# Patient Record
Sex: FEMALE | Race: WHITE | NOT HISPANIC OR LATINO | Employment: OTHER | URBAN - METROPOLITAN AREA
[De-identification: names, ages, dates, MRNs, and addresses within clinical notes are randomized per-mention and may not be internally consistent; named-entity substitution may affect disease eponyms.]

---

## 2017-02-20 RX ORDER — MONTELUKAST SODIUM 10 MG/1
TABLET ORAL
Qty: 90 TABLET | Refills: 0 | Status: SHIPPED | OUTPATIENT
Start: 2017-02-20 | End: 2017-05-20 | Stop reason: SDUPTHER

## 2017-02-20 RX ORDER — SIMVASTATIN 20 MG
TABLET ORAL
Qty: 90 TABLET | Refills: 0 | Status: SHIPPED | OUTPATIENT
Start: 2017-02-20 | End: 2017-05-20 | Stop reason: SDUPTHER

## 2017-03-03 DIAGNOSIS — M81.0 OP (OSTEOPOROSIS): ICD-10-CM

## 2017-03-03 DIAGNOSIS — E03.9 ADULT HYPOTHYROIDISM: ICD-10-CM

## 2017-03-03 DIAGNOSIS — E78.5 HYPERLIPIDEMIA, UNSPECIFIED HYPERLIPIDEMIA TYPE: Primary | ICD-10-CM

## 2017-03-03 LAB
25(OH)D3+25(OH)D2 SERPL-MCNC: 49.5 NG/ML (ref 30–100)
ALBUMIN SERPL-MCNC: 4.6 G/DL (ref 3.5–5.2)
ALBUMIN/GLOB SERPL: 1.8 G/DL
ALP SERPL-CCNC: 53 U/L (ref 39–117)
ALT SERPL-CCNC: 20 U/L (ref 1–33)
AST SERPL-CCNC: 25 U/L (ref 1–32)
BASOPHILS # BLD AUTO: 0.02 10*3/MM3 (ref 0–0.2)
BASOPHILS NFR BLD AUTO: 0.4 % (ref 0–1.5)
BILIRUB SERPL-MCNC: 0.4 MG/DL (ref 0.1–1.2)
BUN SERPL-MCNC: 11 MG/DL (ref 8–23)
BUN/CREAT SERPL: 14.7 (ref 7–25)
CALCIUM SERPL-MCNC: 9.4 MG/DL (ref 8.6–10.5)
CHLORIDE SERPL-SCNC: 102 MMOL/L (ref 98–107)
CHOLEST SERPL-MCNC: 127 MG/DL (ref 0–200)
CO2 SERPL-SCNC: 29.4 MMOL/L (ref 22–29)
CREAT SERPL-MCNC: 0.75 MG/DL (ref 0.57–1)
EOSINOPHIL # BLD AUTO: 0.07 10*3/MM3 (ref 0–0.7)
EOSINOPHIL NFR BLD AUTO: 1.3 % (ref 0.3–6.2)
ERYTHROCYTE [DISTWIDTH] IN BLOOD BY AUTOMATED COUNT: 13.9 % (ref 11.7–13)
GLOBULIN SER CALC-MCNC: 2.5 GM/DL
GLUCOSE SERPL-MCNC: 90 MG/DL (ref 65–99)
HCT VFR BLD AUTO: 42.2 % (ref 35.6–45.5)
HDLC SERPL-MCNC: 58 MG/DL (ref 40–60)
HGB BLD-MCNC: 13.5 G/DL (ref 11.9–15.5)
IMM GRANULOCYTES # BLD: 0.02 10*3/MM3 (ref 0–0.03)
IMM GRANULOCYTES NFR BLD: 0.4 % (ref 0–0.5)
LDLC SERPL CALC-MCNC: 61 MG/DL (ref 0–100)
LYMPHOCYTES # BLD AUTO: 0.98 10*3/MM3 (ref 0.9–4.8)
LYMPHOCYTES NFR BLD AUTO: 17.7 % (ref 19.6–45.3)
MCH RBC QN AUTO: 29.7 PG (ref 26.9–32)
MCHC RBC AUTO-ENTMCNC: 32 G/DL (ref 32.4–36.3)
MCV RBC AUTO: 93 FL (ref 80.5–98.2)
MONOCYTES # BLD AUTO: 0.88 10*3/MM3 (ref 0.2–1.2)
MONOCYTES NFR BLD AUTO: 15.9 % (ref 5–12)
NEUTROPHILS # BLD AUTO: 3.58 10*3/MM3 (ref 1.9–8.1)
NEUTROPHILS NFR BLD AUTO: 64.3 % (ref 42.7–76)
PLATELET # BLD AUTO: 155 10*3/MM3 (ref 140–500)
POTASSIUM SERPL-SCNC: 4.1 MMOL/L (ref 3.5–5.2)
PROT SERPL-MCNC: 7.1 G/DL (ref 6–8.5)
RBC # BLD AUTO: 4.54 10*6/MM3 (ref 3.9–5.2)
SODIUM SERPL-SCNC: 144 MMOL/L (ref 136–145)
TRIGL SERPL-MCNC: 41 MG/DL (ref 0–150)
TSH SERPL DL<=0.005 MIU/L-ACNC: 0.3 MIU/ML (ref 0.27–4.2)
VLDLC SERPL CALC-MCNC: 8.2 MG/DL (ref 5–40)
WBC # BLD AUTO: 5.55 10*3/MM3 (ref 4.5–10.7)

## 2017-03-14 ENCOUNTER — OFFICE VISIT (OUTPATIENT)
Dept: INTERNAL MEDICINE | Facility: CLINIC | Age: 72
End: 2017-03-14

## 2017-03-14 VITALS
OXYGEN SATURATION: 97 % | DIASTOLIC BLOOD PRESSURE: 88 MMHG | HEART RATE: 68 BPM | WEIGHT: 121 LBS | SYSTOLIC BLOOD PRESSURE: 122 MMHG | BODY MASS INDEX: 21.44 KG/M2 | HEIGHT: 63 IN

## 2017-03-14 DIAGNOSIS — E03.9 ADULT HYPOTHYROIDISM: ICD-10-CM

## 2017-03-14 DIAGNOSIS — H40.9 GLAUCOMA OF BOTH EYES, UNSPECIFIED GLAUCOMA: ICD-10-CM

## 2017-03-14 DIAGNOSIS — Z11.59 NEED FOR HEPATITIS C SCREENING TEST: ICD-10-CM

## 2017-03-14 DIAGNOSIS — E78.5 HYPERLIPIDEMIA, UNSPECIFIED HYPERLIPIDEMIA TYPE: ICD-10-CM

## 2017-03-14 DIAGNOSIS — M81.0 OP (OSTEOPOROSIS): ICD-10-CM

## 2017-03-14 DIAGNOSIS — Z00.00 WELL ADULT EXAM: ICD-10-CM

## 2017-03-14 DIAGNOSIS — Z00.00 MEDICARE ANNUAL WELLNESS VISIT, SUBSEQUENT: Primary | ICD-10-CM

## 2017-03-14 PROCEDURE — 93000 ELECTROCARDIOGRAM COMPLETE: CPT | Performed by: INTERNAL MEDICINE

## 2017-03-14 PROCEDURE — G0009 ADMIN PNEUMOCOCCAL VACCINE: HCPCS | Performed by: INTERNAL MEDICINE

## 2017-03-14 PROCEDURE — 90732 PPSV23 VACC 2 YRS+ SUBQ/IM: CPT | Performed by: INTERNAL MEDICINE

## 2017-03-14 PROCEDURE — G0439 PPPS, SUBSEQ VISIT: HCPCS | Performed by: INTERNAL MEDICINE

## 2017-03-14 PROCEDURE — 99397 PER PM REEVAL EST PAT 65+ YR: CPT | Performed by: INTERNAL MEDICINE

## 2017-03-14 RX ORDER — LATANOPROST 50 UG/ML
1 SOLUTION/ DROPS OPHTHALMIC
COMMUNITY
Start: 2017-02-18 | End: 2020-01-14

## 2017-03-14 NOTE — PATIENT INSTRUCTIONS
Medicare Wellness  Personal Prevention Plan of Service     Date of Office Visit:  2017  Encounter Provider:  Chanel Duncan MD  Place of Service:  [facility]  Patient Name: Jasmine Banegas  :  1945    As part of the Medicare Wellness portion of your visit today, we are providing you with this personalized preventive plan of services (PPPS). This plan is based upon recommendations of the [place] Preventive Services Task Force (USPSTF) and the Advisory Committee on Immunization Practices (ACIP).    This lists the preventive care services that should be considered, and provides dates of when you are due. Items listed as completed are up-to-date and do not require any further intervention.    Health Maintenance   Topic Date Due   • HEPATITIS C SCREENING  2016   • MEDICARE ANNUAL WELLNESS  2016   • ZOSTER VACCINE  2016   • INFLUENZA VACCINE  2016   • PNEUMOCOCCAL VACCINES (65+ LOW/MEDIUM RISK) (2 of 2 - PPSV23) 2017   • DXA SCAN  2018   • LIPID PANEL  2018   • MAMMOGRAM  2018   • COLONOSCOPY  10/31/2018   • TDAP/TD VACCINES (2 - Td) 10/01/2022

## 2017-03-15 LAB — HCV AB S/CO SERPL IA: <0.1 S/CO RATIO (ref 0–0.9)

## 2017-03-21 PROBLEM — M81.0 SENILE OSTEOPOROSIS: Status: ACTIVE | Noted: 2017-03-21

## 2017-03-22 ENCOUNTER — HOSPITAL ENCOUNTER (OUTPATIENT)
Dept: INFUSION THERAPY | Facility: HOSPITAL | Age: 72
Discharge: HOME | End: 2017-03-22

## 2017-03-24 RX ORDER — LEVOTHYROXINE SODIUM 0.07 MG/1
TABLET ORAL
Qty: 90 TABLET | Refills: 0 | Status: SHIPPED | OUTPATIENT
Start: 2017-03-24 | End: 2017-06-22 | Stop reason: SDUPTHER

## 2017-03-28 DIAGNOSIS — M81.0 OSTEOPOROSIS: Primary | ICD-10-CM

## 2017-03-30 ENCOUNTER — TRANSCRIBE ORDERS (OUTPATIENT)
Dept: INFUSION THERAPY | Facility: HOSPITAL | Age: 72
End: 2017-03-30

## 2017-03-30 ENCOUNTER — HOSPITAL ENCOUNTER (OUTPATIENT)
Dept: INFUSION THERAPY | Facility: HOSPITAL | Age: 72
Discharge: HOME | End: 2017-03-30
Admitting: INTERNAL MEDICINE

## 2017-03-30 VITALS
DIASTOLIC BLOOD PRESSURE: 71 MMHG | SYSTOLIC BLOOD PRESSURE: 130 MMHG | RESPIRATION RATE: 20 BRPM | TEMPERATURE: 98.1 F | OXYGEN SATURATION: 95 % | HEART RATE: 75 BPM

## 2017-03-30 DIAGNOSIS — M81.0 OSTEOPOROSIS: Primary | ICD-10-CM

## 2017-03-30 DIAGNOSIS — N18.4 CKD (CHRONIC KIDNEY DISEASE), STAGE IV (HCC): ICD-10-CM

## 2017-03-30 DIAGNOSIS — N18.4 ANEMIA ASSOCIATED WITH CHRONIC RENAL FAILURE, STAGE 4 (SEVERE): ICD-10-CM

## 2017-03-30 DIAGNOSIS — D63.1 ANEMIA ASSOCIATED WITH CHRONIC RENAL FAILURE, STAGE 4 (SEVERE): ICD-10-CM

## 2017-03-30 DIAGNOSIS — M81.0 SENILE OSTEOPOROSIS: ICD-10-CM

## 2017-03-30 PROCEDURE — 96401 CHEMO ANTI-NEOPL SQ/IM: CPT

## 2017-03-30 PROCEDURE — 25010000002 DENOSUMAB 60 MG/ML SOLUTION: Performed by: INTERNAL MEDICINE

## 2017-03-30 RX ADMIN — DENOSUMAB 60 MG: 60 INJECTION SUBCUTANEOUS at 15:32

## 2017-04-10 DIAGNOSIS — H40.1111 PRIMARY OPEN ANGLE GLAUCOMA OF RIGHT EYE, MILD STAGE: Primary | ICD-10-CM

## 2017-05-22 RX ORDER — SIMVASTATIN 20 MG
TABLET ORAL
Qty: 90 TABLET | Refills: 0 | Status: SHIPPED | OUTPATIENT
Start: 2017-05-22 | End: 2017-08-17 | Stop reason: SDUPTHER

## 2017-05-22 RX ORDER — MONTELUKAST SODIUM 10 MG/1
TABLET ORAL
Qty: 90 TABLET | Refills: 0 | Status: SHIPPED | OUTPATIENT
Start: 2017-05-22 | End: 2017-08-17 | Stop reason: SDUPTHER

## 2017-06-22 RX ORDER — LEVOTHYROXINE SODIUM 0.07 MG/1
TABLET ORAL
Qty: 90 TABLET | Refills: 0 | Status: SHIPPED | OUTPATIENT
Start: 2017-06-22 | End: 2017-09-25 | Stop reason: SDUPTHER

## 2017-08-17 RX ORDER — MONTELUKAST SODIUM 10 MG/1
TABLET ORAL
Qty: 90 TABLET | Refills: 0 | Status: SHIPPED | OUTPATIENT
Start: 2017-08-17 | End: 2017-11-18 | Stop reason: SDUPTHER

## 2017-08-17 RX ORDER — SIMVASTATIN 20 MG
TABLET ORAL
Qty: 90 TABLET | Refills: 0 | Status: SHIPPED | OUTPATIENT
Start: 2017-08-17 | End: 2017-11-18 | Stop reason: SDUPTHER

## 2017-09-15 ENCOUNTER — LAB (OUTPATIENT)
Dept: INTERNAL MEDICINE | Facility: CLINIC | Age: 72
End: 2017-09-15

## 2017-09-15 DIAGNOSIS — E78.5 HYPERLIPIDEMIA, UNSPECIFIED HYPERLIPIDEMIA TYPE: Primary | ICD-10-CM

## 2017-09-15 DIAGNOSIS — Z00.00 HEALTHCARE MAINTENANCE: ICD-10-CM

## 2017-09-15 DIAGNOSIS — E03.9 ADULT HYPOTHYROIDISM: ICD-10-CM

## 2017-09-16 LAB
ALBUMIN SERPL-MCNC: 4.4 G/DL (ref 3.5–5.2)
ALBUMIN/GLOB SERPL: 1.6 G/DL
ALP SERPL-CCNC: 45 U/L (ref 39–117)
ALT SERPL-CCNC: 18 U/L (ref 1–33)
APPEARANCE UR: CLEAR
AST SERPL-CCNC: 20 U/L (ref 1–32)
BACTERIA #/AREA URNS HPF: NORMAL /HPF
BASOPHILS # BLD AUTO: 0.04 10*3/MM3 (ref 0–0.2)
BASOPHILS NFR BLD AUTO: 0.7 % (ref 0–1.5)
BILIRUB SERPL-MCNC: 0.7 MG/DL (ref 0.1–1.2)
BILIRUB UR QL STRIP: NEGATIVE
BUN SERPL-MCNC: 14 MG/DL (ref 8–23)
BUN/CREAT SERPL: 19.4 (ref 7–25)
CALCIUM SERPL-MCNC: 9.7 MG/DL (ref 8.6–10.5)
CHLORIDE SERPL-SCNC: 100 MMOL/L (ref 98–107)
CHOLEST SERPL-MCNC: 135 MG/DL (ref 0–200)
CO2 SERPL-SCNC: 28.5 MMOL/L (ref 22–29)
COLOR UR: YELLOW
CREAT SERPL-MCNC: 0.72 MG/DL (ref 0.57–1)
EOSINOPHIL # BLD AUTO: 0.11 10*3/MM3 (ref 0–0.7)
EOSINOPHIL NFR BLD AUTO: 2 % (ref 0.3–6.2)
EPI CELLS #/AREA URNS HPF: NORMAL /HPF
ERYTHROCYTE [DISTWIDTH] IN BLOOD BY AUTOMATED COUNT: 14.3 % (ref 11.7–13)
GLOBULIN SER CALC-MCNC: 2.7 GM/DL
GLUCOSE SERPL-MCNC: 82 MG/DL (ref 65–99)
GLUCOSE UR QL: NEGATIVE
HCT VFR BLD AUTO: 41.1 % (ref 35.6–45.5)
HDLC SERPL-MCNC: 56 MG/DL (ref 40–60)
HGB BLD-MCNC: 13.5 G/DL (ref 11.9–15.5)
HGB UR QL STRIP: NEGATIVE
IMM GRANULOCYTES # BLD: 0.02 10*3/MM3 (ref 0–0.03)
IMM GRANULOCYTES NFR BLD: 0.4 % (ref 0–0.5)
KETONES UR QL STRIP: NEGATIVE
LDLC SERPL CALC-MCNC: 69 MG/DL (ref 0–100)
LEUKOCYTE ESTERASE UR QL STRIP: NEGATIVE
LYMPHOCYTES # BLD AUTO: 1.5 10*3/MM3 (ref 0.9–4.8)
LYMPHOCYTES NFR BLD AUTO: 27.3 % (ref 19.6–45.3)
MCH RBC QN AUTO: 29.9 PG (ref 26.9–32)
MCHC RBC AUTO-ENTMCNC: 32.8 G/DL (ref 32.4–36.3)
MCV RBC AUTO: 90.9 FL (ref 80.5–98.2)
MICRO URNS: NORMAL
MICRO URNS: NORMAL
MONOCYTES # BLD AUTO: 0.45 10*3/MM3 (ref 0.2–1.2)
MONOCYTES NFR BLD AUTO: 8.2 % (ref 5–12)
NEUTROPHILS # BLD AUTO: 3.37 10*3/MM3 (ref 1.9–8.1)
NEUTROPHILS NFR BLD AUTO: 61.4 % (ref 42.7–76)
NITRITE UR QL STRIP: NEGATIVE
PH UR STRIP: 7 [PH] (ref 5–7.5)
PLATELET # BLD AUTO: 165 10*3/MM3 (ref 140–500)
POTASSIUM SERPL-SCNC: 4.2 MMOL/L (ref 3.5–5.2)
PROT SERPL-MCNC: 7.1 G/DL (ref 6–8.5)
PROT UR QL STRIP: NEGATIVE
RBC # BLD AUTO: 4.52 10*6/MM3 (ref 3.9–5.2)
RBC #/AREA URNS HPF: NORMAL /HPF
SODIUM SERPL-SCNC: 142 MMOL/L (ref 136–145)
SP GR UR: 1.01 (ref 1–1.03)
T4 FREE SERPL-MCNC: 1.82 NG/DL (ref 0.93–1.7)
TRIGL SERPL-MCNC: 50 MG/DL (ref 0–150)
TSH SERPL DL<=0.005 MIU/L-ACNC: 0.15 MIU/ML (ref 0.27–4.2)
URINALYSIS REFLEX: NORMAL
UROBILINOGEN UR STRIP-MCNC: 0.2 MG/DL (ref 0.2–1)
VLDLC SERPL CALC-MCNC: 10 MG/DL (ref 5–40)
WBC # BLD AUTO: 5.49 10*3/MM3 (ref 4.5–10.7)
WBC #/AREA URNS HPF: NORMAL /HPF

## 2017-09-25 RX ORDER — LEVOTHYROXINE SODIUM 0.07 MG/1
TABLET ORAL
Qty: 90 TABLET | Refills: 0 | Status: SHIPPED | OUTPATIENT
Start: 2017-09-25 | End: 2017-12-27 | Stop reason: SDUPTHER

## 2017-09-28 ENCOUNTER — HOSPITAL ENCOUNTER (OUTPATIENT)
Dept: INFUSION THERAPY | Facility: HOSPITAL | Age: 72
Discharge: HOME | End: 2017-09-28
Admitting: INTERNAL MEDICINE

## 2017-09-28 VITALS
TEMPERATURE: 97.8 F | OXYGEN SATURATION: 98 % | DIASTOLIC BLOOD PRESSURE: 68 MMHG | BODY MASS INDEX: 21.09 KG/M2 | HEART RATE: 51 BPM | RESPIRATION RATE: 20 BRPM | WEIGHT: 119 LBS | SYSTOLIC BLOOD PRESSURE: 133 MMHG | HEIGHT: 63 IN

## 2017-09-28 DIAGNOSIS — M81.0 SENILE OSTEOPOROSIS: ICD-10-CM

## 2017-09-28 PROCEDURE — 25010000002 DENOSUMAB 60 MG/ML SOLUTION: Performed by: INTERNAL MEDICINE

## 2017-09-28 PROCEDURE — 96372 THER/PROPH/DIAG INJ SC/IM: CPT

## 2017-09-28 RX ADMIN — DENOSUMAB 60 MG: 60 INJECTION SUBCUTANEOUS at 15:05

## 2017-11-20 RX ORDER — SIMVASTATIN 20 MG
TABLET ORAL
Qty: 90 TABLET | Refills: 0 | Status: SHIPPED | OUTPATIENT
Start: 2017-11-20 | End: 2018-02-22 | Stop reason: SDUPTHER

## 2017-11-20 RX ORDER — MONTELUKAST SODIUM 10 MG/1
TABLET ORAL
Qty: 90 TABLET | Refills: 0 | Status: SHIPPED | OUTPATIENT
Start: 2017-11-20 | End: 2018-02-22 | Stop reason: SDUPTHER

## 2017-12-27 RX ORDER — LEVOTHYROXINE SODIUM 0.07 MG/1
TABLET ORAL
Qty: 90 TABLET | Refills: 0 | Status: SHIPPED | OUTPATIENT
Start: 2017-12-27 | End: 2018-03-19 | Stop reason: SDUPTHER

## 2018-02-22 RX ORDER — MONTELUKAST SODIUM 10 MG/1
TABLET ORAL
Qty: 90 TABLET | Refills: 0 | Status: SHIPPED | OUTPATIENT
Start: 2018-02-22 | End: 2018-04-02 | Stop reason: SDUPTHER

## 2018-02-22 RX ORDER — SIMVASTATIN 20 MG
TABLET ORAL
Qty: 90 TABLET | Refills: 0 | Status: SHIPPED | OUTPATIENT
Start: 2018-02-22 | End: 2018-04-02 | Stop reason: SDUPTHER

## 2018-03-12 DIAGNOSIS — E78.5 HYPERLIPIDEMIA, UNSPECIFIED HYPERLIPIDEMIA TYPE: ICD-10-CM

## 2018-03-12 DIAGNOSIS — E03.9 ADULT HYPOTHYROIDISM: ICD-10-CM

## 2018-03-12 DIAGNOSIS — M81.0 OSTEOPOROSIS, UNSPECIFIED OSTEOPOROSIS TYPE, UNSPECIFIED PATHOLOGICAL FRACTURE PRESENCE: ICD-10-CM

## 2018-03-12 LAB
25(OH)D3+25(OH)D2 SERPL-MCNC: 82.5 NG/ML (ref 30–100)
ALBUMIN SERPL-MCNC: 4.4 G/DL (ref 3.5–5.2)
ALBUMIN/GLOB SERPL: 1.8 G/DL
ALP SERPL-CCNC: 43 U/L (ref 39–117)
ALT SERPL-CCNC: 19 U/L (ref 1–33)
APPEARANCE UR: CLEAR
AST SERPL-CCNC: 22 U/L (ref 1–32)
BACTERIA #/AREA URNS HPF: NORMAL /HPF
BASOPHILS # BLD AUTO: 0.03 10*3/MM3 (ref 0–0.2)
BASOPHILS NFR BLD AUTO: 0.5 % (ref 0–1.5)
BILIRUB SERPL-MCNC: 0.5 MG/DL (ref 0.1–1.2)
BILIRUB UR QL STRIP: NEGATIVE
BUN SERPL-MCNC: 10 MG/DL (ref 8–23)
BUN/CREAT SERPL: 13.9 (ref 7–25)
CALCIUM SERPL-MCNC: 9.4 MG/DL (ref 8.6–10.5)
CASTS URNS MICRO: NORMAL
CHLORIDE SERPL-SCNC: 100 MMOL/L (ref 98–107)
CHOLEST SERPL-MCNC: 142 MG/DL (ref 0–200)
CO2 SERPL-SCNC: 30.3 MMOL/L (ref 22–29)
COLOR UR: YELLOW
CREAT SERPL-MCNC: 0.72 MG/DL (ref 0.57–1)
EOSINOPHIL # BLD AUTO: 0.09 10*3/MM3 (ref 0–0.7)
EOSINOPHIL NFR BLD AUTO: 1.5 % (ref 0.3–6.2)
EPI CELLS #/AREA URNS HPF: NORMAL /HPF
ERYTHROCYTE [DISTWIDTH] IN BLOOD BY AUTOMATED COUNT: 14 % (ref 11.7–13)
GFR SERPLBLD CREATININE-BSD FMLA CKD-EPI: 80 ML/MIN/1.73
GFR SERPLBLD CREATININE-BSD FMLA CKD-EPI: 97 ML/MIN/1.73
GLOBULIN SER CALC-MCNC: 2.4 GM/DL
GLUCOSE SERPL-MCNC: 94 MG/DL (ref 65–99)
GLUCOSE UR QL: NEGATIVE
HCT VFR BLD AUTO: 40.6 % (ref 35.6–45.5)
HDLC SERPL-MCNC: 58 MG/DL (ref 40–60)
HGB BLD-MCNC: 12.9 G/DL (ref 11.9–15.5)
HGB UR QL STRIP: NEGATIVE
IMM GRANULOCYTES # BLD: 0 10*3/MM3 (ref 0–0.03)
IMM GRANULOCYTES NFR BLD: 0 % (ref 0–0.5)
KETONES UR QL STRIP: NEGATIVE
LDLC SERPL CALC-MCNC: 76 MG/DL (ref 0–100)
LEUKOCYTE ESTERASE UR QL STRIP: (no result)
LYMPHOCYTES # BLD AUTO: 1.87 10*3/MM3 (ref 0.9–4.8)
LYMPHOCYTES NFR BLD AUTO: 30.2 % (ref 19.6–45.3)
MCH RBC QN AUTO: 29.5 PG (ref 26.9–32)
MCHC RBC AUTO-ENTMCNC: 31.8 G/DL (ref 32.4–36.3)
MCV RBC AUTO: 92.7 FL (ref 80.5–98.2)
MONOCYTES # BLD AUTO: 0.49 10*3/MM3 (ref 0.2–1.2)
MONOCYTES NFR BLD AUTO: 7.9 % (ref 5–12)
NEUTROPHILS # BLD AUTO: 3.71 10*3/MM3 (ref 1.9–8.1)
NEUTROPHILS NFR BLD AUTO: 59.9 % (ref 42.7–76)
NITRITE UR QL STRIP: NEGATIVE
PH UR STRIP: 7.5 [PH] (ref 5–8)
PLATELET # BLD AUTO: 165 10*3/MM3 (ref 140–500)
POTASSIUM SERPL-SCNC: 4.4 MMOL/L (ref 3.5–5.2)
PROT SERPL-MCNC: 6.8 G/DL (ref 6–8.5)
PROT UR QL STRIP: NEGATIVE
RBC # BLD AUTO: 4.38 10*6/MM3 (ref 3.9–5.2)
RBC #/AREA URNS HPF: NORMAL /HPF
SODIUM SERPL-SCNC: 141 MMOL/L (ref 136–145)
SP GR UR: 1.01 (ref 1–1.03)
TRIGL SERPL-MCNC: 42 MG/DL (ref 0–150)
TSH SERPL DL<=0.005 MIU/L-ACNC: 0.58 MIU/ML (ref 0.27–4.2)
UROBILINOGEN UR STRIP-MCNC: (no result) MG/DL
VLDLC SERPL CALC-MCNC: 8.4 MG/DL (ref 5–40)
WBC # BLD AUTO: 6.19 10*3/MM3 (ref 4.5–10.7)
WBC #/AREA URNS HPF: NORMAL /HPF

## 2018-03-19 ENCOUNTER — OFFICE VISIT (OUTPATIENT)
Dept: INTERNAL MEDICINE | Facility: CLINIC | Age: 73
End: 2018-03-19

## 2018-03-19 VITALS
BODY MASS INDEX: 21.09 KG/M2 | WEIGHT: 119 LBS | OXYGEN SATURATION: 99 % | DIASTOLIC BLOOD PRESSURE: 92 MMHG | HEIGHT: 63 IN | HEART RATE: 69 BPM | SYSTOLIC BLOOD PRESSURE: 142 MMHG

## 2018-03-19 DIAGNOSIS — Z12.4 ENCOUNTER FOR SCREENING FOR CERVICAL CANCER: ICD-10-CM

## 2018-03-19 DIAGNOSIS — Z12.31 ENCOUNTER FOR SCREENING MAMMOGRAM FOR BREAST CANCER: ICD-10-CM

## 2018-03-19 DIAGNOSIS — E03.9 ADULT HYPOTHYROIDISM: ICD-10-CM

## 2018-03-19 DIAGNOSIS — E78.5 HYPERLIPIDEMIA, UNSPECIFIED HYPERLIPIDEMIA TYPE: ICD-10-CM

## 2018-03-19 DIAGNOSIS — Z00.00 MEDICARE ANNUAL WELLNESS VISIT, SUBSEQUENT: Primary | ICD-10-CM

## 2018-03-19 DIAGNOSIS — H40.9 GLAUCOMA OF BOTH EYES, UNSPECIFIED GLAUCOMA TYPE: ICD-10-CM

## 2018-03-19 DIAGNOSIS — M81.0 SENILE OSTEOPOROSIS: ICD-10-CM

## 2018-03-19 DIAGNOSIS — Z00.00 WELL ADULT EXAM: ICD-10-CM

## 2018-03-19 PROBLEM — M17.9 OSTEOARTHRITIS OF KNEE: Status: ACTIVE | Noted: 2018-03-19

## 2018-03-19 PROCEDURE — 96160 PT-FOCUSED HLTH RISK ASSMT: CPT | Performed by: INTERNAL MEDICINE

## 2018-03-19 PROCEDURE — 99397 PER PM REEVAL EST PAT 65+ YR: CPT | Performed by: INTERNAL MEDICINE

## 2018-03-19 PROCEDURE — G0439 PPPS, SUBSEQ VISIT: HCPCS | Performed by: INTERNAL MEDICINE

## 2018-03-19 RX ORDER — LISINOPRIL 10 MG/1
10 TABLET ORAL DAILY
Qty: 30 TABLET | Refills: 11 | Status: SHIPPED | OUTPATIENT
Start: 2018-03-19 | End: 2018-04-02 | Stop reason: SDUPTHER

## 2018-03-19 RX ORDER — BRIMONIDINE TARTRATE 2 MG/ML
1 SOLUTION/ DROPS OPHTHALMIC 2 TIMES DAILY
COMMUNITY
Start: 2017-12-27 | End: 2021-03-10

## 2018-03-19 NOTE — PROGRESS NOTES
Subjective     Jasmine Banegas is a 72 y.o. female who presents for an annual wellness visit/cpe as well as check up of hypothyroidism, hyperlipdemia.  New HTN.      History of Present Illness     Hypothyroidism.  Good control.  HLD.  Good control with simvastatin.  HTN.  BP is running in the 150s-160s at home.   OP.  She is maintained on Prolia, calcium and D.  She is due for DEXA.      Review of Systems   Constitutional: Negative.    HENT: Negative.    Eyes: Negative.    Respiratory: Negative.    Cardiovascular: Negative.    Gastrointestinal: Negative.    Endocrine: Negative.    Genitourinary: Negative.    Musculoskeletal: Negative.    Skin: Negative.    Allergic/Immunologic: Negative.    Neurological: Negative.    Hematological: Negative.    Psychiatric/Behavioral: Negative.        The following portions of the patient's history were reviewed and updated as appropriate: allergies, current medications, past family history, past medical history, past social history, past surgical history and problem list.  Health maintenance tab was reviewed and updated with the patient.       Patient Active Problem List    Diagnosis Date Noted   • Osteoarthritis of knee 03/19/2018   • Senile osteoporosis 03/21/2017   • Glaucoma of both eyes 03/14/2017   • Overactive bladder 03/07/2016   • HLD (hyperlipidemia) 02/25/2016     Note Last Updated: 2/25/2016     Description: Statins started in 2/2014 secondary to 7.6% 10 year ASCVD risk.     • Adult hypothyroidism 02/25/2016   • OP (osteoporosis) 02/25/2016     Note Last Updated: 2/25/2016     Description: Fosamax 3170-5101  Prolia started in 2014. Recommend 1200mg calcium and 1000IUs D in diet/supplements.     • Essential hypertension 02/25/2016     Note Last Updated: 2/25/2016     Impression: 02/12/2014 - Blood pressure is well-controlled when not in the office.  I do encourage to check regularly.;          Past Medical History:   Diagnosis Date   • Graves disease    • Graves disease  "   • Graves disease    • Hyperlipidemia    • Hypertension    • Lower back pain    • Osteoporosis    • Overactive bladder    • RA (rheumatoid arthritis)        Past Surgical History:   Procedure Laterality Date   • THYROID SURGERY         Family History   Problem Relation Age of Onset   • Hypertension Mother    • Heart disease Father    • Alcohol abuse Brother        Social History     Social History   • Marital status: Single     Spouse name: N/A   • Number of children: N/A   • Years of education: N/A     Occupational History   • Not on file.     Social History Main Topics   • Smoking status: Never Smoker   • Smokeless tobacco: Never Used   • Alcohol use Yes      Comment: rare   • Drug use: Unknown   • Sexual activity: Not on file     Other Topics Concern   • Not on file     Social History Narrative   • No narrative on file       Current Outpatient Prescriptions on File Prior to Visit   Medication Sig Dispense Refill   • Calcium-Magnesium-Vitamin D ER (CALCIUM 1200+D3) 600- MG-MG-UNIT tablet sustained-release 24 hour Take  by mouth.     • Coenzyme Q10 (COQ-10) 200 MG capsule Take  by mouth.     • denosumab (PROLIA) 60 MG/ML solution syringe Inject  under the skin.     • latanoprost (XALATAN) 0.005 % ophthalmic solution      • levothyroxine (SYNTHROID, LEVOTHROID) 75 MCG tablet TAKE ONE TABLET BY MOUTH ONCE DAILY 90 tablet 0   • montelukast (SINGULAIR) 10 MG tablet TAKE ONE TABLET BY MOUTH ONCE DAILY 90 tablet 0   • simvastatin (ZOCOR) 20 MG tablet TAKE ONE TABLET BY MOUTH AT BEDTIME 90 tablet 0     No current facility-administered medications on file prior to visit.        Allergies   Allergen Reactions   • Sulfa Antibiotics        Immunization History   Administered Date(s) Administered   • Pneumococcal Conjugate 13-Valent (PCV13) 03/19/2015, 03/07/2016   • Pneumococcal Polysaccharide (PPSV23) 03/14/2017   • Tdap 10/01/2012       Objective     /92   Pulse 69   Ht 160 cm (62.99\")   Wt 54 kg (119 lb)  "  SpO2 99%   BMI 21.09 kg/m²     Physical Exam   Constitutional: She is oriented to person, place, and time. She appears well-developed and well-nourished.   HENT:   Head: Normocephalic and atraumatic.   Right Ear: Hearing, tympanic membrane and external ear normal.   Left Ear: Hearing, tympanic membrane and external ear normal.   Nose: Nose normal.   Mouth/Throat: Oropharynx is clear and moist.   Neck: Neck supple. No thyromegaly present.   Cardiovascular: Normal rate, regular rhythm and normal heart sounds.    No murmur heard.  Pulmonary/Chest: Effort normal and breath sounds normal. Right breast exhibits no mass. Left breast exhibits no mass.   Abdominal: Soft. She exhibits no distension. There is no hepatosplenomegaly. There is no tenderness.   Genitourinary: Vagina normal and uterus normal. No breast tenderness. There is no lesion on the right labia. There is no lesion on the left labia. Cervix exhibits no motion tenderness, no discharge and no friability. Right adnexum displays no mass and no tenderness. Left adnexum displays no mass and no tenderness.   Lymphadenopathy:     She has no cervical adenopathy.   Neurological: She is alert and oriented to person, place, and time.   Skin: Skin is warm and dry.   Psychiatric: She has a normal mood and affect. Her speech is normal and behavior is normal. Judgment and thought content normal. Cognition and memory are normal.       Assessment/Plan   Jasmine was seen today for medicare wellness visit.    Diagnoses and all orders for this visit:    Medicare annual wellness visit, subsequent    Encounter for screening mammogram for breast cancer  -     Mammo Screening Bilateral With CAD; Future    Well adult exam    Hyperlipidemia, unspecified hyperlipidemia type    Adult hypothyroidism    Senile osteoporosis    Glaucoma of both eyes, unspecified glaucoma type    Other orders  -     lisinopril (PRINIVIL,ZESTRIL) 10 MG tablet; Take 1 tablet by mouth  Daily.        Discussion    AWV/CPE.  See scanned forms for rosa history, PHQ-9, functional ability questionnaire, cognitive impairment screening.  Direct observation of cognitive abilities:  The patient does not exhibit  any impairment in cognitive abilities upon direct observation at today's visit.   These were all reviewed with the patient and the patient was provided with a personal prevention plan of service in patient instructions.  Patient was given advice or information on the following topics:  fall prevention   Hypothyroidism.  Continue current.    HLD.  Continue simvastatin.    HTN.  Start lisinopril.  Discussed with the patient onset on action and the potential side effects including cough.  The patient will let me know of any side effects from the medication.      Osteoporosis.  I recommend to get 1200 mg of calcium and 1000 IUs of vitamin D through diet and supplements and to participate in a weight based exercise to prevent loss of bone mineral density. Bone mineral will be monitored every two years.  Continue Prolia.       Health Maintenance   Topic Date Due   • ZOSTER VACCINE  02/25/2016   • DXA SCAN  02/28/2018   • PAP SMEAR  03/07/2018   • MEDICARE ANNUAL WELLNESS  03/14/2018   • MAMMOGRAM  09/22/2018   • COLONOSCOPY  12/31/2018   • LIPID PANEL  03/12/2019   • TDAP/TD VACCINES (2 - Td) 10/01/2022   • HEPATITIS C SCREENING  Completed   • INFLUENZA VACCINE  Completed   • PNEUMOCOCCAL VACCINES (65+ LOW/MEDIUM RISK)  Completed            Future Appointments  Date Time Provider Department Center   3/29/2018 3:00 PM ROOM 8 [facility]  [facility]

## 2018-03-19 NOTE — PATIENT INSTRUCTIONS
Medicare Wellness  Personal Prevention Plan of Service     Date of Office Visit:  2018  Encounter Provider:  Chanel Duncan MD  Place of Service:  [facility] INTER[facility] MEDIC[facility]  Patient Name: Jasmine Banegas  :  1945    As part of the Medicare Wellness portion of your visit today, we are providing you with this personalized preventive plan of services (PPPS). This plan is based upon recommendations of the [place] Preventive Services Task Force (USPSTF) and the Advisory Committee on Immunization Practices (ACIP).    This lists the preventive care services that should be considered, and provides dates of when you are due. Items listed as completed are up-to-date and do not require any further intervention.    Health Maintenance   Topic Date Due   • ZOSTER VACCINE  2016   • DXA SCAN  2018   • PAP SMEAR  2018   • MEDICARE ANNUAL WELLNESS  2018   • MAMMOGRAM  2018   • COLONOSCOPY  2018   • LIPID PANEL  2019   • TDAP/TD VACCINES (2 - Td) 10/01/2022   • HEPATITIS C SCREENING  Completed   • INFLUENZA VACCINE  Completed   • PNEUMOCOCCAL VACCINES (65+ LOW/MEDIUM RISK)  Completed       Orders Placed This Encounter   Procedures   • Mammo Screening Bilateral With CAD     Standing Status:   Future     Standing Expiration Date:   3/20/2019     Order Specific Question:   Reason for Exam:     Answer:   screening       Return in about 2 weeks (around 2018).

## 2018-03-20 RX ORDER — LEVOTHYROXINE SODIUM 0.07 MG/1
TABLET ORAL
Qty: 90 TABLET | Refills: 0 | Status: SHIPPED | OUTPATIENT
Start: 2018-03-20 | End: 2018-04-02 | Stop reason: SDUPTHER

## 2018-03-21 LAB
CONV .: NORMAL
CYTOLOGIST CVX/VAG CYTO: NORMAL
CYTOLOGY CVX/VAG DOC THIN PREP: NORMAL
DX ICD CODE: NORMAL
HIV 1 & 2 AB SER-IMP: NORMAL
OTHER STN SPEC: NORMAL
PATH REPORT.FINAL DX SPEC: NORMAL
STAT OF ADQ CVX/VAG CYTO-IMP: NORMAL

## 2018-03-29 ENCOUNTER — HOSPITAL ENCOUNTER (OUTPATIENT)
Dept: INFUSION THERAPY | Facility: HOSPITAL | Age: 73
Discharge: HOME | End: 2018-03-29

## 2018-04-02 ENCOUNTER — OFFICE VISIT (OUTPATIENT)
Dept: INTERNAL MEDICINE | Facility: CLINIC | Age: 73
End: 2018-04-02

## 2018-04-02 ENCOUNTER — HOSPITAL ENCOUNTER (OUTPATIENT)
Dept: INFUSION THERAPY | Facility: HOSPITAL | Age: 73
Discharge: HOME | End: 2018-04-02
Admitting: INTERNAL MEDICINE

## 2018-04-02 VITALS
OXYGEN SATURATION: 98 % | TEMPERATURE: 96.3 F | HEART RATE: 58 BPM | RESPIRATION RATE: 16 BRPM | SYSTOLIC BLOOD PRESSURE: 130 MMHG | DIASTOLIC BLOOD PRESSURE: 67 MMHG

## 2018-04-02 VITALS
SYSTOLIC BLOOD PRESSURE: 124 MMHG | WEIGHT: 121 LBS | DIASTOLIC BLOOD PRESSURE: 72 MMHG | HEART RATE: 60 BPM | BODY MASS INDEX: 21.44 KG/M2

## 2018-04-02 DIAGNOSIS — M81.0 OSTEOPOROSIS, UNSPECIFIED OSTEOPOROSIS TYPE, UNSPECIFIED PATHOLOGICAL FRACTURE PRESENCE: Primary | ICD-10-CM

## 2018-04-02 DIAGNOSIS — M81.0 OSTEOPOROSIS, UNSPECIFIED OSTEOPOROSIS TYPE, UNSPECIFIED PATHOLOGICAL FRACTURE PRESENCE: ICD-10-CM

## 2018-04-02 DIAGNOSIS — I10 ESSENTIAL HYPERTENSION: ICD-10-CM

## 2018-04-02 LAB
BUN SERPL-MCNC: 11 MG/DL (ref 8–23)
BUN/CREAT SERPL: 16.7 (ref 7–25)
CALCIUM SERPL-MCNC: 9.1 MG/DL (ref 8.6–10.5)
CHLORIDE SERPL-SCNC: 101 MMOL/L (ref 98–107)
CO2 SERPL-SCNC: 27.2 MMOL/L (ref 22–29)
CREAT SERPL-MCNC: 0.66 MG/DL (ref 0.57–1)
GFR SERPLBLD CREATININE-BSD FMLA CKD-EPI: 107 ML/MIN/1.73
GFR SERPLBLD CREATININE-BSD FMLA CKD-EPI: 88 ML/MIN/1.73
GLUCOSE SERPL-MCNC: 87 MG/DL (ref 65–99)
POTASSIUM SERPL-SCNC: 4.3 MMOL/L (ref 3.5–5.2)
SODIUM SERPL-SCNC: 140 MMOL/L (ref 136–145)

## 2018-04-02 PROCEDURE — 77080 DXA BONE DENSITY AXIAL: CPT | Performed by: INTERNAL MEDICINE

## 2018-04-02 PROCEDURE — 96372 THER/PROPH/DIAG INJ SC/IM: CPT

## 2018-04-02 PROCEDURE — 25010000002 DENOSUMAB 60 MG/ML SOLUTION: Performed by: INTERNAL MEDICINE

## 2018-04-02 PROCEDURE — 99213 OFFICE O/P EST LOW 20 MIN: CPT | Performed by: INTERNAL MEDICINE

## 2018-04-02 RX ORDER — SIMVASTATIN 20 MG
20 TABLET ORAL
Qty: 90 TABLET | Refills: 3 | Status: SHIPPED | OUTPATIENT
Start: 2018-04-02 | End: 2019-03-25 | Stop reason: SDUPTHER

## 2018-04-02 RX ORDER — LISINOPRIL 10 MG/1
10 TABLET ORAL DAILY
Qty: 90 TABLET | Refills: 3 | Status: SHIPPED | OUTPATIENT
Start: 2018-04-02 | End: 2019-03-25 | Stop reason: SDUPTHER

## 2018-04-02 RX ORDER — LEVOTHYROXINE SODIUM 0.07 MG/1
75 TABLET ORAL DAILY
Qty: 90 TABLET | Refills: 3 | Status: SHIPPED | OUTPATIENT
Start: 2018-04-02 | End: 2019-03-25 | Stop reason: SDUPTHER

## 2018-04-02 RX ORDER — MONTELUKAST SODIUM 10 MG/1
10 TABLET ORAL DAILY
Qty: 90 TABLET | Refills: 3 | Status: SHIPPED | OUTPATIENT
Start: 2018-04-02 | End: 2019-03-25 | Stop reason: SDUPTHER

## 2018-04-02 RX ADMIN — DENOSUMAB 60 MG: 60 INJECTION SUBCUTANEOUS at 09:36

## 2018-04-02 NOTE — PROGRESS NOTES
Soraya Banegas is a 72 y.o. female who presents with   Chief Complaint   Patient presents with   • Osteoporosis   • Hypertension       History of Present Illness     HTN.  Running well on lisinopril.    OP.  Revewed BMD with the patient.  See separate report.      Review of Systems   Respiratory: Negative.    Cardiovascular: Negative.        The following portions of the patient's history were reviewed and updated as appropriate: allergies, current medications and problem list.    Patient Active Problem List    Diagnosis Date Noted   • Osteoarthritis of knee 03/19/2018   • Senile osteoporosis 03/21/2017   • Glaucoma of both eyes 03/14/2017   • Overactive bladder 03/07/2016   • HLD (hyperlipidemia) 02/25/2016     Note Last Updated: 2/25/2016     Description: Statins started in 2/2014 secondary to 7.6% 10 year ASCVD risk.     • Adult hypothyroidism 02/25/2016   • OP (osteoporosis) 02/25/2016     Note Last Updated: 2/25/2016     Description: Fosamax 7723-0019  Prolia started in 2014. Recommend 1200mg calcium and 1000IUs D in diet/supplements.     • Essential hypertension 02/25/2016     Note Last Updated: 2/25/2016     Impression: 02/12/2014 - Blood pressure is well-controlled when not in the office.  I do encourage to check regularly.;          Current Outpatient Prescriptions on File Prior to Visit   Medication Sig Dispense Refill   • brimonidine (ALPHAGAN) 0.2 % ophthalmic solution      • Calcium-Magnesium-Vitamin D ER (CALCIUM 1200+D3) 600- MG-MG-UNIT tablet sustained-release 24 hour Take  by mouth.     • Coenzyme Q10 (COQ-10) 200 MG capsule Take  by mouth.     • denosumab (PROLIA) 60 MG/ML solution syringe Inject  under the skin.     • latanoprost (XALATAN) 0.005 % ophthalmic solution      • Multiple Vitamins-Minerals (MULTIVITAMIN ADULT PO) Take  by mouth.     • Omega-3 Fatty Acids (FISH OIL) 1200 MG capsule delayed-release Take  by mouth.     • [DISCONTINUED] levothyroxine (SYNTHROID,  LEVOTHROID) 75 MCG tablet TAKE ONE TABLET BY MOUTH ONCE DAILY 90 tablet 0   • [DISCONTINUED] lisinopril (PRINIVIL,ZESTRIL) 10 MG tablet Take 1 tablet by mouth Daily. 30 tablet 11   • [DISCONTINUED] montelukast (SINGULAIR) 10 MG tablet TAKE ONE TABLET BY MOUTH ONCE DAILY 90 tablet 0   • [DISCONTINUED] simvastatin (ZOCOR) 20 MG tablet TAKE ONE TABLET BY MOUTH AT BEDTIME 90 tablet 0     No current facility-administered medications on file prior to visit.        Objective     /72   Pulse 60   Wt 54.9 kg (121 lb)   BMI 21.44 kg/m²     Physical Exam   Constitutional: She is oriented to person, place, and time. She appears well-developed and well-nourished.   HENT:   Head: Normocephalic and atraumatic.   Cardiovascular: Normal rate, regular rhythm and normal heart sounds.    Pulmonary/Chest: Effort normal and breath sounds normal.   Neurological: She is alert and oriented to person, place, and time.   Skin: Skin is warm and dry.   Psychiatric: She has a normal mood and affect. Her behavior is normal.       Assessment/Plan   Jasmine was seen today for osteoporosis and hypertension.    Diagnoses and all orders for this visit:    Essential hypertension  -     Basic Metabolic Panel    Osteoporosis, unspecified osteoporosis type, unspecified pathological fracture presence  -     Ambulatory Referral to [facility] For Infusion Treatment    Other orders  -     lisinopril (PRINIVIL,ZESTRIL) 10 MG tablet; Take 1 tablet by mouth Daily.  -     simvastatin (ZOCOR) 20 MG tablet; Take 1 tablet by mouth every night at bedtime.  -     montelukast (SINGULAIR) 10 MG tablet; Take 1 tablet by mouth Daily.  -     levothyroxine (SYNTHROID, LEVOTHROID) 75 MCG tablet; Take 1 tablet by mouth Daily.        Discussion  HTN.  Continue lisinopril and check BMP.    OP.  I recommend to get 1200 mg of calcium and 1000 IUs of vitamin D through diet and supplements and to participate in a weight based exercise to prevent loss of bone mineral density. Bone  mineral will be monitored every two years.  Continue Prolia.           Future Appointments  Date Time Provider Department Center   4/2/2018 10:30 AM ROOM 7 [facility]   4/11/2018 12:30 PM [facility]  [facility]

## 2018-04-02 NOTE — PATIENT INSTRUCTIONS
Denosumab injection  What is this medicine?  DENOSUMAB (den oh raghav mab) slows bone breakdown. Prolia is used to treat osteoporosis in women after menopause and in men. Xgeva is used to treat a high calcium level due to cancer and to prevent bone fractures and other bone problems caused by multiple myeloma or cancer bone metastases. Xgeva is also used to treat giant cell tumor of the bone.  This medicine may be used for other purposes; ask your health care provider or pharmacist if you have questions.  COMMON BRAND NAME(S): Prolia, XGEVA  What should I tell my health care provider before I take this medicine?  They need to know if you have any of these conditions:  -dental disease  -having surgery or tooth extraction  -infection  -kidney disease  -low levels of calcium or Vitamin D in the blood  -malnutrition  -on hemodialysis  -skin conditions or sensitivity  -thyroid or parathyroid disease  -an unusual reaction to denosumab, other medicines, foods, dyes, or preservatives  -pregnant or trying to get pregnant  -breast-feeding  How should I use this medicine?  This medicine is for injection under the skin. It is given by a health care professional in a hospital or clinic setting.  If you are getting Prolia, a special MedGuide will be given to you by the pharmacist with each prescription and refill. Be sure to read this information carefully each time.  For Prolia, talk to your pediatrician regarding the use of this medicine in children. Special care may be needed. For Xgeva, talk to your pediatrician regarding the use of this medicine in children. While this drug may be prescribed for children as young as 13 years for selected conditions, precautions do apply.  Overdosage: If you think you have taken too much of this medicine contact a poison control center or emergency room at once.  NOTE: This medicine is only for you. Do not share this medicine with others.  What if I miss a dose?  It is important not to miss your  dose. Call your doctor or health care professional if you are unable to keep an appointment.  What may interact with this medicine?  Do not take this medicine with any of the following medications:  -other medicines containing denosumab  This medicine may also interact with the following medications:  -medicines that lower your chance of fighting infection  -steroid medicines like prednisone or cortisone  This list may not describe all possible interactions. Give your health care provider a list of all the medicines, herbs, non-prescription drugs, or dietary supplements you use. Also tell them if you smoke, drink alcohol, or use illegal drugs. Some items may interact with your medicine.  What should I watch for while using this medicine?  Visit your doctor or health care professional for regular checks on your progress. Your doctor or health care professional may order blood tests and other tests to see how you are doing.  Call your doctor or health care professional for advice if you get a fever, chills or sore throat, or other symptoms of a cold or flu. Do not treat yourself. This drug may decrease your body's ability to fight infection. Try to avoid being around people who are sick.  You should make sure you get enough calcium and vitamin D while you are taking this medicine, unless your doctor tells you not to. Discuss the foods you eat and the vitamins you take with your health care professional.  See your dentist regularly. Brush and floss your teeth as directed. Before you have any dental work done, tell your dentist you are receiving this medicine.  Do not become pregnant while taking this medicine or for 5 months after stopping it. Talk with your doctor or health care professional about your birth control options while taking this medicine. Women should inform their doctor if they wish to become pregnant or think they might be pregnant. There is a potential for serious side effects to an unborn child. Talk  to your health care professional or pharmacist for more information.  What side effects may I notice from receiving this medicine?  Side effects that you should report to your doctor or health care professional as soon as possible:  -allergic reactions like skin rash, itching or hives, swelling of the face, lips, or tongue  -bone pain  -breathing problems  -dizziness  -jaw pain, especially after dental work  -redness, blistering, peeling of the skin  -signs and symptoms of infection like fever or chills; cough; sore throat; pain or trouble passing urine  -signs of low calcium like fast heartbeat, muscle cramps or muscle pain; pain, tingling, numbness in the hands or feet; seizures  -unusual bleeding or bruising  -unusually weak or tired  Side effects that usually do not require medical attention (report to your doctor or health care professional if they continue or are bothersome):  -constipation  -diarrhea  -headache  -joint pain  -loss of appetite  -muscle pain  -runny nose  -tiredness  -upset stomach  This list may not describe all possible side effects. Call your doctor for medical advice about side effects. You may report side effects to FDA at 1-819-FDA-2289.  Where should I keep my medicine?  This medicine is only given in a clinic, doctor's office, or other health care setting and will not be stored at home.  NOTE: This sheet is a summary. It may not cover all possible information. If you have questions about this medicine, talk to your doctor, pharmacist, or health care provider.  © 2018 [employer] (2018-01-09 19:17:21)

## 2018-04-02 NOTE — PROGRESS NOTES
Patient tolerated injection without complaints. Band-aid to site. Patient ambulatory, D/C'd from [facility] at 0940.

## 2018-04-11 ENCOUNTER — HOSPITAL ENCOUNTER (OUTPATIENT)
Dept: MAMMOGRAPHY | Facility: HOSPITAL | Age: 73
Discharge: HOME | End: 2018-04-11
Admitting: INTERNAL MEDICINE

## 2018-04-11 DIAGNOSIS — Z12.31 ENCOUNTER FOR SCREENING MAMMOGRAM FOR BREAST CANCER: ICD-10-CM

## 2018-04-11 PROCEDURE — 77067 SCR MAMMO BI INCL CAD: CPT

## 2018-09-27 DIAGNOSIS — E03.9 HYPOTHYROIDISM, UNSPECIFIED TYPE: ICD-10-CM

## 2018-09-27 DIAGNOSIS — E78.5 HYPERLIPIDEMIA, UNSPECIFIED HYPERLIPIDEMIA TYPE: Primary | ICD-10-CM

## 2018-10-01 LAB
ALBUMIN SERPL-MCNC: 4.5 G/DL (ref 3.5–5.2)
ALBUMIN/GLOB SERPL: 2 G/DL
ALP SERPL-CCNC: 46 U/L (ref 39–117)
ALT SERPL-CCNC: 16 U/L (ref 1–33)
AST SERPL-CCNC: 20 U/L (ref 1–32)
BILIRUB SERPL-MCNC: 0.5 MG/DL (ref 0.1–1.2)
BUN SERPL-MCNC: 10 MG/DL (ref 8–23)
BUN/CREAT SERPL: 15.9 (ref 7–25)
CALCIUM SERPL-MCNC: 9.2 MG/DL (ref 8.6–10.5)
CHLORIDE SERPL-SCNC: 97 MMOL/L (ref 98–107)
CHOLEST SERPL-MCNC: 121 MG/DL (ref 0–200)
CO2 SERPL-SCNC: 28.2 MMOL/L (ref 22–29)
CREAT SERPL-MCNC: 0.63 MG/DL (ref 0.57–1)
GLOBULIN SER CALC-MCNC: 2.2 GM/DL
GLUCOSE SERPL-MCNC: 97 MG/DL (ref 65–99)
HDLC SERPL-MCNC: 65 MG/DL (ref 40–60)
LDLC SERPL CALC-MCNC: 48 MG/DL (ref 0–100)
POTASSIUM SERPL-SCNC: 4.6 MMOL/L (ref 3.5–5.2)
PROT SERPL-MCNC: 6.7 G/DL (ref 6–8.5)
SODIUM SERPL-SCNC: 136 MMOL/L (ref 136–145)
T4 FREE SERPL-MCNC: 1.99 NG/DL (ref 0.93–1.7)
TRIGL SERPL-MCNC: 38 MG/DL (ref 0–150)
TSH SERPL DL<=0.005 MIU/L-ACNC: 0.08 MIU/ML (ref 0.27–4.2)
VLDLC SERPL CALC-MCNC: 7.6 MG/DL (ref 5–40)

## 2018-10-05 ENCOUNTER — TELEPHONE (OUTPATIENT)
Dept: INTERNAL MEDICINE | Facility: CLINIC | Age: 73
End: 2018-10-05

## 2018-10-05 NOTE — TELEPHONE ENCOUNTER
Patient was informed she would need to go [place] for her prolia shot. She states this is out of her way. Is there some where else she can go? RJ    Advise patient I am looking into for the future but at this point this is the only option I have.  ALYCIA

## 2018-10-08 NOTE — TELEPHONE ENCOUNTER
I left message for patient that [facility] ([facility]) no longer offers Prolia.  The only locations at present are [place] or the [facility].

## 2018-10-09 ENCOUNTER — APPOINTMENT (OUTPATIENT)
Dept: INFUSION THERAPY | Facility: HOSPITAL | Age: 73
End: 2018-10-09

## 2018-10-09 ENCOUNTER — OFFICE VISIT (OUTPATIENT)
Dept: INTERNAL MEDICINE | Facility: CLINIC | Age: 73
End: 2018-10-09

## 2018-10-09 VITALS
HEIGHT: 63 IN | DIASTOLIC BLOOD PRESSURE: 78 MMHG | TEMPERATURE: 98.4 F | RESPIRATION RATE: 15 BRPM | WEIGHT: 116 LBS | OXYGEN SATURATION: 98 % | HEART RATE: 75 BPM | BODY MASS INDEX: 20.55 KG/M2 | SYSTOLIC BLOOD PRESSURE: 128 MMHG

## 2018-10-09 DIAGNOSIS — I10 ESSENTIAL HYPERTENSION: Primary | ICD-10-CM

## 2018-10-09 DIAGNOSIS — E78.5 HYPERLIPIDEMIA, UNSPECIFIED HYPERLIPIDEMIA TYPE: ICD-10-CM

## 2018-10-09 DIAGNOSIS — M81.0 OSTEOPOROSIS, UNSPECIFIED OSTEOPOROSIS TYPE, UNSPECIFIED PATHOLOGICAL FRACTURE PRESENCE: ICD-10-CM

## 2018-10-09 DIAGNOSIS — E03.9 ADULT HYPOTHYROIDISM: ICD-10-CM

## 2018-10-09 PROCEDURE — 90662 IIV NO PRSV INCREASED AG IM: CPT | Performed by: INTERNAL MEDICINE

## 2018-10-09 PROCEDURE — 99214 OFFICE O/P EST MOD 30 MIN: CPT | Performed by: INTERNAL MEDICINE

## 2018-10-09 PROCEDURE — G0008 ADMIN INFLUENZA VIRUS VAC: HCPCS | Performed by: INTERNAL MEDICINE

## 2018-10-09 RX ORDER — FLUTICASONE PROPIONATE 50 MCG
2 SPRAY, SUSPENSION (ML) NASAL DAILY
Qty: 1 BOTTLE | Refills: 11 | Status: SHIPPED | OUTPATIENT
Start: 2018-10-09 | End: 2019-03-25 | Stop reason: SDUPTHER

## 2018-10-09 NOTE — PROGRESS NOTES
Subjective     Jasmine Banegas is a 73 y.o. female who presents with   Chief Complaint   Patient presents with   • Hypothyroidism   • Hypertension   • Hyperlipidemia       History of Present Illness     Hypothyroidism.  She is slightly overcontrolled.  HLD.  Good control with simvastatin.  HTN.  Control is good.   OP.  She is maintained on Prolia, calcium and D.  She requests to not go to [place].     Review of Systems   Respiratory: Negative.    Cardiovascular: Negative.        The following portions of the patient's history were reviewed and updated as appropriate: allergies, current medications and problem list.    Patient Active Problem List    Diagnosis Date Noted   • Osteoarthritis of knee 03/19/2018   • Senile osteoporosis 03/21/2017   • Glaucoma of both eyes 03/14/2017   • Overactive bladder 03/07/2016   • HLD (hyperlipidemia) 02/25/2016     Note Last Updated: 2/25/2016     Description: Statins started in 2/2014 secondary to 7.6% 10 year ASCVD risk.     • Adult hypothyroidism 02/25/2016   • OP (osteoporosis) 02/25/2016     Note Last Updated: 2/25/2016     Description: Fosamax 4571-0619  Prolia started in 2014. Recommend 1200mg calcium and 1000IUs D in diet/supplements.     • Essential hypertension 02/25/2016     Note Last Updated: 2/25/2016     Impression: 02/12/2014 - Blood pressure is well-controlled when not in the office.  I do encourage to check regularly.;          Current Outpatient Prescriptions on File Prior to Visit   Medication Sig Dispense Refill   • brimonidine (ALPHAGAN) 0.2 % ophthalmic solution      • Calcium-Magnesium-Vitamin D ER (CALCIUM 1200+D3) 600- MG-MG-UNIT tablet sustained-release 24 hour Take  by mouth.     • Coenzyme Q10 (COQ-10) 200 MG capsule Take  by mouth.     • denosumab (PROLIA) 60 MG/ML solution syringe Inject  under the skin.     • latanoprost (XALATAN) 0.005 % ophthalmic solution      • levothyroxine (SYNTHROID, LEVOTHROID) 75 MCG tablet Take 1 tablet by mouth  "Daily. 90 tablet 3   • lisinopril (PRINIVIL,ZESTRIL) 10 MG tablet Take 1 tablet by mouth Daily. 90 tablet 3   • montelukast (SINGULAIR) 10 MG tablet Take 1 tablet by mouth Daily. 90 tablet 3   • Multiple Vitamins-Minerals (MULTIVITAMIN ADULT PO) Take  by mouth.     • Omega-3 Fatty Acids (FISH OIL) 1200 MG capsule delayed-release Take  by mouth.     • simvastatin (ZOCOR) 20 MG tablet Take 1 tablet by mouth every night at bedtime. 90 tablet 3     No current facility-administered medications on file prior to visit.        Objective     /78 (BP Location: Left arm, Patient Position: Sitting, Cuff Size: Adult)   Pulse 75   Temp 98.4 °F (36.9 °C) (Oral)   Resp 15   Ht 160 cm (62.99\")   Wt 52.6 kg (116 lb)   SpO2 98%   BMI 20.55 kg/m²     Physical Exam   Constitutional: She is oriented to person, place, and time. She appears well-developed and well-nourished.   HENT:   Head: Normocephalic and atraumatic.   Cardiovascular: Normal rate, regular rhythm and normal heart sounds.    Pulmonary/Chest: Effort normal and breath sounds normal.   Neurological: She is alert and oriented to person, place, and time.   Skin: Skin is warm and dry.   Psychiatric: She has a normal mood and affect. Her behavior is normal.       Assessment/Plan   Jasmine was seen today for hypothyroidism, hypertension and hyperlipidemia.    Diagnoses and all orders for this visit:    Essential hypertension    Hyperlipidemia, unspecified hyperlipidemia type    Adult hypothyroidism    Osteoporosis, unspecified osteoporosis type, unspecified pathological fracture presence    Other orders  -     fluticasone (FLONASE) 50 MCG/ACT nasal spray; 2 sprays into the nostril(s) as directed by provider Daily.  -     Fluzone High Dose =>65Years        Discussion  Hypothyroidism.  Continue current except decrease by 1/2 one day a week.    HLD.  Continue simvastatin.    HTN. Continue current.      Osteoporosis.  Look into getting Prolia at [facility].     "     Future Appointments  Date Time Provider Department Center   12/4/2018 9:00 AM [facility] None   3/18/2019 8:50 AM [facility] None   3/25/2019 8:15 AM Chanel Duncan MD [facility] None

## 2019-01-28 DIAGNOSIS — E03.9 HYPOTHYROIDISM, UNSPECIFIED TYPE: Primary | ICD-10-CM

## 2019-01-28 LAB — TSH SERPL DL<=0.005 MIU/L-ACNC: 0.43 MIU/ML (ref 0.27–4.2)

## 2019-02-07 ENCOUNTER — INFUSION (OUTPATIENT)
Dept: ONCOLOGY | Facility: HOSPITAL | Age: 74
End: 2019-02-07

## 2019-02-07 ENCOUNTER — TELEPHONE (OUTPATIENT)
Dept: INTERNAL MEDICINE | Facility: CLINIC | Age: 74
End: 2019-02-07

## 2019-02-07 ENCOUNTER — APPOINTMENT (OUTPATIENT)
Dept: OTHER | Facility: HOSPITAL | Age: 74
End: 2019-02-07

## 2019-02-07 VITALS
SYSTOLIC BLOOD PRESSURE: 117 MMHG | DIASTOLIC BLOOD PRESSURE: 72 MMHG | HEART RATE: 60 BPM | TEMPERATURE: 98.3 F | RESPIRATION RATE: 14 BRPM | OXYGEN SATURATION: 97 % | BODY MASS INDEX: 20.2 KG/M2 | WEIGHT: 114 LBS

## 2019-02-07 DIAGNOSIS — M81.0 OSTEOPOROSIS, UNSPECIFIED OSTEOPOROSIS TYPE, UNSPECIFIED PATHOLOGICAL FRACTURE PRESENCE: Primary | ICD-10-CM

## 2019-02-07 LAB
ANION GAP SERPL CALCULATED.3IONS-SCNC: 10.1 MMOL/L
BUN BLD-MCNC: 13 MG/DL (ref 8–23)
BUN/CREAT SERPL: 18.6 (ref 7–25)
CALCIUM SPEC-SCNC: 9.3 MG/DL (ref 8.6–10.5)
CHLORIDE SERPL-SCNC: 104 MMOL/L (ref 98–107)
CO2 SERPL-SCNC: 27.9 MMOL/L (ref 22–29)
CREAT BLD-MCNC: 0.7 MG/DL (ref 0.57–1)
GFR SERPL CREATININE-BSD FRML MDRD: 82 ML/MIN/1.73
GLUCOSE BLD-MCNC: 95 MG/DL (ref 65–99)
POTASSIUM BLD-SCNC: 4.5 MMOL/L (ref 3.5–5.2)
SODIUM BLD-SCNC: 142 MMOL/L (ref 136–145)

## 2019-02-07 PROCEDURE — 96372 THER/PROPH/DIAG INJ SC/IM: CPT | Performed by: NURSE PRACTITIONER

## 2019-02-07 PROCEDURE — 25010000002 DENOSUMAB 60 MG/ML SOLUTION: Performed by: INTERNAL MEDICINE

## 2019-02-07 PROCEDURE — 80048 BASIC METABOLIC PNL TOTAL CA: CPT | Performed by: INTERNAL MEDICINE

## 2019-02-07 PROCEDURE — 36415 COLL VENOUS BLD VENIPUNCTURE: CPT | Performed by: INTERNAL MEDICINE

## 2019-02-07 RX ADMIN — DENOSUMAB 60 MG: 60 INJECTION SUBCUTANEOUS at 14:09

## 2019-03-15 DIAGNOSIS — E03.9 HYPOTHYROIDISM, UNSPECIFIED TYPE: ICD-10-CM

## 2019-03-15 DIAGNOSIS — E55.9 VITAMIN D DEFICIENCY: ICD-10-CM

## 2019-03-15 DIAGNOSIS — I10 HYPERTENSION, UNSPECIFIED TYPE: ICD-10-CM

## 2019-03-15 DIAGNOSIS — E78.5 HYPERLIPIDEMIA, UNSPECIFIED HYPERLIPIDEMIA TYPE: Primary | ICD-10-CM

## 2019-03-21 LAB
25(OH)D3+25(OH)D2 SERPL-MCNC: 90.6 NG/ML (ref 30–100)
ALBUMIN SERPL-MCNC: 4.3 G/DL (ref 3.5–5.2)
ALBUMIN/GLOB SERPL: 1.8 G/DL
ALP SERPL-CCNC: 48 U/L (ref 39–117)
ALT SERPL-CCNC: 15 U/L (ref 1–33)
APPEARANCE UR: CLEAR
AST SERPL-CCNC: 19 U/L (ref 1–32)
BACTERIA #/AREA URNS HPF: NORMAL /HPF
BACTERIA UR CULT: ABNORMAL
BACTERIA UR CULT: ABNORMAL
BASOPHILS # BLD AUTO: 0.06 10*3/MM3 (ref 0–0.2)
BASOPHILS NFR BLD AUTO: 1 % (ref 0–1.5)
BILIRUB SERPL-MCNC: 0.4 MG/DL (ref 0.2–1.2)
BILIRUB UR QL STRIP: NEGATIVE
BUN SERPL-MCNC: 12 MG/DL (ref 8–23)
BUN/CREAT SERPL: 16.2 (ref 7–25)
CALCIUM SERPL-MCNC: 9.2 MG/DL (ref 8.6–10.5)
CHLORIDE SERPL-SCNC: 98 MMOL/L (ref 98–107)
CHOLEST SERPL-MCNC: 132 MG/DL (ref 0–200)
CO2 SERPL-SCNC: 26.7 MMOL/L (ref 22–29)
COLOR UR: YELLOW
CREAT SERPL-MCNC: 0.74 MG/DL (ref 0.57–1)
EOSINOPHIL # BLD AUTO: 0.14 10*3/MM3 (ref 0–0.4)
EOSINOPHIL NFR BLD AUTO: 2.4 % (ref 0.3–6.2)
EPI CELLS #/AREA URNS HPF: NORMAL /HPF
ERYTHROCYTE [DISTWIDTH] IN BLOOD BY AUTOMATED COUNT: 13.6 % (ref 12.3–15.4)
GLOBULIN SER CALC-MCNC: 2.4 GM/DL
GLUCOSE SERPL-MCNC: 89 MG/DL (ref 65–99)
GLUCOSE UR QL: NEGATIVE
HCT VFR BLD AUTO: 41.4 % (ref 34–46.6)
HDLC SERPL-MCNC: 58 MG/DL (ref 40–60)
HGB BLD-MCNC: 13.3 G/DL (ref 12–15.9)
HGB UR QL STRIP: NEGATIVE
IMM GRANULOCYTES # BLD AUTO: 0.02 10*3/MM3 (ref 0–0.05)
IMM GRANULOCYTES NFR BLD AUTO: 0.3 % (ref 0–0.5)
KETONES UR QL STRIP: NEGATIVE
LDLC SERPL CALC-MCNC: 68 MG/DL (ref 0–100)
LEUKOCYTE ESTERASE UR QL STRIP: ABNORMAL
LYMPHOCYTES # BLD AUTO: 1.52 10*3/MM3 (ref 0.7–3.1)
LYMPHOCYTES NFR BLD AUTO: 25.9 % (ref 19.6–45.3)
MCH RBC QN AUTO: 30.5 PG (ref 26.6–33)
MCHC RBC AUTO-ENTMCNC: 32.1 G/DL (ref 31.5–35.7)
MCV RBC AUTO: 95 FL (ref 79–97)
MICRO URNS: ABNORMAL
MONOCYTES # BLD AUTO: 0.45 10*3/MM3 (ref 0.1–0.9)
MONOCYTES NFR BLD AUTO: 7.7 % (ref 5–12)
NEUTROPHILS # BLD AUTO: 3.67 10*3/MM3 (ref 1.4–7)
NEUTROPHILS NFR BLD AUTO: 62.7 % (ref 42.7–76)
NITRITE UR QL STRIP: NEGATIVE
NRBC BLD AUTO-RTO: 0 /100 WBC (ref 0–0)
OTHER ANTIBIOTIC SUSC ISLT: ABNORMAL
PH UR STRIP: 7 [PH] (ref 5–7.5)
PLATELET # BLD AUTO: 184 10*3/MM3 (ref 140–450)
POTASSIUM SERPL-SCNC: 4.6 MMOL/L (ref 3.5–5.2)
PROT SERPL-MCNC: 6.7 G/DL (ref 6–8.5)
PROT UR QL STRIP: NEGATIVE
RBC # BLD AUTO: 4.36 10*6/MM3 (ref 3.77–5.28)
RBC #/AREA URNS HPF: NORMAL /HPF
SODIUM SERPL-SCNC: 138 MMOL/L (ref 136–145)
SP GR UR: 1.01 (ref 1–1.03)
TRIGL SERPL-MCNC: 32 MG/DL (ref 0–150)
TSH SERPL DL<=0.005 MIU/L-ACNC: 0.78 MIU/ML (ref 0.27–4.2)
URINALYSIS REFLEX: ABNORMAL
UROBILINOGEN UR STRIP-MCNC: 0.2 MG/DL (ref 0.2–1)
VLDLC SERPL CALC-MCNC: 6.4 MG/DL (ref 5–40)
WBC # BLD AUTO: 5.86 10*3/MM3 (ref 3.4–10.8)
WBC #/AREA URNS HPF: NORMAL /HPF

## 2019-03-25 ENCOUNTER — OFFICE VISIT (OUTPATIENT)
Dept: INTERNAL MEDICINE | Facility: CLINIC | Age: 74
End: 2019-03-25

## 2019-03-25 VITALS
HEART RATE: 57 BPM | TEMPERATURE: 98 F | OXYGEN SATURATION: 98 % | BODY MASS INDEX: 19.84 KG/M2 | HEIGHT: 63 IN | RESPIRATION RATE: 18 BRPM | WEIGHT: 112 LBS | SYSTOLIC BLOOD PRESSURE: 109 MMHG | DIASTOLIC BLOOD PRESSURE: 60 MMHG

## 2019-03-25 DIAGNOSIS — Z00.00 MEDICARE ANNUAL WELLNESS VISIT, SUBSEQUENT: Primary | ICD-10-CM

## 2019-03-25 DIAGNOSIS — M81.0 OSTEOPOROSIS, UNSPECIFIED OSTEOPOROSIS TYPE, UNSPECIFIED PATHOLOGICAL FRACTURE PRESENCE: ICD-10-CM

## 2019-03-25 DIAGNOSIS — I10 ESSENTIAL HYPERTENSION: ICD-10-CM

## 2019-03-25 DIAGNOSIS — Z12.31 ENCOUNTER FOR SCREENING MAMMOGRAM FOR BREAST CANCER: ICD-10-CM

## 2019-03-25 DIAGNOSIS — Z12.11 COLON CANCER SCREENING: ICD-10-CM

## 2019-03-25 DIAGNOSIS — E03.9 ADULT HYPOTHYROIDISM: ICD-10-CM

## 2019-03-25 DIAGNOSIS — Z00.00 WELL ADULT EXAM: ICD-10-CM

## 2019-03-25 DIAGNOSIS — E78.5 HYPERLIPIDEMIA, UNSPECIFIED HYPERLIPIDEMIA TYPE: ICD-10-CM

## 2019-03-25 PROCEDURE — 99397 PER PM REEVAL EST PAT 65+ YR: CPT | Performed by: INTERNAL MEDICINE

## 2019-03-25 PROCEDURE — 96160 PT-FOCUSED HLTH RISK ASSMT: CPT | Performed by: INTERNAL MEDICINE

## 2019-03-25 PROCEDURE — G0439 PPPS, SUBSEQ VISIT: HCPCS | Performed by: INTERNAL MEDICINE

## 2019-03-25 RX ORDER — MONTELUKAST SODIUM 10 MG/1
10 TABLET ORAL DAILY
Qty: 90 TABLET | Refills: 3 | Status: SHIPPED | OUTPATIENT
Start: 2019-03-25 | End: 2020-04-27

## 2019-03-25 RX ORDER — SIMVASTATIN 20 MG
20 TABLET ORAL
Qty: 90 TABLET | Refills: 3 | Status: SHIPPED | OUTPATIENT
Start: 2019-03-25 | End: 2020-04-27

## 2019-03-25 RX ORDER — LISINOPRIL 10 MG/1
10 TABLET ORAL DAILY
Qty: 90 TABLET | Refills: 3 | Status: SHIPPED | OUTPATIENT
Start: 2019-03-25 | End: 2020-04-27

## 2019-03-25 RX ORDER — LEVOTHYROXINE SODIUM 0.07 MG/1
75 TABLET ORAL DAILY
Qty: 90 TABLET | Refills: 3 | Status: SHIPPED | OUTPATIENT
Start: 2019-03-25 | End: 2020-04-27

## 2019-03-25 RX ORDER — NITROFURANTOIN 25; 75 MG/1; MG/1
100 CAPSULE ORAL 2 TIMES DAILY
Qty: 14 CAPSULE | Refills: 0 | Status: SHIPPED | OUTPATIENT
Start: 2019-03-25 | End: 2019-04-01

## 2019-03-25 RX ORDER — FLUTICASONE PROPIONATE 50 MCG
2 SPRAY, SUSPENSION (ML) NASAL DAILY
Qty: 1 BOTTLE | Refills: 11 | Status: SHIPPED | OUTPATIENT
Start: 2019-03-25 | End: 2020-04-27

## 2019-03-25 NOTE — PROGRESS NOTES
Subjective     Jasmine Banegas is a 73 y.o. female who presents for an annual wellness visit as well as check up of htn, hld,hypothryoidism, op.      History of Present Illness     Hypothyroidism.  Good control.  HLD.  Good control with simvastatin.  HTN.  Control is good.  OP.  She is maintained on Prolia, calcium and D.      Review of Systems   Respiratory: Negative.    Cardiovascular: Negative.        The following portions of the patient's history were reviewed and updated as appropriate: allergies, current medications, past family history, past medical history, past social history, past surgical history and problem list.  Health maintenance tab was reviewed and updated with the patient.       Patient Active Problem List    Diagnosis Date Noted   • Osteoarthritis of knee 03/19/2018   • Glaucoma of both eyes 03/14/2017   • Overactive bladder 03/07/2016   • HLD (hyperlipidemia) 02/25/2016     Note Last Updated: 2/25/2016     Description: Statins started in 2/2014 secondary to 7.6% 10 year ASCVD risk.     • Adult hypothyroidism 02/25/2016   • OP (osteoporosis) 02/25/2016     Note Last Updated: 2/25/2016     Description: Fosamax 8815-1723  Prolia started in 2014. Recommend 1200mg calcium and 1000IUs D in diet/supplements.     • Essential hypertension 02/25/2016     Note Last Updated: 2/25/2016     Impression: 02/12/2014 - Blood pressure is well-controlled when not in the office.  I do encourage to check regularly.;          Past Medical History:   Diagnosis Date   • Brittle bones    • Graves disease    • Graves disease    • Graves disease    • Hyperlipidemia    • Hypertension    • Lower back pain    • Osteoporosis    • Overactive bladder    • RA (rheumatoid arthritis) (CMS/[facility])        Past Surgical History:   Procedure Laterality Date   • FEMUR FRACTURE SURGERY     • HIP FRACTURE SURGERY Bilateral     pin & plates in left hip; pin & plate removed right   • THYROID SURGERY         Family History   Problem Relation  Age of Onset   • Hypertension Mother    • Heart disease Father    • Alcohol abuse Brother        Social History     Socioeconomic History   • Marital status: Single     Spouse name: Not on file   • Number of children: Not on file   • Years of education: Not on file   • Highest education level: Not on file   Tobacco Use   • Smoking status: Never Smoker   • Smokeless tobacco: Never Used   Substance and Sexual Activity   • Alcohol use: Yes     Comment: rare   • Sexual activity: Defer       Current Outpatient Medications on File Prior to Visit   Medication Sig Dispense Refill   • brimonidine (ALPHAGAN) 0.2 % ophthalmic solution      • Calcium-Magnesium-Vitamin D ER (CALCIUM 1200+D3) 600- MG-MG-UNIT tablet sustained-release 24 hour Take  by mouth.     • Coenzyme Q10 (COQ-10) 200 MG capsule Take  by mouth.     • denosumab (PROLIA) 60 MG/ML solution syringe Inject  under the skin.     • latanoprost (XALATAN) 0.005 % ophthalmic solution      • Multiple Vitamins-Minerals (MULTIVITAMIN ADULT PO) Take  by mouth.     • Omega-3 Fatty Acids (FISH OIL) 1200 MG capsule delayed-release Take  by mouth.     • [DISCONTINUED] fluticasone (FLONASE) 50 MCG/ACT nasal spray 2 sprays into the nostril(s) as directed by provider Daily. 1 bottle 11   • [DISCONTINUED] levothyroxine (SYNTHROID, LEVOTHROID) 75 MCG tablet Take 1 tablet by mouth Daily. 90 tablet 3   • [DISCONTINUED] lisinopril (PRINIVIL,ZESTRIL) 10 MG tablet Take 1 tablet by mouth Daily. 90 tablet 3   • [DISCONTINUED] montelukast (SINGULAIR) 10 MG tablet Take 1 tablet by mouth Daily. 90 tablet 3   • [DISCONTINUED] simvastatin (ZOCOR) 20 MG tablet Take 1 tablet by mouth every night at bedtime. 90 tablet 3     No current facility-administered medications on file prior to visit.        Allergies   Allergen Reactions   • Sulfa Antibiotics        Immunization History   Administered Date(s) Administered   • Flu Vaccine High Dose PF 65YR+ 10/09/2018   • Pneumococcal Conjugate  "13-Valent (PCV13) 03/19/2015, 03/07/2016   • Pneumococcal Polysaccharide (PPSV23) 03/14/2017   • Tdap 10/01/2012       Objective     /60 (BP Location: Right arm, Patient Position: Sitting, Cuff Size: Adult)   Pulse 57   Temp 98 °F (36.7 °C) (Oral)   Resp 18   Ht 160 cm (63\")   Wt 50.8 kg (112 lb)   SpO2 98%   BMI 19.84 kg/m²     Physical Exam   Constitutional: She is oriented to person, place, and time. She appears well-developed and well-nourished.   HENT:   Head: Normocephalic and atraumatic.   Right Ear: Hearing, tympanic membrane and external ear normal.   Left Ear: Hearing, tympanic membrane and external ear normal.   Nose: Nose normal.   Mouth/Throat: Oropharynx is clear and moist.   Neck: Neck supple. No thyromegaly present.   Cardiovascular: Normal rate, regular rhythm and normal heart sounds.   No murmur heard.  Pulmonary/Chest: Effort normal and breath sounds normal. Right breast exhibits no mass. Left breast exhibits no mass.   Abdominal: Soft. She exhibits no distension. There is no hepatosplenomegaly. There is no tenderness.   Genitourinary: No breast tenderness.   Lymphadenopathy:     She has no cervical adenopathy.   Neurological: She is alert and oriented to person, place, and time.   Skin: Skin is warm and dry.   Psychiatric: She has a normal mood and affect. Her speech is normal and behavior is normal. Judgment and thought content normal. Cognition and memory are normal.       Assessment/Plan   Jasmine was seen today for annual exam.    Diagnoses and all orders for this visit:    Medicare annual wellness visit, subsequent    Well adult exam    Essential hypertension    Hyperlipidemia, unspecified hyperlipidemia type    Adult hypothyroidism    Osteoporosis, unspecified osteoporosis type, unspecified pathological fracture presence    Colon cancer screening  -     Ambulatory Referral For Screening Colonoscopy    Encounter for screening mammogram for breast cancer  -     Mammo Screening " Digital Tomosynthesis Bilateral With CAD; Future    Other orders  -     nitrofurantoin, macrocrystal-monohydrate, (MACROBID) 100 MG capsule; Take 1 capsule by mouth 2 (Two) Times a Day for 7 days.  -     montelukast (SINGULAIR) 10 MG tablet; Take 1 tablet by mouth Daily.  -     simvastatin (ZOCOR) 20 MG tablet; Take 1 tablet by mouth every night at bedtime.  -     lisinopril (PRINIVIL,ZESTRIL) 10 MG tablet; Take 1 tablet by mouth Daily.  -     fluticasone (FLONASE) 50 MCG/ACT nasal spray; 2 sprays into the nostril(s) as directed by provider Daily.  -     levothyroxine (SYNTHROID, LEVOTHROID) 75 MCG tablet; Take 1 tablet by mouth Daily.        Discussion    AWV.  See scanned forms and/or computer for rosa history, PHQ-9, functional ability questionnaire, cognitive impairment screening.  Direct observation of cognitive abilities:  The patient does not exhibit any impairment in cognitive abilities upon direct observation at today's visit.   These were all reviewed with the patient and the patient was provided with a personal prevention plan of service in patient instructions.  Patient was given advice or information on the following topics:  nutrition, exercise  Hypothyroidism.  Continue current.    HLD.  Continue simvastatin.    HTN.  Continue lisinopril.      Osteoporosis.  I recommend to get 1200 mg of calcium and 1000 IUs of vitamin D through diet and supplements and to participate in a weight based exercise to prevent loss of bone mineral density. Bone mineral will be monitored every two years.  Continue Prolia.   I have recommended that the patient get the following immunizations:  Shingrix and hepatitis A.        Health Maintenance   Topic Date Due   • ZOSTER VACCINE (1 of 2) 08/08/1996   • COLONOSCOPY  12/31/2018   • LIPID PANEL  03/18/2020   • PAP SMEAR  03/19/2020   • MEDICARE ANNUAL WELLNESS  03/25/2020   • DXA SCAN  04/02/2020   • MAMMOGRAM  04/11/2020   • TDAP/TD VACCINES (2 - Td) 10/01/2022   • HEPATITIS  C SCREENING  Completed   • INFLUENZA VACCINE  Completed   • PNEUMOCOCCAL VACCINES (65+ LOW/MEDIUM RISK)  Completed            Future Appointments   Date Time Provider Department Center   10/7/2019  8:10 AM [facility] None   10/14/2019  9:00 AM Chanel Duncan MD [facility] None

## 2019-03-25 NOTE — PATIENT INSTRUCTIONS
Medicare Wellness  Personal Prevention Plan of Service     Date of Office Visit:  2019  Encounter Provider:  Chanel Duncan MD  Place of Service:  [facility]  Patient Name: Jasmine Banegas  :  1945    As part of the Medicare Wellness portion of your visit today, we are providing you with this personalized preventive plan of services (PPPS). This plan is based upon recommendations of the [place] Preventive Services Task Force (USPSTF) and the Advisory Committee on Immunization Practices (ACIP).    This lists the preventive care services that should be considered, and provides dates of when you are due. Items listed as completed are up-to-date and do not require any further intervention.    Health Maintenance   Topic Date Due   • ZOSTER VACCINE (1 of 2) 1996   • COLONOSCOPY  2018   • MEDICARE ANNUAL WELLNESS  2019   • LIPID PANEL  2020   • PAP SMEAR  2020   • DXA SCAN  2020   • MAMMOGRAM  2020   • TDAP/TD VACCINES (2 - Td) 10/01/2022   • HEPATITIS C SCREENING  Completed   • INFLUENZA VACCINE  Completed   • PNEUMOCOCCAL VACCINES (65+ LOW/MEDIUM RISK)  Completed       No orders of the defined types were placed in this encounter.      No Follow-up on file.

## 2019-09-11 DIAGNOSIS — H40.9 GLAUCOMA, UNSPECIFIED GLAUCOMA TYPE, UNSPECIFIED LATERALITY: Primary | ICD-10-CM

## 2019-10-02 ENCOUNTER — HOSPITAL ENCOUNTER (OUTPATIENT)
Dept: MAMMOGRAPHY | Facility: HOSPITAL | Age: 74
Discharge: HOME | End: 2019-10-02
Admitting: INTERNAL MEDICINE

## 2019-10-02 DIAGNOSIS — Z12.31 ENCOUNTER FOR SCREENING MAMMOGRAM FOR BREAST CANCER: ICD-10-CM

## 2019-10-02 PROCEDURE — 77067 SCR MAMMO BI INCL CAD: CPT

## 2019-10-02 PROCEDURE — 77063 BREAST TOMOSYNTHESIS BI: CPT

## 2019-10-14 ENCOUNTER — OFFICE VISIT (OUTPATIENT)
Dept: INTERNAL MEDICINE | Facility: CLINIC | Age: 74
End: 2019-10-14

## 2019-10-14 VITALS
OXYGEN SATURATION: 98 % | WEIGHT: 108 LBS | BODY MASS INDEX: 19.14 KG/M2 | DIASTOLIC BLOOD PRESSURE: 76 MMHG | SYSTOLIC BLOOD PRESSURE: 120 MMHG | HEART RATE: 67 BPM | HEIGHT: 63 IN

## 2019-10-14 DIAGNOSIS — H40.9 GLAUCOMA OF BOTH EYES, UNSPECIFIED GLAUCOMA TYPE: ICD-10-CM

## 2019-10-14 DIAGNOSIS — E78.5 HYPERLIPIDEMIA, UNSPECIFIED HYPERLIPIDEMIA TYPE: ICD-10-CM

## 2019-10-14 DIAGNOSIS — I10 ESSENTIAL HYPERTENSION: Primary | ICD-10-CM

## 2019-10-14 DIAGNOSIS — Z23 ENCOUNTER FOR IMMUNIZATION: ICD-10-CM

## 2019-10-14 DIAGNOSIS — E03.9 ADULT HYPOTHYROIDISM: ICD-10-CM

## 2019-10-14 PROCEDURE — 90653 IIV ADJUVANT VACCINE IM: CPT | Performed by: INTERNAL MEDICINE

## 2019-10-14 PROCEDURE — G0008 ADMIN INFLUENZA VIRUS VAC: HCPCS | Performed by: INTERNAL MEDICINE

## 2019-10-14 PROCEDURE — 99214 OFFICE O/P EST MOD 30 MIN: CPT | Performed by: INTERNAL MEDICINE

## 2019-10-14 RX ORDER — DORZOLAMIDE HYDROCHLORIDE AND TIMOLOL MALEATE 20; 5 MG/ML; MG/ML
1 SOLUTION/ DROPS OPHTHALMIC 2 TIMES DAILY
Refills: 99 | COMMUNITY
Start: 2019-09-23 | End: 2021-03-10

## 2019-10-14 NOTE — PROGRESS NOTES
Subjective     Jasmine Banegas is a 74 y.o. female who presents with   Chief Complaint   Patient presents with   • Hypertension   • Hyperlipidemia   • Hypothyroidism       History of Present Illness     Hypothyroidism.  She is under excellent control.  HLD.  Good control with simvastatin.  HTN.  Control is good.     Trouble with eyes and her pressures.  She is seeing her doctor regularly.      Review of Systems   Respiratory: Negative.    Cardiovascular: Negative.        The following portions of the patient's history were reviewed and updated as appropriate: allergies, current medications and problem list.    Patient Active Problem List    Diagnosis Date Noted   • Osteoarthritis of knee 03/19/2018   • Glaucoma of both eyes 03/14/2017   • Overactive bladder 03/07/2016   • HLD (hyperlipidemia) 02/25/2016     Note Last Updated: 2/25/2016     Description: Statins started in 2/2014 secondary to 7.6% 10 year ASCVD risk.     • Adult hypothyroidism 02/25/2016   • OP (osteoporosis) 02/25/2016     Note Last Updated: 2/25/2016     Description: Fosamax [id number]  Prolia started in 2014. Recommend 1200mg calcium and 1000IUs D in diet/supplements.     • Essential hypertension 02/25/2016     Note Last Updated: 2/25/2016     Impression: 02/12/2014 - Blood pressure is well-controlled when not in the office.  I do encourage to check regularly.;          Current Outpatient Medications on File Prior to Visit   Medication Sig Dispense Refill   • brimonidine (ALPHAGAN) 0.2 % ophthalmic solution      • Calcium-Magnesium-Vitamin D ER (CALCIUM 1200+D3) 600- MG-MG-UNIT tablet sustained-release 24 hour Take  by mouth.     • Coenzyme Q10 (COQ-10) 200 MG capsule Take  by mouth.     • denosumab (PROLIA) 60 MG/ML solution syringe Inject  under the skin.     • dorzolamide-timolol (COSOPT) 22.3-6.8 MG/ML ophthalmic solution INSTILL 1 DROP INTO EACH EYE TWICE DAILY  99   • fluticasone (FLONASE) 50 MCG/ACT nasal spray 2 sprays into the  "nostril(s) as directed by provider Daily. 1 bottle 11   • latanoprost (XALATAN) 0.005 % ophthalmic solution      • levothyroxine (SYNTHROID, LEVOTHROID) 75 MCG tablet Take 1 tablet by mouth Daily. 90 tablet 3   • lisinopril (PRINIVIL,ZESTRIL) 10 MG tablet Take 1 tablet by mouth Daily. 90 tablet 3   • montelukast (SINGULAIR) 10 MG tablet Take 1 tablet by mouth Daily. 90 tablet 3   • Multiple Vitamins-Minerals (MULTIVITAMIN ADULT PO) Take  by mouth.     • Omega-3 Fatty Acids (FISH OIL) 1200 MG capsule delayed-release Take  by mouth.     • simvastatin (ZOCOR) 20 MG tablet Take 1 tablet by mouth every night at bedtime. 90 tablet 3     No current facility-administered medications on file prior to visit.        Objective     /76   Pulse 67   Ht 160 cm (62.99\")   Wt 49 kg (108 lb)   SpO2 98%   BMI 19.14 kg/m²     Physical Exam   Constitutional: She is oriented to person, place, and time. She appears well-developed and well-nourished.   HENT:   Head: Normocephalic and atraumatic.   Cardiovascular: Normal rate, regular rhythm and normal heart sounds.   Pulmonary/Chest: Effort normal and breath sounds normal.   Neurological: She is alert and oriented to person, place, and time.   Skin: Skin is warm and dry.   Psychiatric: She has a normal mood and affect. Her behavior is normal.       Assessment/Plan   Jasmine was seen today for hypertension, hyperlipidemia and hypothyroidism.    Diagnoses and all orders for this visit:    Essential hypertension    Hyperlipidemia, unspecified hyperlipidemia type    Adult hypothyroidism    Encounter for immunization    Glaucoma of both eyes, unspecified glaucoma type    Other orders  -     Fluzone High Dose =>65Years        Discussion    Hypothyroidism.  Continue current regimen.      HLD.  Continue simvastatin.    HTN. Continue current.  Glaucoma.  Continue with ophthalmology.    High dose flu today.         Future Appointments   Date Time Provider Department Center   4/15/2020  9:20 " AM [facility] None   4/22/2020  8:15 AM Chanel Duncan MD [facility] None

## 2019-11-07 ENCOUNTER — INFUSION (OUTPATIENT)
Dept: ONCOLOGY | Facility: HOSPITAL | Age: 74
End: 2019-11-07

## 2019-11-07 VITALS
DIASTOLIC BLOOD PRESSURE: 75 MMHG | HEART RATE: 61 BPM | SYSTOLIC BLOOD PRESSURE: 137 MMHG | TEMPERATURE: 98 F | RESPIRATION RATE: 16 BRPM | OXYGEN SATURATION: 97 % | WEIGHT: 106.2 LBS | BODY MASS INDEX: 18.82 KG/M2

## 2019-11-07 DIAGNOSIS — M81.0 OSTEOPOROSIS, UNSPECIFIED OSTEOPOROSIS TYPE, UNSPECIFIED PATHOLOGICAL FRACTURE PRESENCE: Primary | ICD-10-CM

## 2019-11-07 PROCEDURE — 25010000002 DENOSUMAB 60 MG/ML SOLUTION PREFILLED SYRINGE: Performed by: INTERNAL MEDICINE

## 2019-11-07 PROCEDURE — 96372 THER/PROPH/DIAG INJ SC/IM: CPT | Performed by: NURSE PRACTITIONER

## 2019-11-07 RX ADMIN — DENOSUMAB 60 MG: 60 INJECTION SUBCUTANEOUS at 13:54

## 2019-11-07 NOTE — PROGRESS NOTES
Arrived  for prolia injection. Indication and side effects reviewed. Denies recent dental work. Labs and medications verified. Prolia administered in left arm without incidence. Instructed to call prescribing MD for any concerns or questions and instructed on how to schedule future appts.  Pt vu and discharged ambulatory.

## 2020-01-09 ENCOUNTER — HOSPITAL ENCOUNTER (OUTPATIENT)
Facility: HOSPITAL | Age: 75
Setting detail: HOSPITAL OUTPATIENT SURGERY
End: 2020-01-09
Attending: OPHTHALMOLOGY | Admitting: OPHTHALMOLOGY

## 2020-01-14 ENCOUNTER — APPOINTMENT (OUTPATIENT)
Dept: PREADMISSION TESTING | Facility: HOSPITAL | Age: 75
End: 2020-01-14

## 2020-01-14 VITALS
RESPIRATION RATE: 16 BRPM | HEIGHT: 63 IN | DIASTOLIC BLOOD PRESSURE: 73 MMHG | BODY MASS INDEX: 18.61 KG/M2 | HEART RATE: 59 BPM | OXYGEN SATURATION: 98 % | SYSTOLIC BLOOD PRESSURE: 145 MMHG | TEMPERATURE: 97.5 F | WEIGHT: 105 LBS

## 2020-01-14 LAB
ANION GAP SERPL CALCULATED.3IONS-SCNC: 12.4 MMOL/L (ref 5–15)
BUN BLD-MCNC: 15 MG/DL (ref 8–23)
BUN/CREAT SERPL: 22.1 (ref 7–25)
CALCIUM SPEC-SCNC: 9.1 MG/DL (ref 8.6–10.5)
CHLORIDE SERPL-SCNC: 99 MMOL/L (ref 98–107)
CO2 SERPL-SCNC: 24.6 MMOL/L (ref 22–29)
CREAT BLD-MCNC: 0.68 MG/DL (ref 0.57–1)
DEPRECATED RDW RBC AUTO: 44.6 FL (ref 37–54)
ERYTHROCYTE [DISTWIDTH] IN BLOOD BY AUTOMATED COUNT: 13.3 % (ref 12.3–15.4)
GFR SERPL CREATININE-BSD FRML MDRD: 85 ML/MIN/1.73
GLUCOSE BLD-MCNC: 80 MG/DL (ref 65–99)
HCT VFR BLD AUTO: 39.2 % (ref 34–46.6)
HGB BLD-MCNC: 13.1 G/DL (ref 12–15.9)
MCH RBC QN AUTO: 30.3 PG (ref 26.6–33)
MCHC RBC AUTO-ENTMCNC: 33.4 G/DL (ref 31.5–35.7)
MCV RBC AUTO: 90.7 FL (ref 79–97)
PLATELET # BLD AUTO: 183 10*3/MM3 (ref 140–450)
PMV BLD AUTO: 10.8 FL (ref 6–12)
POTASSIUM BLD-SCNC: 3.9 MMOL/L (ref 3.5–5.2)
RBC # BLD AUTO: 4.32 10*6/MM3 (ref 3.77–5.28)
SODIUM BLD-SCNC: 136 MMOL/L (ref 136–145)
WBC NRBC COR # BLD: 6.89 10*3/MM3 (ref 3.4–10.8)

## 2020-01-14 PROCEDURE — 93005 ELECTROCARDIOGRAM TRACING: CPT

## 2020-01-14 PROCEDURE — 85027 COMPLETE CBC AUTOMATED: CPT | Performed by: OPHTHALMOLOGY

## 2020-01-14 PROCEDURE — 80048 BASIC METABOLIC PNL TOTAL CA: CPT | Performed by: OPHTHALMOLOGY

## 2020-01-14 PROCEDURE — 93010 ELECTROCARDIOGRAM REPORT: CPT | Performed by: INTERNAL MEDICINE

## 2020-01-14 PROCEDURE — 36415 COLL VENOUS BLD VENIPUNCTURE: CPT

## 2020-01-14 NOTE — DISCHARGE INSTRUCTIONS
Take the following medications the morning of surgery:    LEVOTHYROXINE    ARRIVE AT 5:45    General Instructions:  • Do not eat solid food after midnight the night before surgery.  • You may drink clear liquids day of surgery but must stop at least one hour before your hospital arrival time.  • It is beneficial for you to have a clear drink that contains carbohydrates the day of surgery.  We suggest a 12 to 20 ounce bottle of Gatorade or Powerade for non-diabetic patients or a 12 to 20 ounce bottle of G2 or Powerade Zero for diabetic patients. (Pediatric patients, are not advised to drink a 12 to 20 ounce carbohydrate drink)    Clear liquids are liquids you can see through.  Nothing red in color.     Plain water                               Sports drinks  Sodas                                   Gelatin (Jell-O)  Fruit juices without pulp such as white grape juice and apple juice  Popsicles that contain no fruit or yogurt  Tea or coffee (no cream or milk added)  Gatorade / Powerade  G2 / Powerade Zero    • Infants may have breast milk up to four hours before surgery.  • Infants drinking formula may drink formula up to six hours before surgery.   • Patients who avoid smoking, chewing tobacco and alcohol for 4 weeks prior to surgery have a reduced risk of post-operative complications.  Quit smoking as many days before surgery as you can.  • Do not smoke, use chewing tobacco or drink alcohol the day of surgery.   • If applicable bring your C-PAP/ BI-PAP machine.  • Bring any papers given to you in the doctor’s office.  • Wear clean comfortable clothes.  • Do not wear contact lenses, false eyelashes or make-up.  Bring a case for your glasses.   • Bring crutches or walker if applicable.  • Remove all piercings.  Leave jewelry and any other valuables at home.  • Hair extensions with metal clips must be removed prior to surgery.  • The Pre-Admission Testing nurse will instruct you to bring medications if unable to obtain  an accurate list in Pre-Admission Testing.        If you were given a blood bank ID arm band remember to bring it with you the day of surgery.    Preventing a Surgical Site Infection:  • For 2 to 3 days before surgery, avoid shaving with a razor because the razor can irritate skin and make it easier to develop an infection.    • Any areas of open skin can increase the risk of a post-operative wound infection by allowing bacteria to enter and travel throughout the body.  Notify your surgeon if you have any skin wounds / rashes even if it is not near the expected surgical site.  The area will need assessed to determine if surgery should be delayed until it is healed.  • The night prior to surgery sleep in a clean bed with clean clothing.  Do not allow pets to sleep with you.  • Shower on the morning of surgery using a fresh bar of anti-bacterial soap (such as Dial) and clean washcloth.  Dry with a clean towel and dress in clean clothing.  • Ask your surgeon if you will be receiving antibiotics prior to surgery.  • Make sure you, your family, and all healthcare providers clean their hands with soap and water or an alcohol based hand  before caring for you or your wound.    Day of surgery:  Your arrival time is approximately two hours before your scheduled surgery time.  Upon arrival, a Pre-op nurse and Anesthesiologist will review your health history, obtain vital signs, and answer questions you may have.  The only belongings needed at this time will be a list of your home medications and if applicable your C-PAP/BI-PAP machine.  If you are staying overnight your family can leave the rest of your belongings in the car and bring them to your room later.  A Pre-op nurse will start an IV and you may receive medication in preparation for surgery, including something to help you relax.  Your family will be able to see you in the Pre-op area.  Two visitors at a time will be allowed in the Pre-op room.  While you are  in surgery your family should notify the waiting room  if they leave the waiting room area and provide a contact phone number.    Please be aware that surgery does come with discomfort.  We want to make every effort to control your discomfort so please discuss any uncontrolled symptoms with your nurse.   Your doctor will most likely have prescribed pain medications.      If you are going home after surgery you will receive individualized written care instructions before being discharged.  A responsible adult must drive you to and from the hospital on the day of your surgery and stay with you for 24 hours.    If you are staying overnight following surgery, you will be transported to your hospital room following the recovery period.  [facility] has all private rooms.    If you have any questions please call Pre-Admission Testing at [phone].  Deductibles and co-payments are collected on the day of service. Please be prepared to pay the required co-pay, deductible or deposit on the day of service as defined by your plan.

## 2020-01-17 ENCOUNTER — ANESTHESIA (OUTPATIENT)
Dept: PERIOP | Facility: HOSPITAL | Age: 75
End: 2020-01-17

## 2020-01-17 ENCOUNTER — ANESTHESIA EVENT (OUTPATIENT)
Dept: PERIOP | Facility: HOSPITAL | Age: 75
End: 2020-01-17

## 2020-01-17 ENCOUNTER — HOSPITAL ENCOUNTER (OUTPATIENT)
Facility: HOSPITAL | Age: 75
Discharge: HOME | End: 2020-01-19
Attending: OPHTHALMOLOGY | Admitting: OPHTHALMOLOGY

## 2020-01-17 PROBLEM — G89.18 POST-OP PAIN: Status: ACTIVE | Noted: 2020-01-17

## 2020-01-17 PROCEDURE — G0378 HOSPITAL OBSERVATION PER HR: HCPCS

## 2020-01-17 PROCEDURE — 63710000001 BRIMONIDINE 0.2 % SOLUTION 5 ML BOTTLE: Performed by: OPHTHALMOLOGY

## 2020-01-17 PROCEDURE — 25010000002 ONDANSETRON PER 1 MG: Performed by: NURSE ANESTHETIST, CERTIFIED REGISTERED

## 2020-01-17 PROCEDURE — A9270 NON-COVERED ITEM OR SERVICE: HCPCS | Performed by: OPHTHALMOLOGY

## 2020-01-17 PROCEDURE — 25010000002 HYDROMORPHONE PER 4 MG: Performed by: ANESTHESIOLOGY

## 2020-01-17 PROCEDURE — 63710000001 LISINOPRIL 10 MG TABLET: Performed by: OPHTHALMOLOGY

## 2020-01-17 PROCEDURE — 25010000002 NEOSTIGMINE PER 0.5 MG: Performed by: NURSE ANESTHETIST, CERTIFIED REGISTERED

## 2020-01-17 PROCEDURE — 25010000002 DEXAMETHASONE PER 1 MG: Performed by: NURSE ANESTHETIST, CERTIFIED REGISTERED

## 2020-01-17 PROCEDURE — 25010000002 PROPOFOL 10 MG/ML EMULSION: Performed by: NURSE ANESTHETIST, CERTIFIED REGISTERED

## 2020-01-17 PROCEDURE — 25010000002 HYDRALAZINE PER 20 MG: Performed by: ANESTHESIOLOGY

## 2020-01-17 PROCEDURE — 25010000002 FENTANYL CITRATE (PF) 100 MCG/2ML SOLUTION: Performed by: ANESTHESIOLOGY

## 2020-01-17 PROCEDURE — 63710000001 DORZOLAMIDE 2 % SOLUTION 10 ML BOTTLE: Performed by: OPHTHALMOLOGY

## 2020-01-17 PROCEDURE — 63710000001 ATORVASTATIN 10 MG TABLET: Performed by: OPHTHALMOLOGY

## 2020-01-17 PROCEDURE — 63710000001 TIMOLOL 0.5 % SOLUTION 5 ML BOTTLE: Performed by: OPHTHALMOLOGY

## 2020-01-17 PROCEDURE — 25010000002 PROMETHAZINE PER 50 MG: Performed by: ANESTHESIOLOGY

## 2020-01-17 PROCEDURE — 63710000001 MONTELUKAST 10 MG TABLET: Performed by: OPHTHALMOLOGY

## 2020-01-17 DEVICE — SCLERAL TISSUE DONOR: Type: IMPLANTABLE DEVICE | Site: ORBIT | Status: FUNCTIONAL

## 2020-01-17 RX ORDER — SODIUM CHLORIDE 0.9 % (FLUSH) 0.9 %
10 SYRINGE (ML) INJECTION AS NEEDED
Status: DISCONTINUED | OUTPATIENT
Start: 2020-01-17 | End: 2020-01-19

## 2020-01-17 RX ORDER — PROMETHAZINE HYDROCHLORIDE 25 MG/ML
6.25 INJECTION, SOLUTION INTRAMUSCULAR; INTRAVENOUS ONCE AS NEEDED
Status: COMPLETED | OUTPATIENT
Start: 2020-01-17 | End: 2020-01-17

## 2020-01-17 RX ORDER — OXYCODONE AND ACETAMINOPHEN 7.5; 325 MG/1; MG/1
1 TABLET ORAL EVERY 4 HOURS PRN
Status: DISCONTINUED | OUTPATIENT
Start: 2020-01-17 | End: 2020-01-19

## 2020-01-17 RX ORDER — SODIUM CHLORIDE 0.9 % (FLUSH) 0.9 %
3-10 SYRINGE (ML) INJECTION AS NEEDED
Status: DISCONTINUED | OUTPATIENT
Start: 2020-01-17 | End: 2020-01-17 | Stop reason: HOSPADM

## 2020-01-17 RX ORDER — HYDROMORPHONE HYDROCHLORIDE 1 MG/ML
0.5 INJECTION, SOLUTION INTRAMUSCULAR; INTRAVENOUS; SUBCUTANEOUS
Status: DISCONTINUED | OUTPATIENT
Start: 2020-01-17 | End: 2020-01-17 | Stop reason: HOSPADM

## 2020-01-17 RX ORDER — EPHEDRINE SULFATE 50 MG/ML
5 INJECTION, SOLUTION INTRAVENOUS ONCE AS NEEDED
Status: DISCONTINUED | OUTPATIENT
Start: 2020-01-17 | End: 2020-01-17 | Stop reason: HOSPADM

## 2020-01-17 RX ORDER — DORZOLAMIDE HYDROCHLORIDE AND TIMOLOL MALEATE 20; 5 MG/ML; MG/ML
1 SOLUTION/ DROPS OPHTHALMIC 2 TIMES DAILY
Status: DISCONTINUED | OUTPATIENT
Start: 2020-01-17 | End: 2020-01-17 | Stop reason: CLARIF

## 2020-01-17 RX ORDER — MORPHINE SULFATE 2 MG/ML
1 INJECTION, SOLUTION INTRAMUSCULAR; INTRAVENOUS EVERY 4 HOURS PRN
Status: DISCONTINUED | OUTPATIENT
Start: 2020-01-17 | End: 2020-01-19

## 2020-01-17 RX ORDER — LEVOTHYROXINE SODIUM 0.07 MG/1
75 TABLET ORAL
Status: DISCONTINUED | OUTPATIENT
Start: 2020-01-18 | End: 2020-01-19

## 2020-01-17 RX ORDER — NALOXONE HCL 0.4 MG/ML
0.4 VIAL (ML) INJECTION
Status: DISCONTINUED | OUTPATIENT
Start: 2020-01-17 | End: 2020-01-19

## 2020-01-17 RX ORDER — HYDROMORPHONE HYDROCHLORIDE 1 MG/ML
0.5 INJECTION, SOLUTION INTRAMUSCULAR; INTRAVENOUS; SUBCUTANEOUS
Status: DISCONTINUED | OUTPATIENT
Start: 2020-01-17 | End: 2020-01-19

## 2020-01-17 RX ORDER — OXYMETAZOLINE HYDROCHLORIDE 0.05 G/100ML
SPRAY NASAL AS NEEDED
Status: DISCONTINUED | OUTPATIENT
Start: 2020-01-17 | End: 2020-01-17 | Stop reason: HOSPADM

## 2020-01-17 RX ORDER — SODIUM CHLORIDE, SODIUM LACTATE, POTASSIUM CHLORIDE, CALCIUM CHLORIDE 600; 310; 30; 20 MG/100ML; MG/100ML; MG/100ML; MG/100ML
9 INJECTION, SOLUTION INTRAVENOUS CONTINUOUS
Status: DISCONTINUED | OUTPATIENT
Start: 2020-01-17 | End: 2020-01-19

## 2020-01-17 RX ORDER — TIMOLOL MALEATE 5 MG/ML
1 SOLUTION/ DROPS OPHTHALMIC EVERY 12 HOURS SCHEDULED
Status: DISCONTINUED | OUTPATIENT
Start: 2020-01-17 | End: 2020-01-19

## 2020-01-17 RX ORDER — MIDAZOLAM HYDROCHLORIDE 1 MG/ML
1 INJECTION INTRAMUSCULAR; INTRAVENOUS
Status: DISCONTINUED | OUTPATIENT
Start: 2020-01-17 | End: 2020-01-17 | Stop reason: HOSPADM

## 2020-01-17 RX ORDER — MAGNESIUM HYDROXIDE 1200 MG/15ML
LIQUID ORAL AS NEEDED
Status: DISCONTINUED | OUTPATIENT
Start: 2020-01-17 | End: 2020-01-17 | Stop reason: HOSPADM

## 2020-01-17 RX ORDER — LIDOCAINE HYDROCHLORIDE 40 MG/ML
SOLUTION TOPICAL AS NEEDED
Status: DISCONTINUED | OUTPATIENT
Start: 2020-01-17 | End: 2020-01-17 | Stop reason: SURG

## 2020-01-17 RX ORDER — MIDAZOLAM HYDROCHLORIDE 1 MG/ML
2 INJECTION INTRAMUSCULAR; INTRAVENOUS
Status: DISCONTINUED | OUTPATIENT
Start: 2020-01-17 | End: 2020-01-17 | Stop reason: HOSPADM

## 2020-01-17 RX ORDER — ATORVASTATIN CALCIUM 10 MG/1
10 TABLET, FILM COATED ORAL NIGHTLY
Status: DISCONTINUED | OUTPATIENT
Start: 2020-01-17 | End: 2020-01-19

## 2020-01-17 RX ORDER — FAMOTIDINE 10 MG/ML
20 INJECTION, SOLUTION INTRAVENOUS ONCE
Status: COMPLETED | OUTPATIENT
Start: 2020-01-17 | End: 2020-01-17

## 2020-01-17 RX ORDER — LIDOCAINE HYDROCHLORIDE 20 MG/ML
INJECTION, SOLUTION INFILTRATION; PERINEURAL AS NEEDED
Status: DISCONTINUED | OUTPATIENT
Start: 2020-01-17 | End: 2020-01-17 | Stop reason: SURG

## 2020-01-17 RX ORDER — SODIUM CHLORIDE 0.9 % (FLUSH) 0.9 %
10 SYRINGE (ML) INJECTION EVERY 12 HOURS SCHEDULED
Status: DISCONTINUED | OUTPATIENT
Start: 2020-01-17 | End: 2020-01-19

## 2020-01-17 RX ORDER — LIDOCAINE HYDROCHLORIDE 40 MG/ML
SOLUTION TOPICAL
Status: COMPLETED
Start: 2020-01-17 | End: 2020-01-17

## 2020-01-17 RX ORDER — BRIMONIDINE TARTRATE 2 MG/ML
1 SOLUTION/ DROPS OPHTHALMIC 2 TIMES DAILY
Status: DISCONTINUED | OUTPATIENT
Start: 2020-01-17 | End: 2020-01-19

## 2020-01-17 RX ORDER — DORZOLAMIDE HCL 20 MG/ML
1 SOLUTION/ DROPS OPHTHALMIC 2 TIMES DAILY
Status: DISCONTINUED | OUTPATIENT
Start: 2020-01-17 | End: 2020-01-19

## 2020-01-17 RX ORDER — DEXAMETHASONE SODIUM PHOSPHATE 10 MG/ML
INJECTION INTRAMUSCULAR; INTRAVENOUS AS NEEDED
Status: DISCONTINUED | OUTPATIENT
Start: 2020-01-17 | End: 2020-01-17 | Stop reason: SURG

## 2020-01-17 RX ORDER — GLYCOPYRROLATE 0.2 MG/ML
INJECTION INTRAMUSCULAR; INTRAVENOUS AS NEEDED
Status: DISCONTINUED | OUTPATIENT
Start: 2020-01-17 | End: 2020-01-17 | Stop reason: SURG

## 2020-01-17 RX ORDER — SCOLOPAMINE TRANSDERMAL SYSTEM 1 MG/1
1 PATCH, EXTENDED RELEASE TRANSDERMAL ONCE
Status: COMPLETED | OUTPATIENT
Start: 2020-01-17 | End: 2020-01-18

## 2020-01-17 RX ORDER — PROMETHAZINE HYDROCHLORIDE 25 MG/1
25 SUPPOSITORY RECTAL ONCE AS NEEDED
Status: COMPLETED | OUTPATIENT
Start: 2020-01-17 | End: 2020-01-17

## 2020-01-17 RX ORDER — EPHEDRINE SULFATE 50 MG/ML
INJECTION, SOLUTION INTRAVENOUS AS NEEDED
Status: DISCONTINUED | OUTPATIENT
Start: 2020-01-17 | End: 2020-01-17 | Stop reason: SURG

## 2020-01-17 RX ORDER — ERYTHROMYCIN 5 MG/G
OINTMENT OPHTHALMIC AS NEEDED
Status: DISCONTINUED | OUTPATIENT
Start: 2020-01-17 | End: 2020-01-17 | Stop reason: HOSPADM

## 2020-01-17 RX ORDER — LISINOPRIL 10 MG/1
10 TABLET ORAL DAILY
Status: DISCONTINUED | OUTPATIENT
Start: 2020-01-17 | End: 2020-01-19

## 2020-01-17 RX ORDER — SODIUM CHLORIDE 0.9 % (FLUSH) 0.9 %
3 SYRINGE (ML) INJECTION EVERY 12 HOURS SCHEDULED
Status: DISCONTINUED | OUTPATIENT
Start: 2020-01-17 | End: 2020-01-17 | Stop reason: HOSPADM

## 2020-01-17 RX ORDER — PROMETHAZINE HYDROCHLORIDE 25 MG/1
25 TABLET ORAL ONCE AS NEEDED
Status: COMPLETED | OUTPATIENT
Start: 2020-01-17 | End: 2020-01-17

## 2020-01-17 RX ORDER — FLUMAZENIL 0.1 MG/ML
0.2 INJECTION INTRAVENOUS AS NEEDED
Status: DISCONTINUED | OUTPATIENT
Start: 2020-01-17 | End: 2020-01-17 | Stop reason: HOSPADM

## 2020-01-17 RX ORDER — FENTANYL CITRATE 50 UG/ML
50 INJECTION, SOLUTION INTRAMUSCULAR; INTRAVENOUS
Status: DISCONTINUED | OUTPATIENT
Start: 2020-01-17 | End: 2020-01-17 | Stop reason: HOSPADM

## 2020-01-17 RX ORDER — LIDOCAINE HYDROCHLORIDE 10 MG/ML
0.5 INJECTION, SOLUTION EPIDURAL; INFILTRATION; INTRACAUDAL; PERINEURAL ONCE AS NEEDED
Status: DISCONTINUED | OUTPATIENT
Start: 2020-01-17 | End: 2020-01-17 | Stop reason: HOSPADM

## 2020-01-17 RX ORDER — HYDROCODONE BITARTRATE AND ACETAMINOPHEN 7.5; 325 MG/1; MG/1
1 TABLET ORAL EVERY 6 HOURS PRN
Qty: 15 TABLET | Refills: 0 | Status: ON HOLD | OUTPATIENT
Start: 2020-01-17 | End: 2020-03-12 | Stop reason: SDUPTHER

## 2020-01-17 RX ORDER — MONTELUKAST SODIUM 10 MG/1
10 TABLET ORAL NIGHTLY
Status: DISCONTINUED | OUTPATIENT
Start: 2020-01-17 | End: 2020-01-19

## 2020-01-17 RX ORDER — FLUTICASONE PROPIONATE 50 MCG
2 SPRAY, SUSPENSION (ML) NASAL DAILY
Status: DISCONTINUED | OUTPATIENT
Start: 2020-01-17 | End: 2020-01-19

## 2020-01-17 RX ORDER — ERYTHROMYCIN 5 MG/G
OINTMENT OPHTHALMIC 2 TIMES DAILY
Qty: 3.5 G | Refills: 2 | Status: SHIPPED | OUTPATIENT
Start: 2020-01-17 | End: 2020-02-16

## 2020-01-17 RX ORDER — HYDRALAZINE HYDROCHLORIDE 20 MG/ML
5 INJECTION INTRAMUSCULAR; INTRAVENOUS
Status: DISCONTINUED | OUTPATIENT
Start: 2020-01-17 | End: 2020-01-17 | Stop reason: HOSPADM

## 2020-01-17 RX ORDER — PROPOFOL 10 MG/ML
VIAL (ML) INTRAVENOUS AS NEEDED
Status: DISCONTINUED | OUTPATIENT
Start: 2020-01-17 | End: 2020-01-17 | Stop reason: SURG

## 2020-01-17 RX ORDER — ROCURONIUM BROMIDE 10 MG/ML
INJECTION, SOLUTION INTRAVENOUS AS NEEDED
Status: DISCONTINUED | OUTPATIENT
Start: 2020-01-17 | End: 2020-01-17 | Stop reason: SURG

## 2020-01-17 RX ORDER — ONDANSETRON 2 MG/ML
INJECTION INTRAMUSCULAR; INTRAVENOUS AS NEEDED
Status: DISCONTINUED | OUTPATIENT
Start: 2020-01-17 | End: 2020-01-17 | Stop reason: SURG

## 2020-01-17 RX ORDER — ONDANSETRON 2 MG/ML
4 INJECTION INTRAMUSCULAR; INTRAVENOUS ONCE AS NEEDED
Status: DISCONTINUED | OUTPATIENT
Start: 2020-01-17 | End: 2020-01-17 | Stop reason: HOSPADM

## 2020-01-17 RX ORDER — FENTANYL CITRATE 50 UG/ML
100 INJECTION, SOLUTION INTRAMUSCULAR; INTRAVENOUS
Status: DISCONTINUED | OUTPATIENT
Start: 2020-01-17 | End: 2020-01-17 | Stop reason: HOSPADM

## 2020-01-17 RX ORDER — HYDROCODONE BITARTRATE AND ACETAMINOPHEN 7.5; 325 MG/1; MG/1
1 TABLET ORAL ONCE AS NEEDED
Status: DISCONTINUED | OUTPATIENT
Start: 2020-01-17 | End: 2020-01-17 | Stop reason: HOSPADM

## 2020-01-17 RX ADMIN — EPHEDRINE SULFATE 5 MG: 50 INJECTION INTRAVENOUS at 09:36

## 2020-01-17 RX ADMIN — PROPOFOL 100 MCG/KG/MIN: 10 INJECTION, EMULSION INTRAVENOUS at 07:58

## 2020-01-17 RX ADMIN — ONDANSETRON HYDROCHLORIDE 4 MG: 2 SOLUTION INTRAMUSCULAR; INTRAVENOUS at 09:04

## 2020-01-17 RX ADMIN — BRIMONIDINE TARTRATE 1 DROP: 2 SOLUTION OPHTHALMIC at 22:19

## 2020-01-17 RX ADMIN — Medication 3 MG: at 09:09

## 2020-01-17 RX ADMIN — LIDOCAINE HYDROCHLORIDE 100 MG: 20 INJECTION, SOLUTION INFILTRATION; PERINEURAL at 07:58

## 2020-01-17 RX ADMIN — SCOPALAMINE 1 PATCH: 1 PATCH, EXTENDED RELEASE TRANSDERMAL at 06:34

## 2020-01-17 RX ADMIN — GLYCOPYRROLATE 0.5 MG: 0.2 INJECTION INTRAMUSCULAR; INTRAVENOUS at 09:09

## 2020-01-17 RX ADMIN — DORZOLAMIDE HYDROCHLORIDE 1 DROP: 20 SOLUTION/ DROPS OPHTHALMIC at 22:32

## 2020-01-17 RX ADMIN — SODIUM CHLORIDE, PRESERVATIVE FREE 10 ML: 5 INJECTION INTRAVENOUS at 22:37

## 2020-01-17 RX ADMIN — FENTANYL CITRATE 50 MCG: 50 INJECTION, SOLUTION INTRAMUSCULAR; INTRAVENOUS at 10:51

## 2020-01-17 RX ADMIN — SODIUM CHLORIDE, POTASSIUM CHLORIDE, SODIUM LACTATE AND CALCIUM CHLORIDE: 600; 310; 30; 20 INJECTION, SOLUTION INTRAVENOUS at 09:10

## 2020-01-17 RX ADMIN — FENTANYL CITRATE 50 MCG: 50 INJECTION, SOLUTION INTRAMUSCULAR; INTRAVENOUS at 11:06

## 2020-01-17 RX ADMIN — PROMETHAZINE HYDROCHLORIDE 6.25 MG: 25 INJECTION INTRAMUSCULAR; INTRAVENOUS at 11:32

## 2020-01-17 RX ADMIN — PROPOFOL 50 MG: 10 INJECTION, EMULSION INTRAVENOUS at 09:24

## 2020-01-17 RX ADMIN — HYDROMORPHONE HYDROCHLORIDE 0.5 MG: 1 INJECTION, SOLUTION INTRAMUSCULAR; INTRAVENOUS; SUBCUTANEOUS at 13:31

## 2020-01-17 RX ADMIN — PROPOFOL 100 MG: 10 INJECTION, EMULSION INTRAVENOUS at 07:58

## 2020-01-17 RX ADMIN — TIMOLOL MALEATE 1 DROP: 5 SOLUTION OPHTHALMIC at 21:44

## 2020-01-17 RX ADMIN — GLYCOPYRROLATE 0.1 MG: 0.2 INJECTION INTRAMUSCULAR; INTRAVENOUS at 09:43

## 2020-01-17 RX ADMIN — SODIUM CHLORIDE, POTASSIUM CHLORIDE, SODIUM LACTATE AND CALCIUM CHLORIDE 9 ML/HR: 600; 310; 30; 20 INJECTION, SOLUTION INTRAVENOUS at 06:34

## 2020-01-17 RX ADMIN — DEXAMETHASONE SODIUM PHOSPHATE 4 MG: 10 INJECTION INTRAMUSCULAR; INTRAVENOUS at 08:06

## 2020-01-17 RX ADMIN — HYDRALAZINE HYDROCHLORIDE 5 MG: 20 INJECTION INTRAMUSCULAR; INTRAVENOUS at 10:48

## 2020-01-17 RX ADMIN — FAMOTIDINE 20 MG: 10 INJECTION INTRAVENOUS at 06:34

## 2020-01-17 RX ADMIN — LIDOCAINE HYDROCHLORIDE 1 EACH: 40 SOLUTION TOPICAL at 08:01

## 2020-01-17 RX ADMIN — PROPOFOL 30 MG: 10 INJECTION, EMULSION INTRAVENOUS at 08:25

## 2020-01-17 RX ADMIN — ROCURONIUM BROMIDE 30 MG: 10 INJECTION, SOLUTION INTRAVENOUS at 07:58

## 2020-01-17 RX ADMIN — ATORVASTATIN CALCIUM 10 MG: 10 TABLET, FILM COATED ORAL at 21:41

## 2020-01-17 RX ADMIN — MONTELUKAST SODIUM 10 MG: 10 TABLET, FILM COATED ORAL at 21:42

## 2020-01-17 RX ADMIN — LISINOPRIL 10 MG: 10 TABLET ORAL at 21:41

## 2020-01-17 NOTE — PERIOPERATIVE NURSING NOTE
Finish time was charted at 09:16, but surgeon had to continue with procedure . Pt not emerged from anesthesia at this time.  New surgical pack opened , and some extra supplies, due to the sterile field being contaminated.

## 2020-01-17 NOTE — PERIOPERATIVE NURSING NOTE
Dr Morrison here ad pulled rt nostril packing.pt tolerated with no further bleeding after. Pt given the option to stay overnight.

## 2020-01-17 NOTE — PERIOPERATIVE NURSING NOTE
Pt having large amount of blood in back of throat. SpO2 99% on face tent. Dr. Morrison & Dr. Clifton called to bedside to evaluate. No new orders.

## 2020-01-17 NOTE — PERIOPERATIVE NURSING NOTE
Pt's friend, Andree, updated and notified that pt will be staying overnight for observation. Pt's prescriptions from pharmacy & purse sent home with friend. The remainder of pt's belongings sent with pt.

## 2020-01-17 NOTE — ANESTHESIA PREPROCEDURE EVALUATION
Anesthesia Evaluation     Patient summary reviewed and Nursing notes reviewed   history of anesthetic complications: PONV  NPO Solid Status: > 8 hours             Airway   Mallampati: II  TM distance: >3 FB  Neck ROM: full  no difficulty expected  Dental - normal exam   (+) partials and lower dentures    Pulmonary - negative pulmonary ROS and normal exam   Cardiovascular - normal exam    (+) hypertension,       Neuro/Psych- negative ROS  GI/Hepatic/Renal/Endo - negative ROS     Musculoskeletal (-) negative ROS    Abdominal  - normal exam   Substance History - negative use     OB/GYN negative ob/gyn ROS         Other        ROS/Med Hx Other: RA                  Anesthesia Plan    ASA 2     general   total IV anesthesia(Severe ponv)  intravenous induction     Anesthetic plan, all risks, benefits, and alternatives have been provided, discussed and informed consent has been obtained with: patient.    Plan discussed with CRNA.

## 2020-01-17 NOTE — ANESTHESIA POSTPROCEDURE EVALUATION
Patient: Jasmine BEAR Banegas    Procedure Summary     Date:  01/17/20 Room / Location:  [facility]  /  [facility]    Anesthesia Start:  0752 Anesthesia Stop:  1032    Procedures:       RIGHT ORBITAL DECOMPRESSION (Right Face)      RIGHT EXTERNAL ETHMOIDECTOMY, RIGHT LOWER LID RETRACTION REPAIR WITH EYE BANK SCLERA (Right Eye) Diagnosis:      Surgeon:  Negrito Dennis MD Provider:  Martin Clifton MD    Anesthesia Type:  general ASA Status:  2          Anesthesia Type: general    Vitals  Vitals Value Taken Time   /59 1/17/2020  2:00 PM   Temp 37.1 °C (98.8 °F) 1/17/2020  1:00 PM   Pulse 86 1/17/2020  2:04 PM   Resp 16 1/17/2020  1:45 PM   SpO2 91 % 1/17/2020  2:04 PM   Vitals shown include unvalidated device data.        Post Anesthesia Care and Evaluation    Patient location during evaluation: bedside  Patient participation: complete - patient participated  Level of consciousness: awake  Pain score: 1  Pain management: adequate  Airway patency: patent  Anesthetic complications: No anesthetic complications  PONV Status: controlled  Cardiovascular status: acceptable  Respiratory status: acceptable  Hydration status: acceptable    Comments: --------------------            01/17/20               1345     --------------------   BP:       115/62     Pulse:      84       Resp:       16       Temp:                SpO2:      92%      --------------------

## 2020-01-17 NOTE — PERIOPERATIVE NURSING NOTE
Unable to wean pt off oxygen. Remaining on 2L nasal cannula with spO2 93% at this time. No bleeding since nasal packing was removed. Dr. Morrison called and notified. MD states he would like to admit pt overnight for observation.

## 2020-01-17 NOTE — H&P
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 female presents with above. No new meds/health problems since office visit      Allergies:   Allergies   Allergen Reactions   • Sulfa Antibiotics Hives       10 point review of systems negative, except pertaining to the HPI    Medications:   Home Medications:  No current facility-administered medications on file prior to encounter.      Current Outpatient Medications on File Prior to Encounter   Medication Sig   • brimonidine (ALPHAGAN) 0.2 % ophthalmic solution Administer 1 drop to both eyes 2 (Two) Times a Day.   • Calcium-Magnesium-Vitamin D ER (CALCIUM 1200+D3) 600- MG-MG-UNIT tablet sustained-release 24 hour Take 1 tablet by mouth Every Morning. PT HOLDING FOR SURGERY   • Coenzyme Q10 (COQ-10) 200 MG capsule Take 1 tablet/day by mouth Every Morning. PT HOLDING FOR SURGERY   • denosumab (PROLIA) 60 MG/ML solution syringe Inject 60 mg under the skin into the appropriate area as directed Every 6 (Six) Months.   • dorzolamide-timolol (COSOPT) 22.3-6.8 MG/ML ophthalmic solution Administer 1 drop to both eyes 2 (Two) Times a Day.   • fluticasone (FLONASE) 50 MCG/ACT nasal spray 2 sprays into the nostril(s) as directed by provider Daily. (Patient taking differently: 2 sprays into the nostril(s) as directed by provider Every Evening.)   • levothyroxine (SYNTHROID, LEVOTHROID) 75 MCG tablet Take 1 tablet by mouth Daily. (Patient taking differently: Take 75 mcg by mouth Every Morning.)   • lisinopril (PRINIVIL,ZESTRIL) 10 MG tablet Take 1 tablet by mouth Daily. (Patient taking differently: Take 10 mg by mouth Every Night.)   • montelukast (SINGULAIR) 10 MG tablet Take 1 tablet by mouth Daily. (Patient taking differently: Take 10 mg  by mouth Every Night.)   • Multiple Vitamins-Minerals (MULTIVITAMIN ADULT PO) Take 1 tablet by mouth Every Morning. PT HOLDING FOR SURGERY   • Omega-3 Fatty Acids (FISH OIL) 1200 MG capsule delayed-release Take 1 capsule by mouth Every Morning. PT HOLDING FOR SURGERY   • simvastatin (ZOCOR) 20 MG tablet Take 1 tablet by mouth every night at bedtime.     Current Medications:  Scheduled Meds:  lidocaine      Scopolamine 1 patch Transdermal Once   sodium chloride 3 mL Intravenous Q12H     Continuous Infusions:  lactated ringers 9 mL/hr Last Rate: 9 mL/hr (01/17/20 0634)     PRN Meds:.•  fentanyl  •  lidocaine PF 1%  •  midazolam **OR** midazolam  •  sodium chloride    Past Medical History:   Diagnosis Date   • Brittle bones    • Glaucoma    • Graves disease    • History of transfusion    • Hyperlipidemia    • Hypertension    • Osteoporosis    • Overactive bladder    • PONV (postoperative nausea and vomiting)     SEVERE   • RA (rheumatoid arthritis) (CMS/HCC)    • Slow to wake up after anesthesia         Past Surgical History:   Procedure Laterality Date   • COLONOSCOPY     • FEMUR FRACTURE SURGERY Left    • HIP FRACTURE SURGERY Bilateral     pin & plates in left hip; pin & plate removed right   • TOTAL THYROIDECTOMY          Social History     Occupational History   • Not on file   Tobacco Use   • Smoking status: Never Smoker   • Smokeless tobacco: Never Used   Substance and Sexual Activity   • Alcohol use: Yes     Comment: rare   • Drug use: Never   • Sexual activity: Defer    Social History     Social History Narrative   • Not on file        Family History   Problem Relation Age of Onset   • Hypertension Mother    • Heart disease Father    • Alcohol abuse Brother    • Malig Hyperthermia Neg Hx            Physical Exam   Constitutional: Alert, cooperative, in no acute distress    Head: Normocephalic.   Eyes:   graves ophthalmopathy with proptosis and eyelid retraction, scleral injection, right  Neck: Normal range of  motion.   Cardiovascular: Normal rate.    Pulmonary/Chest: Effort normal.   Neurological: Alert.   Skin: Skin is warm.   Psychiatric: Normal mood and affect.       Assessment/Plan:  The patient voiced understanding of the risks, benefits, and alternative forms of treatment that were discussed and the patient consents to proceed with RIGHT ORBITAL DECOMPRESSION.       Jesse Morrison Jr, MD

## 2020-01-17 NOTE — PLAN OF CARE
Problem: Patient Care Overview  Goal: Plan of Care Review  Outcome: Ongoing (interventions implemented as appropriate)  Flowsheets (Taken 1/17/2020 3219)  Outcome Summary: VSS. IV SL. Complaints of pain around 5 also right eye swollen, ice pack applied to that eye. On 1L NC.

## 2020-01-17 NOTE — OP NOTE
OPERATIVE NOTE    Patient Identification:  Name: Jasmine Banegas  Age: 74 y.o.  Sex: female  :  1945  MRN: [id number]                                               Preoperative diagnosis: 1.  Graves with proptosis.  Right lower eyelid retraction  Postoperative diagnosis: same  Procedure: 1.  Right orbitotomy for decompression 2.  External ethmoidectomy 3.  Right lower eyelid retraction repair with eyebank sclera  Surgeon: Dr. Negrito Dennis who was present and scrubbed throughout all critical portions of the operation  Assistants: Jesse Morrison Jr, MD   Anesthesia: General  EBL: 400cc  Specimens:  * No orders in the log *    Description of the procedure: The patient was taken to the operating room and placed on the table in the supine position, where anesthesia was induced. 2% lidocaine with epinephrine and 0.5% marcaine in a 1:1 fashion was injected over the surgical site, and the patient was prepped and draped in the usual manner for orbitofacial surgery.     Corneal protectors were placed in both eyes.     A 15 Bard-Jesus blade incision was made at the right lateral canthus. Sharp dissection was carried down to the lateral orbital rim periosteum. The inferior ramus of the lateral canthal tendon was identified and severed with sharp dissection. The lower lid was everted, and a combination of a 15 Bard-Jesus blade incision and monopolar cautery incision was made through the inferior fornix, down to the inferior orbital rim periosteum. The periosteum was opened with sharp dissection. The orbital floor periorbita was elevated with blunt dissection. Osteotomes and Kerrison rongeurs were used to remove the entire orbital floor, taking care to preserve the infraorbital neurovascular bundle. The maxillary sinus mucosa was opened with electrocauterization. The orbital floor periosteum was opened with sharp dissection. Gentle pressure was applied to the globe to encourage herniation of orbital contents  into the maxillary sinus.     At the conclusion of the inferior orbitotomy for decompression, attention was turned to the external ethmoidectomy. An external ethmoidectomy incision was made along the lateral nasal line anterior to the medial canthal tendon. Sharp dissection was carried down to the medial orbital wall. The lacrimal outflow system was retracted laterally. A subperiosteal dissection plane was carried out to expose the medial orbital wall. The medial orbital wall and lateral aspect of the ethmoid sinus was removed below the anterior and posterior ethmoidal arteries. The ethmoidal sinus was entered, and a complete extirpation of the ethmoid sinus was carried out. After removing the air cells and mucosa, bleeding was controlled with Gelfoam saturated in topical thrombin and Floseal. The medial orbital periosteum was opened with sharp dissection, and gentle pressure was again applied to the globe to encourage herniation of tissue into the ethmoid sinus. The infraorbital bony strut was preserved. The medial canthal tendon was restored to its original position with 5-0 vicryl suture, and the ethmoidectomy incision was closed with 5-0 fast absorbing suture.     At the conclusion of the ethmoidectomy, attention was turned to the eyelid retraction repair. The lower lid was everted and 7 mm eye bank sclera was placed into the inferior retractor muscle layer, where it was sutured to the conjunctiva with 5-0 fast absorbing suture. The lateral canthal tendon was re-suspended with 5-0 vicryl suture anchored to the lateral orbital rim periosteum. The skin was closed with 5-0 fast absorbing suture.     The corneal protectors were removed and antibiotic ophthalmic ointment was placed over the surgical site.     At the conclusion of the case, bleeding was noted from both incision sites as well as the nose. The orbit became tense. The decision was made to take down the lateral canthal and medial canthal tendon incisions  for exploration. Blood was suctioned out from the medial wall and orbital floor. Additional gelfoam and thrombin was used for hemostasis. Additional afrin and cottonoids were placed in the right nasal vault.  Once again she was closed, the lower lid was everted and 7 mm eye bank sclera was placed into the inferior retractor muscle layer, where it was resutured to the conjunctiva with 5-0 fast absorbing suture. The lateral canthal tendon was re-suspended with 5-0 vicryl suture anchored to the superior lateral canthal tendon. The skin was closed with 5-0 vicryl suture anchored to the external rim periosteum.     The patient was then awakened and taken from the operating room in good condition, having tolerated the procedure well. There were no complications, and the estimated blood loss was 400 cc.

## 2020-01-17 NOTE — ANESTHESIA PROCEDURE NOTES
Airway  Urgency: elective    Date/Time: 1/17/2020 8:01 AM  Airway not difficult    General Information and Staff    Patient location during procedure: OR  Anesthesiologist: Martin Clifton MD  CRNA: Seferino Marquez CRNA    Indications and Patient Condition  Indications for airway management: airway protection    Preoxygenated: yes  MILS maintained throughout  Mask difficulty assessment: 2 - vent by mask + OA or adjuvant +/- NMBA    Final Airway Details  Final airway type: endotracheal airway      Successful airway: ETT  Cuffed: yes   Successful intubation technique: direct laryngoscopy  Facilitating devices/methods: intubating stylet  Endotracheal tube insertion site: oral  Blade: Silvestre  Blade size: 3  ETT size (mm): 7.0  Cormack-Lehane Classification: grade I - full view of glottis  Placement verified by: chest auscultation and capnometry   Cuff volume (mL): 7  Measured from: lips  Number of attempts at approach: 1  Assessment: lips, teeth, and gum same as pre-op and atraumatic intubation    Additional Comments  Patient in OR. Monitors on. BLVS. Pre 02 100%. SIVI. Artifical dental work noted. DL x1. DVVC. Atraumatic placement of ETT. Placement verified with ETC02 and BBS. ETT secured. Teeth/lips in pre-op condition.

## 2020-01-18 PROCEDURE — 63710000001 MONTELUKAST 10 MG TABLET: Performed by: OPHTHALMOLOGY

## 2020-01-18 PROCEDURE — G0378 HOSPITAL OBSERVATION PER HR: HCPCS

## 2020-01-18 PROCEDURE — A9270 NON-COVERED ITEM OR SERVICE: HCPCS | Performed by: OPHTHALMOLOGY

## 2020-01-18 PROCEDURE — 63710000001 ATORVASTATIN 10 MG TABLET: Performed by: OPHTHALMOLOGY

## 2020-01-18 PROCEDURE — 63710000001 FLUTICASONE 50 MCG/ACT SUSPENSION 16 G BOTTLE: Performed by: OPHTHALMOLOGY

## 2020-01-18 PROCEDURE — 63710000001 ERYTHROMYCIN 5 MG/GM OINTMENT 1 G TUBE: Performed by: OPHTHALMOLOGY

## 2020-01-18 PROCEDURE — 63710000001 LEVOTHYROXINE 75 MCG TABLET: Performed by: OPHTHALMOLOGY

## 2020-01-18 RX ORDER — ERYTHROMYCIN 5 MG/G
OINTMENT OPHTHALMIC EVERY 12 HOURS
Status: CANCELLED | OUTPATIENT
Start: 2020-01-18

## 2020-01-18 RX ORDER — ERYTHROMYCIN 5 MG/G
OINTMENT OPHTHALMIC EVERY 12 HOURS
Status: DISCONTINUED | OUTPATIENT
Start: 2020-01-18 | End: 2020-01-19

## 2020-01-18 RX ADMIN — TIMOLOL MALEATE 1 DROP: 5 SOLUTION OPHTHALMIC at 21:51

## 2020-01-18 RX ADMIN — MONTELUKAST SODIUM 10 MG: 10 TABLET, FILM COATED ORAL at 20:25

## 2020-01-18 RX ADMIN — DORZOLAMIDE HYDROCHLORIDE 1 DROP: 20 SOLUTION/ DROPS OPHTHALMIC at 09:02

## 2020-01-18 RX ADMIN — LEVOTHYROXINE SODIUM 75 MCG: 75 TABLET ORAL at 06:53

## 2020-01-18 RX ADMIN — DORZOLAMIDE HYDROCHLORIDE 1 DROP: 20 SOLUTION/ DROPS OPHTHALMIC at 21:42

## 2020-01-18 RX ADMIN — ERYTHROMYCIN: 5 OINTMENT OPHTHALMIC at 20:21

## 2020-01-18 RX ADMIN — ATORVASTATIN CALCIUM 10 MG: 10 TABLET, FILM COATED ORAL at 20:23

## 2020-01-18 RX ADMIN — BRIMONIDINE TARTRATE 1 DROP: 2 SOLUTION OPHTHALMIC at 21:08

## 2020-01-18 RX ADMIN — BRIMONIDINE TARTRATE 1 DROP: 2 SOLUTION OPHTHALMIC at 09:02

## 2020-01-18 RX ADMIN — TIMOLOL MALEATE 1 DROP: 5 SOLUTION OPHTHALMIC at 09:03

## 2020-01-18 RX ADMIN — SODIUM CHLORIDE, PRESERVATIVE FREE 10 ML: 5 INJECTION INTRAVENOUS at 20:25

## 2020-01-18 RX ADMIN — FLUTICASONE PROPIONATE 2 SPRAY: 50 SPRAY, METERED NASAL at 09:02

## 2020-01-18 RX ADMIN — SODIUM CHLORIDE, PRESERVATIVE FREE 10 ML: 5 INJECTION INTRAVENOUS at 09:03

## 2020-01-18 NOTE — PLAN OF CARE
Problem: Patient Care Overview  Goal: Plan of Care Review  Outcome: Ongoing (interventions implemented as appropriate)  Flowsheets (Taken 1/18/2020 1229)  Progress: improving  Plan of Care Reviewed With: patient  Note:   Vss.  Denies pain.  Had slight nasal drainage and small emesis of blood last night.  Rt eye swollen, sutures intact. Moist gauze and ice packs to rt eye.  Voiding freely

## 2020-01-18 NOTE — PROGRESS NOTES
OPHTHALMOLOGY PROGRESS NOTE    Patient Identification:  Name: Jasmine Banegas  Age: 74 y.o.  Sex: female  :  1945  MRN: [id number]               Hospital Day: 2                                   Subjective:  74 y.o. female POD 1 s/p right orbital decompression, external ethmoidectomy and lower eyelid retraction repair. Today she is feeling weak and unsteady on her feed and unable to ambulate without assistance. She feels the vision in the right eye is blurry and she has moderate pain with eye movement, especially with upgaze and abduction. She has not been needing scheduled pain medication.     Problem List:  Active Problems:    Post-op pain      Home Meds:  Medications Prior to Admission   Medication Sig Dispense Refill Last Dose   • brimonidine (ALPHAGAN) 0.2 % ophthalmic solution Administer 1 drop to both eyes 2 (Two) Times a Day.   2020   • Calcium-Magnesium-Vitamin D ER (CALCIUM 1200+D3) 600- MG-MG-UNIT tablet sustained-release 24 hour Take 1 tablet by mouth Every Morning. PT HOLDING FOR SURGERY   2020   • Coenzyme Q10 (COQ-10) 200 MG capsule Take 1 tablet/day by mouth Every Morning. PT HOLDING FOR SURGERY   1/3/2020   • denosumab (PROLIA) 60 MG/ML solution syringe Inject 60 mg under the skin into the appropriate area as directed Every 6 (Six) Months.   More than a month at Unknown time   • dorzolamide-timolol (COSOPT) 22.3-6.8 MG/ML ophthalmic solution Administer 1 drop to both eyes 2 (Two) Times a Day.  99 2020   • fluticasone (FLONASE) 50 MCG/ACT nasal spray 2 sprays into the nostril(s) as directed by provider Daily. (Patient taking differently: 2 sprays into the nostril(s) as directed by provider Every Evening.) 1 bottle 11 2020   • levothyroxine (SYNTHROID, LEVOTHROID) 75 MCG tablet Take 1 tablet by mouth Daily. (Patient taking differently: Take 75 mcg by mouth Every Morning.) 90 tablet 3 2020   • lisinopril (PRINIVIL,ZESTRIL) 10 MG tablet Take 1 tablet by mouth  Daily. (Patient taking differently: Take 10 mg by mouth Every Night.) 90 tablet 3 1/16/2020   • montelukast (SINGULAIR) 10 MG tablet Take 1 tablet by mouth Daily. (Patient taking differently: Take 10 mg by mouth Every Night.) 90 tablet 3 1/16/2020   • Multiple Vitamins-Minerals (MULTIVITAMIN ADULT PO) Take 1 tablet by mouth Every Morning. PT HOLDING FOR SURGERY   1/3/2020   • Netarsudil-Latanoprost (ROCKLATAN) 0.02-0.005 % solution Apply 1 drop to eye(s) as directed by provider Every Night.   1/16/2020   • Omega-3 Fatty Acids (FISH OIL) 1200 MG capsule delayed-release Take 1 capsule by mouth Every Morning. PT HOLDING FOR SURGERY   1/3/2020   • simvastatin (ZOCOR) 20 MG tablet Take 1 tablet by mouth every night at bedtime. 90 tablet 3 1/16/2020       Current Meds:     Current Facility-Administered Medications:   •  atorvastatin (LIPITOR) tablet 10 mg, 10 mg, Oral, Nightly, Jesse Morrison Jr., MD, 10 mg at 01/17/20 2141  •  brimonidine (ALPHAGAN) 0.2 % ophthalmic solution 1 drop, 1 drop, Both Eyes, BID, Jesse Morrison Jr., MD, 1 drop at 01/18/20 0902  •  dorzolamide (TRUSOPT) 2 % ophthalmic solution 1 drop, 1 drop, Both Eyes, BID, 1 drop at 01/18/20 0902 **AND** timolol (TIMOPTIC) 0.5 % ophthalmic solution 1 drop, 1 drop, Both Eyes, Q12H, Negrito Dennis MD, 1 drop at 01/18/20 0903  •  fluticasone (FLONASE) 50 MCG/ACT nasal spray 2 spray, 2 spray, Nasal, Daily, Jesse Morrison Jr., MD, 2 spray at 01/18/20 0902  •  HYDROmorphone (DILAUDID) injection 0.5 mg, 0.5 mg, Intravenous, Q2H PRN **AND** naloxone (NARCAN) injection 0.4 mg, 0.4 mg, Intravenous, Q5 Min PRN, Jesse Morrison Jr., MD  •  lactated ringers infusion, 9 mL/hr, Intravenous, Continuous, Martin Clifton MD, Last Rate: 9 mL/hr at 01/17/20 0634  •  levothyroxine (SYNTHROID, LEVOTHROID) tablet 75 mcg, 75 mcg, Oral, Q AM, Jesse Morrison Jr., MD, 75 mcg at 01/18/20 0653  •  lisinopril (PRINIVIL,ZESTRIL) tablet 10 mg,  10 mg, Oral, Daily, Jesse Morrison Jr., MD, 10 mg at 01/17/20 2141  •  montelukast (SINGULAIR) tablet 10 mg, 10 mg, Oral, Nightly, Jesse Morrison Jr., MD, 10 mg at 01/17/20 2142  •  morphine injection 1 mg, 1 mg, Intravenous, Q4H PRN **AND** naloxone (NARCAN) injection 0.4 mg, 0.4 mg, Intravenous, Q5 Min PRN, Jesse Morrison Jr., MD  •  oxyCODONE-acetaminophen (PERCOCET) 7.5-325 MG per tablet 1 tablet, 1 tablet, Oral, Q4H PRN, Martin Clifton MD  •  sodium chloride 0.9 % flush 10 mL, 10 mL, Intravenous, Q12H, Jesse Morrison Jr., MD, 10 mL at 01/18/20 0903  •  sodium chloride 0.9 % flush 10 mL, 10 mL, Intravenous, PRN, Jesse Morrison Jr., MD    Allergies:  Allergies   Allergen Reactions   • Sulfa Antibiotics Hives       Objective:  General Appearance: NAD    Exam:    VA cc near P T EOM    20/50-2 6->5mm no apd STP Limited due to pain    20/30-2 6->5mm no apd STP Limited due to pain      SLE/PLE    With 20D lens at bedside     OD OS   External/Lid Periorbital edema and ecchymosis wnl   Conj/sclera Injection with mild chemosis White/quiet   Cornea clear clear   Anterior Chamber formed formed   Iris Round/reactive Round/reactive                 Assessment/Recommendations:   POD 1 s/p right orbital decompression, external ethmoidectomy and lower eyelid retraction repair.  - She is healing as expected. Still weak and unsteady I do not feel she is ready to go home at this point as she lives alone and will need assistance with ambulation. Significant blood loss during surgery  - recommend ice to the right eye Q20min/hr while awake  - Erythromycin ophthalmic ointment BID to the right eye  - continue glaucoma drops and home medications as scheduled  - pain control PRN  - will request ambulatory pulse oximeter prior to discharge    Jesse Morrison Jr, MD  1/18/2020 10:47 AM

## 2020-01-19 VITALS
RESPIRATION RATE: 16 BRPM | DIASTOLIC BLOOD PRESSURE: 67 MMHG | TEMPERATURE: 97 F | OXYGEN SATURATION: 95 % | SYSTOLIC BLOOD PRESSURE: 119 MMHG | HEART RATE: 65 BPM

## 2020-01-19 PROCEDURE — A9270 NON-COVERED ITEM OR SERVICE: HCPCS | Performed by: OPHTHALMOLOGY

## 2020-01-19 PROCEDURE — 63710000001 LISINOPRIL 10 MG TABLET: Performed by: OPHTHALMOLOGY

## 2020-01-19 PROCEDURE — 63710000001 ERYTHROMYCIN 5 MG/GM OINTMENT 1 G TUBE: Performed by: OPHTHALMOLOGY

## 2020-01-19 PROCEDURE — 63710000001 LEVOTHYROXINE 75 MCG TABLET: Performed by: OPHTHALMOLOGY

## 2020-01-19 PROCEDURE — G0378 HOSPITAL OBSERVATION PER HR: HCPCS

## 2020-01-19 RX ADMIN — SODIUM CHLORIDE, PRESERVATIVE FREE 10 ML: 5 INJECTION INTRAVENOUS at 08:34

## 2020-01-19 RX ADMIN — TIMOLOL MALEATE 1 DROP: 5 SOLUTION OPHTHALMIC at 08:34

## 2020-01-19 RX ADMIN — LISINOPRIL 10 MG: 10 TABLET ORAL at 08:33

## 2020-01-19 RX ADMIN — FLUTICASONE PROPIONATE 2 SPRAY: 50 SPRAY, METERED NASAL at 08:34

## 2020-01-19 RX ADMIN — LEVOTHYROXINE SODIUM 75 MCG: 75 TABLET ORAL at 06:51

## 2020-01-19 RX ADMIN — ERYTHROMYCIN: 5 OINTMENT OPHTHALMIC at 06:52

## 2020-01-19 RX ADMIN — BRIMONIDINE TARTRATE 1 DROP: 2 SOLUTION OPHTHALMIC at 08:34

## 2020-01-19 RX ADMIN — DORZOLAMIDE HYDROCHLORIDE 1 DROP: 20 SOLUTION/ DROPS OPHTHALMIC at 08:34

## 2020-01-19 NOTE — NURSING NOTE
Walking oximetry:  amb around nurses station, room air o2 sats 95-98%.  No shortness of air, gait steady. yari well.

## 2020-01-19 NOTE — DISCHARGE SUMMARY
Discharge Summary    Name: Jasmine Banegas  Age: 74 y.o.  Sex: female  :  1945  MRN: [id number]               DATE OF ADMISSION: 2020    DATE OF DISCHARGE: 20     DISCHARGE DIAGNOSES:  Active Problems:    Post-op pain  Overview:  Resolved Problems:    * No resolved hospital problems. *      CONSULTANTS: None    PROCEDURES: None.    BRIEF HISTORY: Jasmine Banegas 74 y.o. POD 2 s/p right orbital decompression, external ethmoidectomy and lower eyelid retraction repair. Today she is feeling improved in regards to her weakness and pain. She feels the vision in the right eye is still blurry at times but overall improving. She has not been needing scheduled pain medication. she feels ready for discharge    DIAGNOSTIC STUDIES:   Imaging Results (Last 7 Days)     ** No results found for the last 168 hours. **          HOSPITAL COURSE:  1. Patient was observed for 2 nights after surgery. Her hospital course was uncomplicated. She was continued on her home medications and has not required much pain medication. She is stable for discharge home today.     DISCHARGE DISPOSITION: Discharged Home    ACTIVITY: Per prior to admission.    DIET: Per prior to admission.    MEDICATIONS:      Your medication list      START taking these medications      Instructions Last Dose Given Next Dose Due   erythromycin 5 MG/GM ophthalmic ointment  Commonly known as:  ROMYCIN      Administer  to the right eye 2 (Two) Times a Day for 7 days. Place on eyelid wounds and onto the eye       HYDROcodone-acetaminophen 7.5-325 MG per tablet  Commonly known as:  NORCO      Take 1 tablet by mouth Every 6 (Six) Hours As Needed for Moderate Pain  (pain).          CHANGE how you take these medications      Instructions Last Dose Given Next Dose Due   fluticasone 50 MCG/ACT nasal spray  Commonly known as:  FLONASE  What changed:  when to take this      2 sprays into the nostril(s) as directed by provider Daily.       levothyroxine 75 MCG  tablet  Commonly known as:  SYNTHROID, LEVOTHROID  What changed:  when to take this      Take 1 tablet by mouth Daily.       lisinopril 10 MG tablet  Commonly known as:  PRINIVIL,ZESTRIL  What changed:  when to take this      Take 1 tablet by mouth Daily.       montelukast 10 MG tablet  Commonly known as:  SINGULAIR  What changed:  when to take this      Take 1 tablet by mouth Daily.          CONTINUE taking these medications      Instructions Last Dose Given Next Dose Due   brimonidine 0.2 % ophthalmic solution  Commonly known as:  ALPHAGAN      Administer 1 drop to both eyes 2 (Two) Times a Day.       CALCIUM 1200+D3 600- MG-MG-UNIT tablet sustained-release 24 hour  Generic drug:  Calcium-Magnesium-Vitamin D ER      Take 1 tablet by mouth Every Morning. PT HOLDING FOR SURGERY       CoQ-10 200 MG capsule      Take 1 tablet/day by mouth Every Morning. PT HOLDING FOR SURGERY       denosumab 60 MG/ML solution syringe  Commonly known as:  PROLIA      Inject 60 mg under the skin into the appropriate area as directed Every 6 (Six) Months.       dorzolamide-timolol 22.3-6.8 MG/ML ophthalmic solution  Commonly known as:  COSOPT      Administer 1 drop to both eyes 2 (Two) Times a Day.       Fish Oil 1200 MG capsule delayed-release      Take 1 capsule by mouth Every Morning. PT HOLDING FOR SURGERY       MULTIVITAMIN ADULT PO      Take 1 tablet by mouth Every Morning. PT HOLDING FOR SURGERY       ROCKLATAN 0.02-0.005 % solution  Generic drug:  Netarsudil-Latanoprost      Apply 1 drop to eye(s) as directed by provider Every Night.       simvastatin 20 MG tablet  Commonly known as:  ZOCOR      Take 1 tablet by mouth every night at bedtime.             Where to Get Your Medications      These medications were sent to [facility] - [address] [address]    Hours:  7:00AM-6PM Mon-Fri Phone:  [phone]   · erythromycin 5 MG/GM ophthalmic ointment  · HYDROcodone-acetaminophen 7.5-325 MG per  tablet       Objective:  General Appearance: NAD     Exam:     VA cc near P T EOM     20/50-2 6->5mm no apd STP Limited due to pain     20/30-2 6->5mm no apd STP Limited due to pain        SLE/PLE     With 20D lens at bedside       OD OS   External/Lid Periorbital edema and ecchymosis wnl   Conj/sclera Injection with mild chemosis White/quiet   Cornea clear clear   Anterior Chamber formed formed   Iris Round/reactive Round/reactive                      Assessment/Recommendations:   POD 1 s/p right orbital decompression, external ethmoidectomy and lower eyelid retraction repair.  - She is healing as expected.   - recommend ice to the right eye Q20min/hr while awake  - Erythromycin ophthalmic ointment BID to the right eye  - continue glaucoma drops and home medications as scheduled  - pain control PRN    FOLLOWUP: follow up for first post operative visit, call [phone] to schedule appointment time/date.    Jesse Morrison Jr, MD  1/19/2020 10:10 AM

## 2020-01-19 NOTE — PROGRESS NOTES
Discharge Planning Assessment  [facility]     Patient Name: Jasmine Banegas  MRN: [id number]  Today's Date: 1/19/2020    Admit Date: 1/17/2020    Discharge Plan     Row Name 01/19/20 1521       Plan    Plan Comments  Pt discharged, no discharge needs identified.    Final Discharge Disposition Code  01 - home or self-care     Expected Discharge Date and Time     Expected Discharge Date Expected Discharge Time    Jan 19, 2020      Breanna Galeano RN

## 2020-01-19 NOTE — PLAN OF CARE
Right eye remains swollen, sclera red, swollen.  Drainage serous sanginous from right eye. No c/o pain. Ice to eye as ordered. Vss.

## 2020-03-05 ENCOUNTER — APPOINTMENT (OUTPATIENT)
Dept: PREADMISSION TESTING | Facility: HOSPITAL | Age: 75
End: 2020-03-05

## 2020-03-05 VITALS
WEIGHT: 104.56 LBS | HEIGHT: 62 IN | BODY MASS INDEX: 19.24 KG/M2 | TEMPERATURE: 98.1 F | RESPIRATION RATE: 18 BRPM | DIASTOLIC BLOOD PRESSURE: 72 MMHG | SYSTOLIC BLOOD PRESSURE: 126 MMHG | HEART RATE: 64 BPM | OXYGEN SATURATION: 97 %

## 2020-03-05 LAB
ANION GAP SERPL CALCULATED.3IONS-SCNC: 11.5 MMOL/L (ref 5–15)
BUN BLD-MCNC: 13 MG/DL (ref 8–23)
BUN/CREAT SERPL: 21.3 (ref 7–25)
CALCIUM SPEC-SCNC: 9.1 MG/DL (ref 8.6–10.5)
CHLORIDE SERPL-SCNC: 103 MMOL/L (ref 98–107)
CO2 SERPL-SCNC: 25.5 MMOL/L (ref 22–29)
CREAT BLD-MCNC: 0.61 MG/DL (ref 0.57–1)
DEPRECATED RDW RBC AUTO: 44.8 FL (ref 37–54)
ERYTHROCYTE [DISTWIDTH] IN BLOOD BY AUTOMATED COUNT: 13.3 % (ref 12.3–15.4)
GFR SERPL CREATININE-BSD FRML MDRD: 96 ML/MIN/1.73
GLUCOSE BLD-MCNC: 85 MG/DL (ref 65–99)
HCT VFR BLD AUTO: 38.5 % (ref 34–46.6)
HGB BLD-MCNC: 12.7 G/DL (ref 12–15.9)
MCH RBC QN AUTO: 30.1 PG (ref 26.6–33)
MCHC RBC AUTO-ENTMCNC: 33 G/DL (ref 31.5–35.7)
MCV RBC AUTO: 91.2 FL (ref 79–97)
PLATELET # BLD AUTO: 188 10*3/MM3 (ref 140–450)
PMV BLD AUTO: 11.1 FL (ref 6–12)
POTASSIUM BLD-SCNC: 3.9 MMOL/L (ref 3.5–5.2)
RBC # BLD AUTO: 4.22 10*6/MM3 (ref 3.77–5.28)
SODIUM BLD-SCNC: 140 MMOL/L (ref 136–145)
WBC NRBC COR # BLD: 6.4 10*3/MM3 (ref 3.4–10.8)

## 2020-03-05 PROCEDURE — 85027 COMPLETE CBC AUTOMATED: CPT | Performed by: OPHTHALMOLOGY

## 2020-03-05 PROCEDURE — 80048 BASIC METABOLIC PNL TOTAL CA: CPT | Performed by: OPHTHALMOLOGY

## 2020-03-05 PROCEDURE — 36415 COLL VENOUS BLD VENIPUNCTURE: CPT

## 2020-03-05 RX ORDER — LEVOTHYROXINE SODIUM 0.07 MG/1
37.5 TABLET ORAL DAILY
COMMUNITY
End: 2020-07-14

## 2020-03-05 NOTE — DISCHARGE INSTRUCTIONS
ARRIVE DAY OF SURGERY AT 5:45 AM TO [facility]        Take the following medications the morning of surgery:    LEVOTHYROXINE    General Instructions:  • Do not eat solid food after midnight the night before surgery.  • You may drink clear liquids day of surgery but must stop at least one hour before your hospital arrival time.  • It is beneficial for you to have a clear drink that contains carbohydrates the day of surgery.  We suggest a 12 to 20 ounce bottle of Gatorade or Powerade for non-diabetic patients or a 12 to 20 ounce bottle of G2 or Powerade Zero for diabetic patients. (Pediatric patients, are not advised to drink a 12 to 20 ounce carbohydrate drink)    Clear liquids are liquids you can see through.  Nothing red in color.     Plain water                               Sports drinks  Sodas                                   Gelatin (Jell-O)  Fruit juices without pulp such as white grape juice and apple juice  Popsicles that contain no fruit or yogurt  Tea or coffee (no cream or milk added)  Gatorade / Powerade  G2 / Powerade Zero    • Infants may have breast milk up to four hours before surgery.  • Infants drinking formula may drink formula up to six hours before surgery.   • Patients who avoid smoking, chewing tobacco and alcohol for 4 weeks prior to surgery have a reduced risk of post-operative complications.  Quit smoking as many days before surgery as you can.  • Do not smoke, use chewing tobacco or drink alcohol the day of surgery.   • If applicable bring your C-PAP/ BI-PAP machine.  • Bring any papers given to you in the doctor’s office.  • Wear clean comfortable clothes.  • Do not wear contact lenses, false eyelashes or make-up.  Bring a case for your glasses.   • Bring crutches or walker if applicable.  • Remove all piercings.  Leave jewelry and any other valuables at home.  • Hair extensions with metal clips must be removed prior to surgery.  • The Pre-Admission Testing nurse will instruct you  to bring medications if unable to obtain an accurate list in Pre-Admission Testing.            Preventing a Surgical Site Infection:  • For 2 to 3 days before surgery, avoid shaving with a razor because the razor can irritate skin and make it easier to develop an infection.    • Any areas of open skin can increase the risk of a post-operative wound infection by allowing bacteria to enter and travel throughout the body.  Notify your surgeon if you have any skin wounds / rashes even if it is not near the expected surgical site.  The area will need assessed to determine if surgery should be delayed until it is healed.  • The night prior to surgery shower using a fresh bar of anti-bacterial soap (such as Dial) and clean washcloth.  Sleep in a clean bed with clean clothing.  Do not allow pets to sleep with you.  • Shower on the morning of surgery using a fresh bar of anti-bacterial soap (such as Dial) and clean washcloth.  Dry with a clean towel and dress in clean clothing.  • Ask your surgeon if you will be receiving antibiotics prior to surgery.  • Make sure you, your family, and all healthcare providers clean their hands with soap and water or an alcohol based hand  before caring for you or your wound.    Day of surgery:  Your arrival time is approximately two hours before your scheduled surgery time.  Upon arrival, a Pre-op nurse and Anesthesiologist will review your health history, obtain vital signs, and answer questions you may have.  The only belongings needed at this time will be a list of your home medications and if applicable your C-PAP/BI-PAP machine.  If you are staying overnight your family can leave the rest of your belongings in the car and bring them to your room later.  A Pre-op nurse will start an IV and you may receive medication in preparation for surgery, including something to help you relax.  Your family will be able to see you in the Pre-op area.  Two visitors at a time will be allowed  in the Pre-op room.  While you are in surgery your family should notify the waiting room  if they leave the waiting room area and provide a contact phone number.    Please be aware that surgery does come with discomfort.  We want to make every effort to control your discomfort so please discuss any uncontrolled symptoms with your nurse.   Your doctor will most likely have prescribed pain medications.      If you are going home after surgery you will receive individualized written care instructions before being discharged.  A responsible adult must drive you to and from the hospital on the day of your surgery and stay with you for 24 hours.    If you are staying overnight following surgery, you will be transported to your hospital room following the recovery period.  [facility] has all private rooms.    If you have any questions please call Pre-Admission Testing at [phone].  Deductibles and co-payments are collected on the day of service. Please be prepared to pay the required co-pay, deductible or deposit on the day of service as defined by your plan.

## 2020-03-12 ENCOUNTER — HOSPITAL ENCOUNTER (OUTPATIENT)
Facility: HOSPITAL | Age: 75
Setting detail: HOSPITAL OUTPATIENT SURGERY
Discharge: HOME | End: 2020-03-12
Attending: OPHTHALMOLOGY | Admitting: OPHTHALMOLOGY

## 2020-03-12 ENCOUNTER — ANESTHESIA (OUTPATIENT)
Dept: PERIOP | Facility: HOSPITAL | Age: 75
End: 2020-03-12

## 2020-03-12 ENCOUNTER — ANESTHESIA EVENT (OUTPATIENT)
Dept: PERIOP | Facility: HOSPITAL | Age: 75
End: 2020-03-12

## 2020-03-12 VITALS
HEART RATE: 69 BPM | SYSTOLIC BLOOD PRESSURE: 163 MMHG | DIASTOLIC BLOOD PRESSURE: 81 MMHG | RESPIRATION RATE: 18 BRPM | TEMPERATURE: 97.8 F | OXYGEN SATURATION: 93 %

## 2020-03-12 PROCEDURE — 25010000002 PHENYLEPHRINE PER 1 ML: Performed by: NURSE ANESTHETIST, CERTIFIED REGISTERED

## 2020-03-12 PROCEDURE — 25010000002 DEXAMETHASONE PER 1 MG: Performed by: NURSE ANESTHETIST, CERTIFIED REGISTERED

## 2020-03-12 PROCEDURE — 25010000002 MIDAZOLAM PER 1 MG: Performed by: ANESTHESIOLOGY

## 2020-03-12 PROCEDURE — 25010000002 FENTANYL CITRATE (PF) 100 MCG/2ML SOLUTION: Performed by: NURSE ANESTHETIST, CERTIFIED REGISTERED

## 2020-03-12 PROCEDURE — 25010000002 NEOSTIGMINE PER 0.5 MG: Performed by: NURSE ANESTHETIST, CERTIFIED REGISTERED

## 2020-03-12 PROCEDURE — 25010000002 FENTANYL CITRATE (PF) 100 MCG/2ML SOLUTION: Performed by: ANESTHESIOLOGY

## 2020-03-12 PROCEDURE — 25010000002 PROPOFOL 10 MG/ML EMULSION: Performed by: NURSE ANESTHETIST, CERTIFIED REGISTERED

## 2020-03-12 PROCEDURE — 25010000002 ONDANSETRON PER 1 MG: Performed by: NURSE ANESTHETIST, CERTIFIED REGISTERED

## 2020-03-12 DEVICE — SCLERA SHLL WHL: Type: IMPLANTABLE DEVICE | Site: EYELID | Status: FUNCTIONAL

## 2020-03-12 RX ORDER — HYDRALAZINE HYDROCHLORIDE 20 MG/ML
5 INJECTION INTRAMUSCULAR; INTRAVENOUS
Status: DISCONTINUED | OUTPATIENT
Start: 2020-03-12 | End: 2020-03-12 | Stop reason: HOSPADM

## 2020-03-12 RX ORDER — FENTANYL CITRATE 50 UG/ML
25 INJECTION, SOLUTION INTRAMUSCULAR; INTRAVENOUS
Status: DISCONTINUED | OUTPATIENT
Start: 2020-03-12 | End: 2020-03-12 | Stop reason: HOSPADM

## 2020-03-12 RX ORDER — PROMETHAZINE HYDROCHLORIDE 25 MG/1
25 SUPPOSITORY RECTAL ONCE AS NEEDED
Status: DISCONTINUED | OUTPATIENT
Start: 2020-03-12 | End: 2020-03-12 | Stop reason: HOSPADM

## 2020-03-12 RX ORDER — HYDROCODONE BITARTRATE AND ACETAMINOPHEN 7.5; 325 MG/1; MG/1
1 TABLET ORAL EVERY 6 HOURS PRN
Qty: 15 TABLET | Refills: 0 | Status: SHIPPED | OUTPATIENT
Start: 2020-03-12 | End: 2020-07-14

## 2020-03-12 RX ORDER — SODIUM CHLORIDE 0.9 % (FLUSH) 0.9 %
10 SYRINGE (ML) INJECTION EVERY 12 HOURS SCHEDULED
Status: DISCONTINUED | OUTPATIENT
Start: 2020-03-12 | End: 2020-03-12 | Stop reason: HOSPADM

## 2020-03-12 RX ORDER — ACETAMINOPHEN 650 MG/1
650 SUPPOSITORY RECTAL ONCE AS NEEDED
Status: DISCONTINUED | OUTPATIENT
Start: 2020-03-12 | End: 2020-03-12 | Stop reason: HOSPADM

## 2020-03-12 RX ORDER — MAGNESIUM HYDROXIDE 1200 MG/15ML
LIQUID ORAL AS NEEDED
Status: DISCONTINUED | OUTPATIENT
Start: 2020-03-12 | End: 2020-03-12 | Stop reason: HOSPADM

## 2020-03-12 RX ORDER — FENTANYL CITRATE 50 UG/ML
INJECTION, SOLUTION INTRAMUSCULAR; INTRAVENOUS AS NEEDED
Status: DISCONTINUED | OUTPATIENT
Start: 2020-03-12 | End: 2020-03-12 | Stop reason: SURG

## 2020-03-12 RX ORDER — NALOXONE HCL 0.4 MG/ML
0.2 VIAL (ML) INJECTION AS NEEDED
Status: DISCONTINUED | OUTPATIENT
Start: 2020-03-12 | End: 2020-03-12 | Stop reason: HOSPADM

## 2020-03-12 RX ORDER — PROPOFOL 10 MG/ML
VIAL (ML) INTRAVENOUS AS NEEDED
Status: DISCONTINUED | OUTPATIENT
Start: 2020-03-12 | End: 2020-03-12 | Stop reason: SURG

## 2020-03-12 RX ORDER — SCOLOPAMINE TRANSDERMAL SYSTEM 1 MG/1
1 PATCH, EXTENDED RELEASE TRANSDERMAL CONTINUOUS
Status: DISCONTINUED | OUTPATIENT
Start: 2020-03-12 | End: 2020-03-12 | Stop reason: HOSPADM

## 2020-03-12 RX ORDER — NALBUPHINE HCL 10 MG/ML
10 AMPUL (ML) INJECTION EVERY 4 HOURS PRN
Status: DISCONTINUED | OUTPATIENT
Start: 2020-03-12 | End: 2020-03-12 | Stop reason: HOSPADM

## 2020-03-12 RX ORDER — GLYCOPYRROLATE 0.2 MG/ML
INJECTION INTRAMUSCULAR; INTRAVENOUS AS NEEDED
Status: DISCONTINUED | OUTPATIENT
Start: 2020-03-12 | End: 2020-03-12 | Stop reason: SURG

## 2020-03-12 RX ORDER — HYDROMORPHONE HYDROCHLORIDE 1 MG/ML
0.5 INJECTION, SOLUTION INTRAMUSCULAR; INTRAVENOUS; SUBCUTANEOUS
Status: DISCONTINUED | OUTPATIENT
Start: 2020-03-12 | End: 2020-03-12 | Stop reason: HOSPADM

## 2020-03-12 RX ORDER — HYDROCODONE BITARTRATE AND ACETAMINOPHEN 5; 325 MG/1; MG/1
1 TABLET ORAL ONCE AS NEEDED
Status: COMPLETED | OUTPATIENT
Start: 2020-03-12 | End: 2020-03-12

## 2020-03-12 RX ORDER — ERYTHROMYCIN 5 MG/G
OINTMENT OPHTHALMIC AS NEEDED
Status: DISCONTINUED | OUTPATIENT
Start: 2020-03-12 | End: 2020-03-12 | Stop reason: HOSPADM

## 2020-03-12 RX ORDER — ACETAMINOPHEN 500 MG
500 TABLET ORAL ONCE
Status: COMPLETED | OUTPATIENT
Start: 2020-03-12 | End: 2020-03-12

## 2020-03-12 RX ORDER — PROMETHAZINE HYDROCHLORIDE 25 MG/ML
6.25 INJECTION, SOLUTION INTRAMUSCULAR; INTRAVENOUS ONCE AS NEEDED
Status: DISCONTINUED | OUTPATIENT
Start: 2020-03-12 | End: 2020-03-12 | Stop reason: HOSPADM

## 2020-03-12 RX ORDER — HYDROCODONE BITARTRATE AND ACETAMINOPHEN 7.5; 325 MG/1; MG/1
1 TABLET ORAL ONCE AS NEEDED
Status: DISCONTINUED | OUTPATIENT
Start: 2020-03-12 | End: 2020-03-12 | Stop reason: HOSPADM

## 2020-03-12 RX ORDER — NALBUPHINE HCL 10 MG/ML
2 AMPUL (ML) INJECTION EVERY 4 HOURS PRN
Status: DISCONTINUED | OUTPATIENT
Start: 2020-03-12 | End: 2020-03-12 | Stop reason: HOSPADM

## 2020-03-12 RX ORDER — NALOXONE HCL 0.4 MG/ML
0.4 VIAL (ML) INJECTION AS NEEDED
Status: DISCONTINUED | OUTPATIENT
Start: 2020-03-12 | End: 2020-03-12 | Stop reason: HOSPADM

## 2020-03-12 RX ORDER — ROCURONIUM BROMIDE 10 MG/ML
INJECTION, SOLUTION INTRAVENOUS AS NEEDED
Status: DISCONTINUED | OUTPATIENT
Start: 2020-03-12 | End: 2020-03-12 | Stop reason: SURG

## 2020-03-12 RX ORDER — MIDAZOLAM HYDROCHLORIDE 1 MG/ML
2 INJECTION INTRAMUSCULAR; INTRAVENOUS
Status: DISCONTINUED | OUTPATIENT
Start: 2020-03-12 | End: 2020-03-12 | Stop reason: HOSPADM

## 2020-03-12 RX ORDER — SODIUM CHLORIDE 0.9 % (FLUSH) 0.9 %
10 SYRINGE (ML) INJECTION AS NEEDED
Status: DISCONTINUED | OUTPATIENT
Start: 2020-03-12 | End: 2020-03-12 | Stop reason: HOSPADM

## 2020-03-12 RX ORDER — SODIUM CHLORIDE, SODIUM LACTATE, POTASSIUM CHLORIDE, CALCIUM CHLORIDE 600; 310; 30; 20 MG/100ML; MG/100ML; MG/100ML; MG/100ML
9 INJECTION, SOLUTION INTRAVENOUS CONTINUOUS
Status: DISCONTINUED | OUTPATIENT
Start: 2020-03-12 | End: 2020-03-12 | Stop reason: HOSPADM

## 2020-03-12 RX ORDER — ACETAMINOPHEN 325 MG/1
650 TABLET ORAL ONCE AS NEEDED
Status: DISCONTINUED | OUTPATIENT
Start: 2020-03-12 | End: 2020-03-12 | Stop reason: HOSPADM

## 2020-03-12 RX ORDER — ONDANSETRON 2 MG/ML
4 INJECTION INTRAMUSCULAR; INTRAVENOUS ONCE AS NEEDED
Status: DISCONTINUED | OUTPATIENT
Start: 2020-03-12 | End: 2020-03-12 | Stop reason: HOSPADM

## 2020-03-12 RX ORDER — MIDAZOLAM HYDROCHLORIDE 1 MG/ML
1 INJECTION INTRAMUSCULAR; INTRAVENOUS
Status: DISCONTINUED | OUTPATIENT
Start: 2020-03-12 | End: 2020-03-12 | Stop reason: HOSPADM

## 2020-03-12 RX ORDER — ERYTHROMYCIN 5 MG/G
OINTMENT OPHTHALMIC 2 TIMES DAILY
Qty: 3.5 G | Refills: 2 | Status: SHIPPED | OUTPATIENT
Start: 2020-03-12 | End: 2020-07-14

## 2020-03-12 RX ORDER — DIPHENHYDRAMINE HYDROCHLORIDE 50 MG/ML
12.5 INJECTION INTRAMUSCULAR; INTRAVENOUS
Status: DISCONTINUED | OUTPATIENT
Start: 2020-03-12 | End: 2020-03-12 | Stop reason: HOSPADM

## 2020-03-12 RX ORDER — FENTANYL CITRATE 50 UG/ML
50 INJECTION, SOLUTION INTRAMUSCULAR; INTRAVENOUS
Status: DISCONTINUED | OUTPATIENT
Start: 2020-03-12 | End: 2020-03-12 | Stop reason: HOSPADM

## 2020-03-12 RX ORDER — LIDOCAINE HYDROCHLORIDE 20 MG/ML
INJECTION, SOLUTION INFILTRATION; PERINEURAL AS NEEDED
Status: DISCONTINUED | OUTPATIENT
Start: 2020-03-12 | End: 2020-03-12 | Stop reason: SURG

## 2020-03-12 RX ORDER — FLUMAZENIL 0.1 MG/ML
0.2 INJECTION INTRAVENOUS AS NEEDED
Status: DISCONTINUED | OUTPATIENT
Start: 2020-03-12 | End: 2020-03-12 | Stop reason: HOSPADM

## 2020-03-12 RX ORDER — LIDOCAINE HYDROCHLORIDE 10 MG/ML
0.5 INJECTION, SOLUTION EPIDURAL; INFILTRATION; INTRACAUDAL; PERINEURAL ONCE AS NEEDED
Status: DISCONTINUED | OUTPATIENT
Start: 2020-03-12 | End: 2020-03-12 | Stop reason: HOSPADM

## 2020-03-12 RX ORDER — DEXAMETHASONE SODIUM PHOSPHATE 10 MG/ML
INJECTION INTRAMUSCULAR; INTRAVENOUS AS NEEDED
Status: DISCONTINUED | OUTPATIENT
Start: 2020-03-12 | End: 2020-03-12 | Stop reason: SURG

## 2020-03-12 RX ORDER — ONDANSETRON 2 MG/ML
INJECTION INTRAMUSCULAR; INTRAVENOUS AS NEEDED
Status: DISCONTINUED | OUTPATIENT
Start: 2020-03-12 | End: 2020-03-12 | Stop reason: SURG

## 2020-03-12 RX ORDER — ECHINACEA PURPUREA EXTRACT 125 MG
TABLET ORAL AS NEEDED
Status: DISCONTINUED | OUTPATIENT
Start: 2020-03-12 | End: 2020-03-12 | Stop reason: HOSPADM

## 2020-03-12 RX ORDER — PROMETHAZINE HYDROCHLORIDE 25 MG/1
25 TABLET ORAL ONCE AS NEEDED
Status: DISCONTINUED | OUTPATIENT
Start: 2020-03-12 | End: 2020-03-12 | Stop reason: HOSPADM

## 2020-03-12 RX ADMIN — Medication 3 MG: at 08:56

## 2020-03-12 RX ADMIN — GLYCOPYRROLATE 0.2 MG: 0.2 INJECTION INTRAMUSCULAR; INTRAVENOUS at 08:31

## 2020-03-12 RX ADMIN — ONDANSETRON HYDROCHLORIDE 4 MG: 2 SOLUTION INTRAMUSCULAR; INTRAVENOUS at 08:48

## 2020-03-12 RX ADMIN — PROPOFOL 75 MCG/KG/MIN: 10 INJECTION, EMULSION INTRAVENOUS at 08:02

## 2020-03-12 RX ADMIN — MIDAZOLAM 1 MG: 1 INJECTION INTRAMUSCULAR; INTRAVENOUS at 06:57

## 2020-03-12 RX ADMIN — HYDROCODONE BITARTRATE AND ACETAMINOPHEN 1 TABLET: 5; 325 TABLET ORAL at 09:34

## 2020-03-12 RX ADMIN — PROPOFOL 120 MG: 10 INJECTION, EMULSION INTRAVENOUS at 07:53

## 2020-03-12 RX ADMIN — FENTANYL CITRATE 25 MCG: 50 INJECTION INTRAMUSCULAR; INTRAVENOUS at 07:53

## 2020-03-12 RX ADMIN — FENTANYL CITRATE 25 MCG: 50 INJECTION INTRAMUSCULAR; INTRAVENOUS at 09:34

## 2020-03-12 RX ADMIN — ROCURONIUM BROMIDE 25 MG: 10 INJECTION, SOLUTION INTRAVENOUS at 07:53

## 2020-03-12 RX ADMIN — FENTANYL CITRATE 25 MCG: 50 INJECTION INTRAMUSCULAR; INTRAVENOUS at 09:46

## 2020-03-12 RX ADMIN — LIDOCAINE HYDROCHLORIDE 100 MG: 20 INJECTION, SOLUTION INFILTRATION; PERINEURAL at 07:53

## 2020-03-12 RX ADMIN — DEXAMETHASONE SODIUM PHOSPHATE 8 MG: 10 INJECTION INTRAMUSCULAR; INTRAVENOUS at 08:11

## 2020-03-12 RX ADMIN — FENTANYL CITRATE 25 MCG: 50 INJECTION INTRAMUSCULAR; INTRAVENOUS at 08:07

## 2020-03-12 RX ADMIN — SODIUM CHLORIDE, POTASSIUM CHLORIDE, SODIUM LACTATE AND CALCIUM CHLORIDE 9 ML/HR: 600; 310; 30; 20 INJECTION, SOLUTION INTRAVENOUS at 06:37

## 2020-03-12 RX ADMIN — PHENYLEPHRINE HYDROCHLORIDE 100 MCG: 10 INJECTION INTRAVENOUS at 08:46

## 2020-03-12 RX ADMIN — FENTANYL CITRATE 25 MCG: 50 INJECTION INTRAMUSCULAR; INTRAVENOUS at 09:44

## 2020-03-12 RX ADMIN — PROPOFOL 50 MG: 10 INJECTION, EMULSION INTRAVENOUS at 07:55

## 2020-03-12 RX ADMIN — ACETAMINOPHEN 500 MG: 500 TABLET, FILM COATED ORAL at 06:46

## 2020-03-12 RX ADMIN — GLYCOPYRROLATE 0.4 MG: 0.2 INJECTION INTRAMUSCULAR; INTRAVENOUS at 08:56

## 2020-03-12 RX ADMIN — SCOPALAMINE 1 PATCH: 1 PATCH, EXTENDED RELEASE TRANSDERMAL at 06:47

## 2020-03-12 RX ADMIN — SUGAMMADEX 100 MG: 100 INJECTION, SOLUTION INTRAVENOUS at 09:09

## 2020-03-12 NOTE — ANESTHESIA PREPROCEDURE EVALUATION
Anesthesia Evaluation     history of anesthetic complications: PONV  NPO Solid Status: > 8 hours             Airway   Mallampati: II  TM distance: >3 FB  Neck ROM: full  No difficulty expected  Comment: Grade 1 view last surgery  Dental - normal exam     Pulmonary - normal exam   Cardiovascular     ECG reviewed  Rhythm: regular    (+) hypertension, hyperlipidemia,       Neuro/Psych  GI/Hepatic/Renal/Endo - negative ROS     Musculoskeletal     Abdominal    Substance History      OB/GYN          Other        ROS/Med Hx Other: osteogenisis imperfecta                  Anesthesia Plan    ASA 3     general   (  D/W R&B of GA including but not limited to: heart, lung, liver, kidney, neurologic problems, positioning injuries, dental damage, corneal abrasion and TMJ.  .    Plan subhypnotic propofol for PONV)  intravenous induction

## 2020-03-12 NOTE — ANESTHESIA PROCEDURE NOTES
Airway  Urgency: elective    Date/Time: 3/12/2020 7:58 AM  Airway not difficult    General Information and Staff    Patient location during procedure: OR  Anesthesiologist: Srinath Marley MD  CRNA: Yolette Alas CRNA    Indications and Patient Condition  Indications for airway management: airway protection    Preoxygenated: yes (pt pre-O2 with 100% O2)  Mask difficulty assessment: 2 - vent by mask + OA or adjuvant +/- NMBA (easy BMV )    Final Airway Details  Final airway type: endotracheal airway      Successful airway: ETT  Cuffed: yes   Successful intubation technique: direct laryngoscopy  Endotracheal tube insertion site: oral  Blade: Silvestre  Blade size: 3  ETT size (mm): 7.0  Cormack-Lehane Classification: grade I - full view of glottis  Placement verified by: chest auscultation and capnometry   Cuff volume (mL): 7  Measured from: lips  ETT/EBT  to lips (cm): 20  Number of attempts at approach: 1  Assessment: lips, teeth, and gum same as pre-op and atraumatic intubation    Additional Comments  ATOETx1. No change in dentition.

## 2020-03-12 NOTE — H&P
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     History of Present Illness: 74 y.o. female presents with above. No new meds/health problems since office visit      Allergies:   Allergies   Allergen Reactions   • Sulfa Antibiotics Hives       10 point review of systems negative, except pertaining to the HPI    Medications:   Home Medications:  No current facility-administered medications on file prior to encounter.      Current Outpatient Medications on File Prior to Encounter   Medication Sig   • brimonidine (ALPHAGAN) 0.2 % ophthalmic solution Administer 1 drop to both eyes 2 (Two) Times a Day.   • Calcium-Magnesium-Vitamin D ER (CALCIUM 1200+D3) 600- MG-MG-UNIT tablet sustained-release 24 hour Take 1 tablet by mouth Every Morning.   • dorzolamide-timolol (COSOPT) 22.3-6.8 MG/ML ophthalmic solution Administer 1 drop to both eyes 2 (Two) Times a Day.   • fluticasone (FLONASE) 50 MCG/ACT nasal spray 2 sprays into the nostril(s) as directed by provider Daily. (Patient taking differently: 2 sprays into the nostril(s) as directed by provider Every Evening.)   • levothyroxine (SYNTHROID, LEVOTHROID) 75 MCG tablet Take 1 tablet by mouth Daily. (Patient taking differently: Take 75 mcg by mouth Every Morning.)   • lisinopril (PRINIVIL,ZESTRIL) 10 MG tablet Take 1 tablet by mouth Daily. (Patient taking differently: Take 10 mg by mouth Every Night.)   • montelukast (SINGULAIR) 10 MG tablet Take 1 tablet by mouth Daily. (Patient taking differently: Take 10 mg by mouth Every Night.)   • Netarsudil-Latanoprost (ROCKLATAN) 0.02-0.005 % solution Administer 1 drop into the left eye Every Night.   • simvastatin (ZOCOR) 20 MG tablet Take 1 tablet by mouth every night at bedtime.   • denosumab  (PROLIA) 60 MG/ML solution syringe Inject 60 mg under the skin into the appropriate area as directed Every 6 (Six) Months.   • HYDROcodone-acetaminophen (NORCO) 7.5-325 MG per tablet Take 1 tablet by mouth Every 6 (Six) Hours As Needed for Moderate Pain  (pain).   • Multiple Vitamins-Minerals (MULTIVITAMIN ADULT PO) Take 1 tablet by mouth Every Morning. PT HOLDING FOR SURGERY   • Omega-3 Fatty Acids (FISH OIL) 1200 MG capsule delayed-release Take 1 capsule by mouth Every Morning. PT HOLDING FOR SURGERY     Current Medications:  Scheduled Meds:  sodium chloride 10 mL Intravenous Q12H     Continuous Infusions:  lactated ringers 9 mL/hr Last Rate: 9 mL/hr (03/12/20 0637)   Scopolamine 1 patch      PRN Meds:.•  acetaminophen **OR** acetaminophen  •  diphenhydrAMINE  •  fentanyl  •  hydrALAZINE  •  HYDROcodone-acetaminophen  •  lidocaine PF 1%  •  metoprolol tartrate  •  midazolam **OR** midazolam  •  nalbuphine **OR** nalbuphine  •  naloxone  •  promethazine **OR** promethazine **OR** promethazine **OR** promethazine  •  sodium chloride    Past Medical History:   Diagnosis Date   • Brittle bones    • Glaucoma    • Graves disease    • History of transfusion    • Hyperlipidemia    • Hypertension    • Osteoporosis    • Overactive bladder    • PONV (postoperative nausea and vomiting)     SEVERE   • Slow to wake up after anesthesia         Past Surgical History:   Procedure Laterality Date   • COLONOSCOPY     • ECTROPION REPAIR Right 1/17/2020    Procedure: RIGHT EXTERNAL ETHMOIDECTOMY, RIGHT LOWER LID RETRACTION REPAIR WITH EYE BANK SCLERA;  Surgeon: Negrito Dennis MD;  Location: [facility] OR [facility];  Service: Ophthalmology   • FEMUR FRACTURE SURGERY Left    • HIP FRACTURE SURGERY Bilateral     pin & plates in left hip; pin & plate removed right   • ORBITAL DECOMPRESSION Right 1/17/2020    Procedure: RIGHT ORBITAL DECOMPRESSION;  Surgeon: Negrito Dennis MD;  Location: [facility] OR [facility];  Service: Ophthalmology   •  TOTAL THYROIDECTOMY          Social History     Occupational History   • Not on file   Tobacco Use   • Smoking status: Never Smoker   • Smokeless tobacco: Never Used   Substance and Sexual Activity   • Alcohol use: Yes     Comment: rare   • Drug use: Never   • Sexual activity: Defer    Social History     Social History Narrative   • Not on file        Family History   Problem Relation Age of Onset   • Hypertension Mother    • Heart disease Father    • Alcohol abuse Brother    • Malig Hyperthermia Neg Hx            Physical Exam   Constitutional: Alert, cooperative, in no acute distress    Head: Normocephalic.   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   Cardiovascular: Normal rate.    Pulmonary/Chest: Effort normal.   Neurological: Alert.   Skin: Skin is warm.   Psychiatric: Normal mood and affect.       Assessment/Plan:  The patient voiced understanding of the risks, benefits, and alternative forms of treatment that were discussed and the patient consents to proceed with LEFT ORBITAL DECOMPRESSION, LEFT EXTERNAL ETHMOIDECTOMY, LEFT LOWER LID RETRACTION REPAIR WITH EYE BANK SCLERA.       Jesse Morrison Jr, MD

## 2020-03-12 NOTE — OP NOTE
OPERATIVE NOTE    Patient Identification:  Name: Jasmine Banegas  Age: 74 y.o.  Sex: female  :  1945  MRN: [id number]                                               Preoperative diagnosis: Graves with proptosis and left lower eyelid retraction  Postoperative diagnosis: same  Procedure: 1.  Left orbitotomy for decompression 2.  External ethmoidectomy 3.  Left lower eyelid retraction repair with eyebank sclera  Surgeon: Dr. Negrito Dennis who was present and scrubbed throughout all critical portions of the operation  Assistants: Jesse Morrison Jr, MD, Tam Dumont MS2  Anesthesia: General  EBL: less than 50cc  Specimens:  * No orders in the log *    Description of the procedure: The patient was taken to the operating room and placed on the table in the supine position, where anesthesia was induced. 2% lidocaine with epinephrine and 0.5% marcaine in a 1:1 fashion was injected over the surgical site, and the patient was prepped and draped in the usual manner for orbitofacial surgery.     Corneal protectors were placed in both eyes.     A 15 Bard-Jesus blade incision was made at the left lateral canthus. Sharp dissection was carried down to the lateral orbital rim periosteum. The inferior ramus of the lateral canthal tendon was identified and severed with sharp dissection. The lower lid was everted, and a combination of a 15 Bard-Jesus blade incision and monopolar cautery incision was made through the inferior fornix, down to the inferior orbital rim periosteum. The periosteum was opened with sharp dissection. The orbital floor periorbita was elevated with blunt dissection. Osteotomes and Kerrison rongeurs were used to remove the entire orbital floor, taking care to preserve the infraorbital neurovascular bundle. The maxillary sinus mucosa was opened with electrocauterization. The orbital floor periosteum was opened with sharp dissection. Gentle pressure was applied to the globe to encourage  herniation of orbital contents into the maxillary sinus.     At the conclusion of the inferior orbitotomy for decompression, attention was turned to the external ethmoidectomy. An external ethmoidectomy incision was made along the lateral nasal line anterior to the medial canthal tendon. Sharp dissection was carried down to the medial orbital wall. The lacrimal outflow system was retracted laterally. A subperiosteal dissection plane was carried out to expose the medial orbital wall. The medial orbital wall and lateral aspect of the ethmoid sinus was removed below the anterior and posterior ethmoidal arteries. The ethmoidal sinus was entered, and a complete extirpation of the ethmoid sinus was carried out. After removing the air cells and mucosa, bleeding was controlled with Gelfoam saturated in topical thrombin. The medial orbital periosteum was opened with sharp dissection, and gentle pressure was again applied to the globe to encourage herniation of tissue into the ethmoid sinus. The infraorbital bony strut was preserved. The medial canthal tendon was restored to its original position with 5-0 vicryl suture, and the ethmoidectomy incision was closed with 5-0 fast absorbing suture.     At the conclusion of the ethmoidectomy, attention was turned to the eyelid retraction repair. The lower lid was everted and 7 mm eye bank sclera was placed into the inferior retractor muscle layer, where it was sutured to the conjunctiva with 5-0 fast absorbing suture. The lateral canthal tendon was re-suspended with 5-0 vicryl suture anchored to the lateral orbital rim periosteum. The skin was closed with 5-0 fast absorbing suture.     The corneal protectors were removed and antibiotic ophthalmic ointment was placed over the surgical site.     The patient was then awakened and taken from the operating room in good condition, having tolerated the procedure well. There were no complications, and the estimated blood loss was less than  50 cc.

## 2020-03-12 NOTE — ANESTHESIA POSTPROCEDURE EVALUATION
Patient: Jasmine Banegas    Procedure Summary     Date:  03/12/20 Room / Location:   [facility]  /  [facility]    Anesthesia Start:  0748 Anesthesia Stop:  0919    Procedure:  LEFT ORBITAL DECOMPRESSION, LEFT EXTERNAL ETHMOIDECTOMY, LEFT LOWER LID RETRACTION REPAIR WITH EYE BANK SCLERA (Left Face) Diagnosis:      Surgeon:  Negrito Dennis MD Provider:  Srinath Marley MD    Anesthesia Type:  general ASA Status:  3          Anesthesia Type: general    Vitals  Vitals Value Taken Time   /72 3/12/2020 10:00 AM   Temp 36.6 °C (97.8 °F) 3/12/2020 10:00 AM   Pulse 65 3/12/2020 10:03 AM   Resp 16 3/12/2020 10:00 AM   SpO2 95 % 3/12/2020 10:04 AM   Vitals shown include unvalidated device data.        Post Anesthesia Care and Evaluation    Patient location during evaluation: bedside  Patient participation: complete - patient participated  Level of consciousness: awake and alert  Pain management: adequate  Airway patency: patent  Anesthetic complications: No anesthetic complications  PONV Status: controlled  Cardiovascular status: blood pressure returned to baseline and acceptable  Respiratory status: acceptable  Hydration status: acceptable

## 2020-04-27 RX ORDER — SIMVASTATIN 20 MG
TABLET ORAL
Qty: 90 TABLET | Refills: 0 | Status: SHIPPED | OUTPATIENT
Start: 2020-04-27 | End: 2020-07-14 | Stop reason: SDUPTHER

## 2020-04-27 RX ORDER — FLUTICASONE PROPIONATE 50 MCG
SPRAY, SUSPENSION (ML) NASAL
Qty: 16 G | Refills: 0 | Status: SHIPPED | OUTPATIENT
Start: 2020-04-27 | End: 2020-08-07

## 2020-04-27 RX ORDER — LISINOPRIL 10 MG/1
TABLET ORAL
Qty: 90 TABLET | Refills: 0 | Status: SHIPPED | OUTPATIENT
Start: 2020-04-27 | End: 2020-07-14 | Stop reason: SDUPTHER

## 2020-04-27 RX ORDER — MONTELUKAST SODIUM 10 MG/1
TABLET ORAL
Qty: 90 TABLET | Refills: 0 | Status: SHIPPED | OUTPATIENT
Start: 2020-04-27 | End: 2020-07-14 | Stop reason: SDUPTHER

## 2020-04-27 RX ORDER — LEVOTHYROXINE SODIUM 0.07 MG/1
TABLET ORAL
Qty: 90 TABLET | Refills: 0 | Status: SHIPPED | OUTPATIENT
Start: 2020-04-27 | End: 2020-07-14 | Stop reason: SDUPTHER

## 2020-07-06 DIAGNOSIS — E78.5 HYPERLIPIDEMIA, UNSPECIFIED HYPERLIPIDEMIA TYPE: Primary | ICD-10-CM

## 2020-07-06 DIAGNOSIS — E03.9 HYPOTHYROIDISM, UNSPECIFIED TYPE: ICD-10-CM

## 2020-07-06 DIAGNOSIS — E55.9 VITAMIN D DEFICIENCY: ICD-10-CM

## 2020-07-07 LAB
25(OH)D3+25(OH)D2 SERPL-MCNC: 72.6 NG/ML (ref 30–100)
ALBUMIN SERPL-MCNC: 4.4 G/DL (ref 3.5–5.2)
ALBUMIN/GLOB SERPL: 2.1 G/DL
ALP SERPL-CCNC: 43 U/L (ref 39–117)
ALT SERPL-CCNC: 15 U/L (ref 1–33)
APPEARANCE UR: CLEAR
AST SERPL-CCNC: 23 U/L (ref 1–32)
BACTERIA #/AREA URNS HPF: NORMAL /HPF
BASOPHILS # BLD AUTO: 0.04 10*3/MM3 (ref 0–0.2)
BASOPHILS NFR BLD AUTO: 0.7 % (ref 0–1.5)
BILIRUB SERPL-MCNC: 0.6 MG/DL (ref 0–1.2)
BILIRUB UR QL STRIP: NEGATIVE
BUN SERPL-MCNC: 11 MG/DL (ref 8–23)
BUN/CREAT SERPL: 16.2 (ref 7–25)
CALCIUM SERPL-MCNC: 8.9 MG/DL (ref 8.6–10.5)
CASTS URNS MICRO: NORMAL
CHLORIDE SERPL-SCNC: 100 MMOL/L (ref 98–107)
CHOLEST SERPL-MCNC: 149 MG/DL (ref 0–200)
CO2 SERPL-SCNC: 27.4 MMOL/L (ref 22–29)
COLOR UR: YELLOW
CREAT SERPL-MCNC: 0.68 MG/DL (ref 0.57–1)
EOSINOPHIL # BLD AUTO: 0.1 10*3/MM3 (ref 0–0.4)
EOSINOPHIL NFR BLD AUTO: 1.9 % (ref 0.3–6.2)
EPI CELLS #/AREA URNS HPF: NORMAL /HPF
ERYTHROCYTE [DISTWIDTH] IN BLOOD BY AUTOMATED COUNT: 15.1 % (ref 12.3–15.4)
GLOBULIN SER CALC-MCNC: 2.1 GM/DL
GLUCOSE SERPL-MCNC: 94 MG/DL (ref 65–99)
GLUCOSE UR QL: NEGATIVE
HCT VFR BLD AUTO: 37.1 % (ref 34–46.6)
HDLC SERPL-MCNC: 57 MG/DL (ref 40–60)
HGB BLD-MCNC: 12.5 G/DL (ref 12–15.9)
HGB UR QL STRIP: NEGATIVE
IMM GRANULOCYTES # BLD AUTO: 0.01 10*3/MM3 (ref 0–0.05)
IMM GRANULOCYTES NFR BLD AUTO: 0.2 % (ref 0–0.5)
KETONES UR QL STRIP: NEGATIVE
LDLC SERPL CALC-MCNC: 83 MG/DL (ref 0–100)
LEUKOCYTE ESTERASE UR QL STRIP: NEGATIVE
LYMPHOCYTES # BLD AUTO: 1.65 10*3/MM3 (ref 0.7–3.1)
LYMPHOCYTES NFR BLD AUTO: 30.7 % (ref 19.6–45.3)
MCH RBC QN AUTO: 29.3 PG (ref 26.6–33)
MCHC RBC AUTO-ENTMCNC: 33.7 G/DL (ref 31.5–35.7)
MCV RBC AUTO: 87.1 FL (ref 79–97)
MONOCYTES # BLD AUTO: 0.43 10*3/MM3 (ref 0.1–0.9)
MONOCYTES NFR BLD AUTO: 8 % (ref 5–12)
NEUTROPHILS # BLD AUTO: 3.15 10*3/MM3 (ref 1.7–7)
NEUTROPHILS NFR BLD AUTO: 58.5 % (ref 42.7–76)
NITRITE UR QL STRIP: NEGATIVE
NRBC BLD AUTO-RTO: 0 /100 WBC (ref 0–0.2)
PH UR STRIP: 7.5 [PH] (ref 5–8)
PLATELET # BLD AUTO: 200 10*3/MM3 (ref 140–450)
POTASSIUM SERPL-SCNC: 4.3 MMOL/L (ref 3.5–5.2)
PROT SERPL-MCNC: 6.5 G/DL (ref 6–8.5)
PROT UR QL STRIP: NEGATIVE
RBC # BLD AUTO: 4.26 10*6/MM3 (ref 3.77–5.28)
RBC #/AREA URNS HPF: NORMAL /HPF
SODIUM SERPL-SCNC: 135 MMOL/L (ref 136–145)
SP GR UR: 1.01 (ref 1–1.03)
T4 FREE SERPL-MCNC: 1.68 NG/DL (ref 0.93–1.7)
TRIGL SERPL-MCNC: 47 MG/DL (ref 0–150)
TSH SERPL DL<=0.005 MIU/L-ACNC: 0.3 UIU/ML (ref 0.27–4.2)
UROBILINOGEN UR STRIP-MCNC: NORMAL MG/DL
VLDLC SERPL CALC-MCNC: 9.4 MG/DL
WBC # BLD AUTO: 5.38 10*3/MM3 (ref 3.4–10.8)
WBC #/AREA URNS HPF: NORMAL /HPF

## 2020-07-09 ENCOUNTER — TRANSCRIBE ORDERS (OUTPATIENT)
Dept: PREADMISSION TESTING | Facility: HOSPITAL | Age: 75
End: 2020-07-09

## 2020-07-09 DIAGNOSIS — Z01.818 OTHER SPECIFIED PRE-OPERATIVE EXAMINATION: Primary | ICD-10-CM

## 2020-07-10 ENCOUNTER — TELEPHONE (OUTPATIENT)
Dept: INTERNAL MEDICINE | Facility: CLINIC | Age: 75
End: 2020-07-10

## 2020-07-10 NOTE — TELEPHONE ENCOUNTER
PATIENT CALLING ABOUT WHAT LABS WERE DONE THIS PAST Tuesday. SHE IS HAVING EYE SURGERY ON July 21 AND WANTS TO KNOW IF THEY CAN USE THE SAME LABS.    PLEASE ADVISE    [phone] (H)

## 2020-07-14 ENCOUNTER — APPOINTMENT (OUTPATIENT)
Dept: PREADMISSION TESTING | Facility: HOSPITAL | Age: 75
End: 2020-07-14

## 2020-07-14 ENCOUNTER — OFFICE VISIT (OUTPATIENT)
Dept: INTERNAL MEDICINE | Facility: CLINIC | Age: 75
End: 2020-07-14

## 2020-07-14 VITALS
BODY MASS INDEX: 18.96 KG/M2 | OXYGEN SATURATION: 99 % | TEMPERATURE: 97.7 F | HEIGHT: 63 IN | HEART RATE: 52 BPM | SYSTOLIC BLOOD PRESSURE: 145 MMHG | RESPIRATION RATE: 16 BRPM | DIASTOLIC BLOOD PRESSURE: 77 MMHG | WEIGHT: 107 LBS

## 2020-07-14 VITALS
BODY MASS INDEX: 19.14 KG/M2 | DIASTOLIC BLOOD PRESSURE: 70 MMHG | HEART RATE: 51 BPM | HEIGHT: 62 IN | OXYGEN SATURATION: 98 % | WEIGHT: 104 LBS | SYSTOLIC BLOOD PRESSURE: 130 MMHG

## 2020-07-14 DIAGNOSIS — E03.9 ADULT HYPOTHYROIDISM: ICD-10-CM

## 2020-07-14 DIAGNOSIS — I10 ESSENTIAL HYPERTENSION: ICD-10-CM

## 2020-07-14 DIAGNOSIS — M81.0 OSTEOPOROSIS, UNSPECIFIED OSTEOPOROSIS TYPE, UNSPECIFIED PATHOLOGICAL FRACTURE PRESENCE: ICD-10-CM

## 2020-07-14 DIAGNOSIS — Z12.11 COLON CANCER SCREENING: ICD-10-CM

## 2020-07-14 DIAGNOSIS — Z00.00 WELL ADULT EXAM: ICD-10-CM

## 2020-07-14 DIAGNOSIS — Z00.00 MEDICARE ANNUAL WELLNESS VISIT, SUBSEQUENT: Primary | ICD-10-CM

## 2020-07-14 DIAGNOSIS — E78.5 HYPERLIPIDEMIA, UNSPECIFIED HYPERLIPIDEMIA TYPE: ICD-10-CM

## 2020-07-14 PROBLEM — G89.18 POST-OP PAIN: Status: RESOLVED | Noted: 2020-01-17 | Resolved: 2020-07-14

## 2020-07-14 PROBLEM — E05.00 GRAVES' EYE DISEASE: Status: ACTIVE | Noted: 2020-07-14

## 2020-07-14 PROCEDURE — G0439 PPPS, SUBSEQ VISIT: HCPCS | Performed by: INTERNAL MEDICINE

## 2020-07-14 PROCEDURE — 93005 ELECTROCARDIOGRAM TRACING: CPT

## 2020-07-14 PROCEDURE — 99397 PER PM REEVAL EST PAT 65+ YR: CPT | Performed by: INTERNAL MEDICINE

## 2020-07-14 PROCEDURE — 96160 PT-FOCUSED HLTH RISK ASSMT: CPT | Performed by: INTERNAL MEDICINE

## 2020-07-14 PROCEDURE — 93010 ELECTROCARDIOGRAM REPORT: CPT | Performed by: INTERNAL MEDICINE

## 2020-07-14 RX ORDER — MONTELUKAST SODIUM 10 MG/1
10 TABLET ORAL DAILY
Qty: 90 TABLET | Refills: 3 | Status: SHIPPED | OUTPATIENT
Start: 2020-07-14 | End: 2021-07-27 | Stop reason: SDUPTHER

## 2020-07-14 RX ORDER — LEVOTHYROXINE SODIUM 0.07 MG/1
75 TABLET ORAL DAILY
Qty: 90 TABLET | Refills: 3 | Status: SHIPPED | OUTPATIENT
Start: 2020-07-14 | End: 2021-07-16

## 2020-07-14 RX ORDER — LISINOPRIL 10 MG/1
10 TABLET ORAL DAILY
Qty: 90 TABLET | Refills: 3 | Status: SHIPPED | OUTPATIENT
Start: 2020-07-14 | End: 2021-07-27 | Stop reason: SDUPTHER

## 2020-07-14 RX ORDER — SIMVASTATIN 20 MG
20 TABLET ORAL
Qty: 90 TABLET | Refills: 3 | Status: SHIPPED | OUTPATIENT
Start: 2020-07-14 | End: 2021-07-27 | Stop reason: SDUPTHER

## 2020-07-14 NOTE — DISCHARGE INSTRUCTIONS
Take the following medications the morning of surgery:    BRIMONIDINE (ALPHAGAN) OPHTHALMIC SOLUTION DROPS  LEVOTHYROXINE (SYNTHROID)     General Instructions:  • Do not eat solid food after midnight the night before surgery.  • You may drink clear liquids day of surgery but must stop at least one hour before your hospital arrival time @ 0445 AM STOP DRINKING!  • It is beneficial for you to have a clear drink that contains carbohydrates the day of surgery.  We suggest a 12 to 20 ounce bottle of Gatorade or Powerade for non-diabetic patients or a 12 to 20 ounce bottle of G2 or Powerade Zero for diabetic patients.     Clear liquids are liquids you can see through.  Nothing red in color.     Plain water                               Sports drinks  Sodas                                   Gelatin (Jell-O)  Fruit juices without pulp such as white grape juice and apple juice  Popsicles that contain no fruit or yogurt  Tea or coffee (no cream or milk added)  Gatorade / Powerade  G2 / Powerade Zero    • Patients who avoid smoking, chewing tobacco and alcohol for 4 weeks prior to surgery have a reduced risk of post-operative complications.  Quit smoking as many days before surgery as you can.  • Do not smoke, use chewing tobacco or drink alcohol the day of surgery.   • If applicable bring your C-PAP/ BI-PAP machine.  • Bring any papers given to you in the doctor’s office.  • Wear clean comfortable clothes.  • Do not wear contact lenses, false eyelashes or make-up.  Bring a case for your glasses.   • Bring crutches or walker if applicable.  • Remove all piercings.  Leave jewelry and any other valuables at home.  • Hair extensions with metal clips must be removed prior to surgery.  • The Pre-Admission Testing nurse will instruct you to bring medications if unable to obtain an accurate list in Pre-Admission Testing.        Preventing a Surgical Site Infection:  • For 2 to 3 days before surgery, avoid shaving with a razor because  the razor can irritate skin and make it easier to develop an infection.    • Any areas of open skin can increase the risk of a post-operative wound infection by allowing bacteria to enter and travel throughout the body.  Notify your surgeon if you have any skin wounds / rashes even if it is not near the expected surgical site.  The area will need assessed to determine if surgery should be delayed until it is healed.  • The night prior to surgery shower using a fresh bar of anti-bacterial soap (such as Dial) and clean washcloth.  Sleep in a clean bed with clean clothing.  Do not allow pets to sleep with you.  • Shower on the morning of surgery using a fresh bar of anti-bacterial soap (such as Dial) and clean washcloth.  Dry with a clean towel and dress in clean clothing.  • Ask your surgeon if you will be receiving antibiotics prior to surgery.  • Make sure you, your family, and all healthcare providers clean their hands with soap and water or an alcohol based hand  before caring for you or your wound.    Day of surgery:07- ARRIVE @ 0545 AM PER DR. MARES'S OFFICE REPORT TO THE [facility]  Your arrival time is approximately two hours before your scheduled surgery time.  Upon arrival, a Pre-op nurse and Anesthesiologist will review your health history, obtain vital signs, and answer questions you may have.  The only belongings needed at this time will be a list of your home medications and if applicable your C-PAP/BI-PAP machine.  If you are staying overnight your family can leave the rest of your belongings in the car and bring them to your room later.  A Pre-op nurse will start an IV and you may receive medication in preparation for surgery, including something to help you relax.  Your family will be able to see you in the Pre-op area.  Two visitors at a time will be allowed in the Pre-op room.  While you are in surgery your family should notify the waiting room  if they leave the waiting room  area and provide a contact phone number.    Please be aware that surgery does come with discomfort.  We want to make every effort to control your discomfort so please discuss any uncontrolled symptoms with your nurse.   Your doctor will most likely have prescribed pain medications.      If you are going home after surgery you will receive individualized written care instructions before being discharged.  A responsible adult must drive you to and from the hospital on the day of your surgery and stay with you for 24 hours.    If you are staying overnight following surgery, you will be transported to your hospital room following the recovery period.  [facility] has all private rooms.    If you have any questions please call Pre-Admission Testing at [phone].  Deductibles and co-payments are collected on the day of service. Please be prepared to pay the required co-pay, deductible or deposit on the day of service as defined by your plan.    Patient Education for Self-Quarantine Process  07- @ 02:30 PM   COVID SCREEN TEST SCHEDULED    Following your COVID testing, we strongly recommend that you do not leave your home after you have been tested for COVID except to get medical care. This includes not going to work, school or to public areas.  If this is not possible for you to do please limit your activities to only required outings.  Be sure to wear a mask when you are with other people, practice social distancing and wash your hands frequently.      The following items provide additional details to keep you safe.  • Wash your hands with soap and water frequently for at least 20 seconds.   • Avoid touching your eyes, nose and mouth with unwashed hands.  • Do not share anything - utensils, towels, food from the same bowl.   • Have your own utensils, drinking glass, dishes, towels and bedding.   • Do not have visitors.   • Do use FaceTime to stay in touch with family and friends.  • You should  stay in a specific room away from others if possible.   • Stay at least 6 feet away from others in the home if you cannot have a dedicated room to yourself.   • Do not snuggle with your pet. While the CDC says there is no evidence that pets can spread COVID-19 or be infected from humans, it is probably best to avoid “petting, snuggling, being kissed or licked and sharing food (during self-quarantine)”, according to the CDC.   • Sanitize household surfaces daily. Include all high touch areas (door handles, light switches, phones, countertops, etc.)  • Do not share a bathroom with others, if possible.   • Wear a mask around others in your home if you are unable to stay in a separate room or 6 feet apart. If  you are unable to wear a mask, have your family member wear a mask if they must be within 6 feet of you.       Call your surgeon immediately if you experience any of the following symptoms:  • Sore Throat  • Shortness of Breath or difficulty breathing  • Cough  • Chills  • Body soreness or muscle pain  • Headache  • Fever  • New loss of taste or smell  • Do not arrive for your surgery ill.  Your procedure will need to be rescheduled to another time.  You will need to call your physician before the day of surgery to avoid any unnecessary exposure to hospital staff as well as other patients.

## 2020-07-14 NOTE — PROGRESS NOTES
Subjective     Jasmine Banegas is a 74 y.o. female who presents for an annual wellness visit/cpe as well as check up of chronic medical issues.      History of Present Illness     Hypothyroidism.  She is under excellent control.  HLD.  Good control with simvastatin.  HTN.  Control is good.  OP.  She is due for DEXA and prolia shot.     Review of Systems   Constitutional: Negative.    HENT: Negative.    Eyes: Positive for visual disturbance.   Respiratory: Negative.    Cardiovascular: Negative.    Gastrointestinal: Negative.    Endocrine: Negative.    Genitourinary: Negative.    Musculoskeletal: Negative.    Skin: Negative.    Allergic/Immunologic: Negative.    Neurological: Negative.    Hematological: Negative.    Psychiatric/Behavioral: Negative.        The following portions of the patient's history were reviewed and updated as appropriate: allergies, current medications, past family history, past medical history, past social history, past surgical history and problem list.  Health maintenance tab was reviewed and updated with the patient.       Patient Active Problem List    Diagnosis Date Noted   • Graves' eye disease 07/14/2020   • Osteoarthritis of knee 03/19/2018   • Glaucoma of both eyes 03/14/2017   • Overactive bladder 03/07/2016   • HLD (hyperlipidemia) 02/25/2016     Note Last Updated: 2/25/2016     Description: Statins started in 2/2014 secondary to 7.6% 10 year ASCVD risk.     • Adult hypothyroidism 02/25/2016   • OP (osteoporosis) 02/25/2016     Note Last Updated: 2/25/2016     Description: Fosamax 0612-3420  Prolia started in 2014. Recommend 1200mg calcium and 1000IUs D in diet/supplements.     • Essential hypertension 02/25/2016     Note Last Updated: 2/25/2016     Impression: 02/12/2014 - Blood pressure is well-controlled when not in the office.  I do encourage to check regularly.;          Past Medical History:   Diagnosis Date   • Brittle bones    • Glaucoma    • Graves disease    • History of  transfusion    • Hyperlipidemia    • Hypertension    • Osteoporosis    • Overactive bladder    • PONV (postoperative nausea and vomiting)     SEVERE   • Slow to wake up after anesthesia        Past Surgical History:   Procedure Laterality Date   • COLONOSCOPY     • ECTROPION REPAIR Right 1/17/2020    Procedure: RIGHT EXTERNAL ETHMOIDECTOMY, RIGHT LOWER LID RETRACTION REPAIR WITH EYE BANK SCLERA;  Surgeon: Negrito Dennis MD;  Location: [facility];  Service: Ophthalmology   • FEMUR FRACTURE SURGERY Left    • HIP FRACTURE SURGERY Bilateral     pin & plates in left hip; pin & plate removed right   • ORBITAL DECOMPRESSION Right 1/17/2020    Procedure: RIGHT ORBITAL DECOMPRESSION;  Surgeon: Negrito Dennis MD;  Location: [facility] [place], [place];  Service: Ophthalmology   • ORBITAL DECOMPRESSION Left 3/12/2020    Procedure: LEFT ORBITAL DECOMPRESSION, LEFT EXTERNAL ETHMOIDECTOMY, LEFT LOWER LID RETRACTION REPAIR WITH EYE BANK SCLERA;  Surgeon: Negrito Dennis MD;  Location: [facility];  Service: Ophthalmology;  Laterality: Left;   • TOTAL THYROIDECTOMY         Family History   Problem Relation Age of Onset   • Hypertension Mother    • Heart disease Father    • Alcohol abuse Brother    • Malig Hyperthermia Neg Hx        Social History     Socioeconomic History   • Marital status: Single     Spouse name: Not on file   • Number of children: Not on file   • Years of education: Not on file   • Highest education level: Not on file   Tobacco Use   • Smoking status: Never Smoker   • Smokeless tobacco: Never Used   Substance and Sexual Activity   • Alcohol use: Yes     Comment: rare   • Drug use: Never   • Sexual activity: Defer       Current Outpatient Medications on File Prior to Visit   Medication Sig Dispense Refill   • brimonidine (ALPHAGAN) 0.2 % ophthalmic solution Administer 1 drop to both eyes 2 (Two) Times a Day.     • Calcium-Magnesium-Vitamin D ER (CALCIUM 1200+D3) 600- MG-MG-UNIT tablet  sustained-release 24 hour Take 1 tablet by mouth Every Morning.     • denosumab (PROLIA) 60 MG/ML solution syringe Inject 60 mg under the skin into the appropriate area as directed Every 6 (Six) Months.     • dorzolamide-timolol (COSOPT) 22.3-6.8 MG/ML ophthalmic solution Administer 1 drop to both eyes 2 (Two) Times a Day.  99   • fluticasone (FLONASE) 50 MCG/ACT nasal spray USE 2 SPRAYS IN EACH NOSTRIL EVERY DAY AS DIRECTED BY PROVIDER 16 g 0   • Multiple Vitamins-Minerals (MULTIVITAMIN ADULT PO) Take 1 tablet by mouth Every Morning. PT HOLDING FOR SURGERY     • Netarsudil-Latanoprost (ROCKLATAN) 0.02-0.005 % solution Administer 1 drop into the left eye Every Night.     • [DISCONTINUED] levothyroxine (SYNTHROID, LEVOTHROID) 75 MCG tablet Take 37.5 mcg by mouth Daily. WEDNESDAYS ONLY     • [DISCONTINUED] levothyroxine (SYNTHROID, LEVOTHROID) 75 MCG tablet Take 1 tablet by mouth once daily 90 tablet 0   • [DISCONTINUED] lisinopril (PRINIVIL,ZESTRIL) 10 MG tablet Take 1 tablet by mouth once daily 90 tablet 0   • [DISCONTINUED] montelukast (SINGULAIR) 10 MG tablet Take 1 tablet by mouth once daily 90 tablet 0   • [DISCONTINUED] simvastatin (ZOCOR) 20 MG tablet TAKE 1 TABLET BY MOUTH EVERY DAY AT BEDTIME 90 tablet 0   • [DISCONTINUED] erythromycin (ROMYCIN) 5 MG/GM ophthalmic ointment Administer  into the left eye 2 (Two) Times a Day. 3.5 g 2   • [DISCONTINUED] HYDROcodone-acetaminophen (NORCO) 7.5-325 MG per tablet Take 1 tablet by mouth Every 6 (Six) Hours As Needed for Moderate Pain  (pain). 15 tablet 0     No current facility-administered medications on file prior to visit.        Allergies   Allergen Reactions   • Sulfa Antibiotics Hives       Immunization History   Administered Date(s) Administered   • Fluzone High Dose =>65 Years ([employer] ONLY) 10/09/2018, 10/14/2019   • Pneumococcal Conjugate 13-Valent (PCV13) 03/19/2015, 03/07/2016   • Pneumococcal Polysaccharide (PPSV23) 03/14/2017   • Tdap 10/01/2012  "      Objective     /70   Pulse 51   Ht 157.5 cm (62.01\")   Wt 47.2 kg (104 lb)   SpO2 98%   BMI 19.02 kg/m²     Physical Exam   Constitutional: She is oriented to person, place, and time. She appears well-developed and well-nourished.   HENT:   Head: Normocephalic and atraumatic.   Right Ear: Hearing, tympanic membrane and external ear normal.   Left Ear: Hearing, tympanic membrane and external ear normal.   Nose: Nose normal.   Mouth/Throat: Oropharynx is clear and moist.   Neck: Neck supple. No thyromegaly present.   Cardiovascular: Normal rate, regular rhythm and normal heart sounds.   No murmur heard.  Pulmonary/Chest: Effort normal and breath sounds normal. Right breast exhibits no mass. Left breast exhibits no mass. No breast tenderness.   Abdominal: Soft. She exhibits no distension. There is no hepatosplenomegaly. There is no tenderness.   Genitourinary: No breast tenderness.   Lymphadenopathy:     She has no cervical adenopathy.   Neurological: She is alert and oriented to person, place, and time.   Skin: Skin is warm and dry.   Psychiatric: She has a normal mood and affect. Her speech is normal and behavior is normal. Judgment and thought content normal. Cognition and memory are normal.       Assessment/Plan   Jasmine was seen today for medicare wellness-subsequent.    Diagnoses and all orders for this visit:    Medicare annual wellness visit, subsequent    Well adult exam    Colon cancer screening  -     Ambulatory Referral For Screening Colonoscopy    Essential hypertension    Hyperlipidemia, unspecified hyperlipidemia type    Adult hypothyroidism    Osteoporosis, unspecified osteoporosis type, unspecified pathological fracture presence    Other orders  -     lisinopril (PRINIVIL,ZESTRIL) 10 MG tablet; Take 1 tablet by mouth Daily.  -     montelukast (SINGULAIR) 10 MG tablet; Take 1 tablet by mouth Daily.  -     simvastatin (ZOCOR) 20 MG tablet; Take 1 tablet by mouth every night at bedtime.  -  "    levothyroxine (SYNTHROID, LEVOTHROID) 75 MCG tablet; Take 1 tablet by mouth Daily.        Discussion    AWV/cpe.  See below for rosa history, PHQ-9, functional ability questionnaire, cognitive impairment screening.  Direct observation of cognitive abilities:  The patient does not exhibit any impairment in cognitive abilities upon direct observation at today's visit.   These were all reviewed with the patient and the patient was provided with a personal prevention plan of service in patient instructions.  Patient was given advice or information on the following topics:  nutrition, exercise.  ACP discussion was held with the patient during this visit. Patient does not have an advance directive, information provided.   Hypothyroidism.  Continue current.    HLD.  Continue simvastatin.    HTN.  Continue lisinopril.      Osteoporosis.  I recommend to get 1200 mg of calcium and 1000 IUs of vitamin D through diet and supplements and to participate in a weight based exercise to prevent loss of bone mineral density. Bone mineral will be monitored every two years.  Continue Prolia.     Depression Screen:    PHQ-2/PHQ-9 Depression Screening 7/14/2020   Little interest or pleasure in doing things 0   Feeling down, depressed, or hopeless 0   Trouble falling or staying asleep, or sleeping too much -   Feeling tired or having little energy -   Poor appetite or overeating -   Feeling bad about yourself - or that you are a failure or have let yourself or your family down -   Trouble concentrating on things, such as reading the newspaper or watching television -   Moving or speaking so slowly that other people could have noticed. Or the opposite - being so fidgety or restless that you have been moving around a lot more than usual -   Thoughts that you would be better off dead, or of hurting yourself in some way -   Total Score 0   If you checked off any problems, how difficult have these problems made it for you to do your work,  take care of things at home, or get along with other people? -       Fall Risk Screen:  JAY Fall Risk Assessment was completed, and patient is at LOW risk for falls.Assessment completed on:7/14/2020    Health Habits and Functional/Cognitive screen:  Functional & Cognitive Status 3/25/2019   Do you have difficulty preparing food and eating? No   Do you have difficulty bathing yourself, getting dressed or grooming yourself? No   Do you have difficulty using the toilet? No   Do you have difficulty moving around from place to place? No   Do you have trouble with steps or getting out of a bed or a chair? No   Current Diet Well Balanced Diet   Dental Exam Up to date   Eye Exam Up to date   Exercise (times per week) 2 times per week   Current Exercise Activities Include Weightlifting   Do you need help using the phone?  No   Are you deaf or do you have serious difficulty hearing?  No   Do you need help with transportation? No   Do you need help shopping? No   Do you need help preparing meals?  No   Do you need help with housework?  No   Do you need help with laundry? No   Do you need help taking your medications? No   Do you need help managing money? No   Do you ever drive or ride in a car without wearing a seat belt? No   Have you felt unusual stress, anger or loneliness in the last month? No   Who do you live with? Alone   If you need help, do you have trouble finding someone available to you? No   Have you been bothered in the last four weeks by sexual problems? No   Do you have difficulty concentrating, remembering or making decisions? No       I have recommended that the patient get the following immunizations:  Shingrix.        Health Maintenance   Topic Date Due   • ZOSTER VACCINE (1 of 2) 08/08/1996   • COLONOSCOPY  12/31/2018   • MEDICARE ANNUAL WELLNESS  03/25/2020   • DXA SCAN  04/02/2020   • INFLUENZA VACCINE  08/01/2020   • LIPID PANEL  07/07/2021   • MAMMOGRAM  10/02/2021   • TDAP/TD VACCINES (2 - Td)  10/01/2022   • HEPATITIS C SCREENING  Completed   • Pneumococcal Vaccine Once at 65 Years Old  Completed            Future Appointments   Date Time Provider Department Center   7/14/2020 12:00 PM  [facility] 5  [facility]   7/18/2020  2:30 PM C19 PRE SCREEN [facility]  [facility] C19PS [facility]

## 2020-07-15 DIAGNOSIS — M81.0 OSTEOPOROSIS, UNSPECIFIED OSTEOPOROSIS TYPE, UNSPECIFIED PATHOLOGICAL FRACTURE PRESENCE: Primary | ICD-10-CM

## 2020-07-18 ENCOUNTER — LAB (OUTPATIENT)
Dept: LAB | Facility: HOSPITAL | Age: 75
End: 2020-07-18

## 2020-07-18 DIAGNOSIS — Z01.818 OTHER SPECIFIED PRE-OPERATIVE EXAMINATION: ICD-10-CM

## 2020-07-18 PROCEDURE — U0004 COV-19 TEST NON-CDC HGH THRU: HCPCS

## 2020-07-18 PROCEDURE — C9803 HOPD COVID-19 SPEC COLLECT: HCPCS

## 2020-07-20 LAB
REF LAB TEST METHOD: NORMAL
SARS-COV-2 RNA RESP QL NAA+PROBE: NOT DETECTED

## 2020-07-21 ENCOUNTER — ANESTHESIA EVENT (OUTPATIENT)
Dept: PERIOP | Facility: HOSPITAL | Age: 75
End: 2020-07-21

## 2020-07-21 ENCOUNTER — HOSPITAL ENCOUNTER (OUTPATIENT)
Facility: HOSPITAL | Age: 75
Setting detail: HOSPITAL OUTPATIENT SURGERY
Discharge: HOME | End: 2020-07-21
Attending: OPHTHALMOLOGY | Admitting: OPHTHALMOLOGY

## 2020-07-21 ENCOUNTER — ANESTHESIA (OUTPATIENT)
Dept: PERIOP | Facility: HOSPITAL | Age: 75
End: 2020-07-21

## 2020-07-21 VITALS
RESPIRATION RATE: 16 BRPM | SYSTOLIC BLOOD PRESSURE: 161 MMHG | OXYGEN SATURATION: 95 % | HEART RATE: 64 BPM | TEMPERATURE: 97.6 F | DIASTOLIC BLOOD PRESSURE: 77 MMHG

## 2020-07-21 PROCEDURE — 25010000002 HYDROMORPHONE PER 4 MG: Performed by: NURSE ANESTHETIST, CERTIFIED REGISTERED

## 2020-07-21 PROCEDURE — 25010000002 PROPOFOL 10 MG/ML EMULSION: Performed by: NURSE ANESTHETIST, CERTIFIED REGISTERED

## 2020-07-21 PROCEDURE — 25010000002 ONDANSETRON PER 1 MG: Performed by: NURSE ANESTHETIST, CERTIFIED REGISTERED

## 2020-07-21 PROCEDURE — 25010000002 FENTANYL CITRATE (PF) 100 MCG/2ML SOLUTION: Performed by: ANESTHESIOLOGY

## 2020-07-21 PROCEDURE — 25010000002 NEOSTIGMINE PER 0.5 MG: Performed by: NURSE ANESTHETIST, CERTIFIED REGISTERED

## 2020-07-21 PROCEDURE — 25010000002 MIDAZOLAM PER 1 MG: Performed by: NURSE ANESTHETIST, CERTIFIED REGISTERED

## 2020-07-21 DEVICE — LACRIMAL INTUBATION SET CRAWFORD
Type: IMPLANTABLE DEVICE | Site: LACRIMAL DUCT | Status: FUNCTIONAL
Brand: CRAWFORD

## 2020-07-21 RX ORDER — HYDROCODONE BITARTRATE AND ACETAMINOPHEN 5; 325 MG/1; MG/1
1 TABLET ORAL EVERY 6 HOURS PRN
Qty: 20 TABLET | Refills: 0 | Status: SHIPPED | OUTPATIENT
Start: 2020-07-21 | End: 2020-08-10

## 2020-07-21 RX ORDER — GLYCOPYRROLATE 0.2 MG/ML
INJECTION INTRAMUSCULAR; INTRAVENOUS AS NEEDED
Status: DISCONTINUED | OUTPATIENT
Start: 2020-07-21 | End: 2020-07-21 | Stop reason: SURG

## 2020-07-21 RX ORDER — PROPOFOL 10 MG/ML
VIAL (ML) INTRAVENOUS AS NEEDED
Status: DISCONTINUED | OUTPATIENT
Start: 2020-07-21 | End: 2020-07-21 | Stop reason: SURG

## 2020-07-21 RX ORDER — PROMETHAZINE HYDROCHLORIDE 25 MG/ML
6.25 INJECTION, SOLUTION INTRAMUSCULAR; INTRAVENOUS
Status: DISCONTINUED | OUTPATIENT
Start: 2020-07-21 | End: 2020-07-21 | Stop reason: HOSPADM

## 2020-07-21 RX ORDER — OXYCODONE AND ACETAMINOPHEN 7.5; 325 MG/1; MG/1
1 TABLET ORAL ONCE AS NEEDED
Status: DISCONTINUED | OUTPATIENT
Start: 2020-07-21 | End: 2020-07-21 | Stop reason: HOSPADM

## 2020-07-21 RX ORDER — ONDANSETRON 2 MG/ML
INJECTION INTRAMUSCULAR; INTRAVENOUS AS NEEDED
Status: DISCONTINUED | OUTPATIENT
Start: 2020-07-21 | End: 2020-07-21 | Stop reason: SURG

## 2020-07-21 RX ORDER — FAMOTIDINE 10 MG/ML
20 INJECTION, SOLUTION INTRAVENOUS ONCE
Status: COMPLETED | OUTPATIENT
Start: 2020-07-21 | End: 2020-07-21

## 2020-07-21 RX ORDER — PROMETHAZINE HYDROCHLORIDE 25 MG/ML
12.5 INJECTION, SOLUTION INTRAMUSCULAR; INTRAVENOUS ONCE AS NEEDED
Status: DISCONTINUED | OUTPATIENT
Start: 2020-07-21 | End: 2020-07-21 | Stop reason: HOSPADM

## 2020-07-21 RX ORDER — SCOLOPAMINE TRANSDERMAL SYSTEM 1 MG/1
1 PATCH, EXTENDED RELEASE TRANSDERMAL ONCE
Status: DISCONTINUED | OUTPATIENT
Start: 2020-07-21 | End: 2020-07-21 | Stop reason: HOSPADM

## 2020-07-21 RX ORDER — ONDANSETRON 2 MG/ML
4 INJECTION INTRAMUSCULAR; INTRAVENOUS ONCE AS NEEDED
Status: DISCONTINUED | OUTPATIENT
Start: 2020-07-21 | End: 2020-07-21 | Stop reason: HOSPADM

## 2020-07-21 RX ORDER — EPHEDRINE SULFATE 50 MG/ML
5 INJECTION, SOLUTION INTRAVENOUS ONCE AS NEEDED
Status: DISCONTINUED | OUTPATIENT
Start: 2020-07-21 | End: 2020-07-21 | Stop reason: HOSPADM

## 2020-07-21 RX ORDER — FLUMAZENIL 0.1 MG/ML
0.2 INJECTION INTRAVENOUS AS NEEDED
Status: DISCONTINUED | OUTPATIENT
Start: 2020-07-21 | End: 2020-07-21 | Stop reason: HOSPADM

## 2020-07-21 RX ORDER — ERYTHROMYCIN 5 MG/G
OINTMENT OPHTHALMIC AS NEEDED
Status: DISCONTINUED | OUTPATIENT
Start: 2020-07-21 | End: 2020-07-21 | Stop reason: HOSPADM

## 2020-07-21 RX ORDER — SODIUM CHLORIDE 0.9 % (FLUSH) 0.9 %
10 SYRINGE (ML) INJECTION AS NEEDED
Status: DISCONTINUED | OUTPATIENT
Start: 2020-07-21 | End: 2020-07-21 | Stop reason: HOSPADM

## 2020-07-21 RX ORDER — PROMETHAZINE HYDROCHLORIDE 25 MG/1
25 TABLET ORAL ONCE AS NEEDED
Status: DISCONTINUED | OUTPATIENT
Start: 2020-07-21 | End: 2020-07-21 | Stop reason: HOSPADM

## 2020-07-21 RX ORDER — LIDOCAINE HYDROCHLORIDE 10 MG/ML
0.5 INJECTION, SOLUTION EPIDURAL; INFILTRATION; INTRACAUDAL; PERINEURAL ONCE AS NEEDED
Status: DISCONTINUED | OUTPATIENT
Start: 2020-07-21 | End: 2020-07-21 | Stop reason: HOSPADM

## 2020-07-21 RX ORDER — SODIUM CHLORIDE 0.9 % (FLUSH) 0.9 %
10 SYRINGE (ML) INJECTION EVERY 12 HOURS SCHEDULED
Status: DISCONTINUED | OUTPATIENT
Start: 2020-07-21 | End: 2020-07-21 | Stop reason: HOSPADM

## 2020-07-21 RX ORDER — DIPHENHYDRAMINE HYDROCHLORIDE 50 MG/ML
12.5 INJECTION INTRAMUSCULAR; INTRAVENOUS
Status: DISCONTINUED | OUTPATIENT
Start: 2020-07-21 | End: 2020-07-21 | Stop reason: HOSPADM

## 2020-07-21 RX ORDER — MIDAZOLAM HYDROCHLORIDE 1 MG/ML
INJECTION INTRAMUSCULAR; INTRAVENOUS AS NEEDED
Status: DISCONTINUED | OUTPATIENT
Start: 2020-07-21 | End: 2020-07-21 | Stop reason: SURG

## 2020-07-21 RX ORDER — HYDROCODONE BITARTRATE AND ACETAMINOPHEN 7.5; 325 MG/1; MG/1
1 TABLET ORAL ONCE AS NEEDED
Status: COMPLETED | OUTPATIENT
Start: 2020-07-21 | End: 2020-07-21

## 2020-07-21 RX ORDER — LABETALOL HYDROCHLORIDE 5 MG/ML
5 INJECTION, SOLUTION INTRAVENOUS
Status: DISCONTINUED | OUTPATIENT
Start: 2020-07-21 | End: 2020-07-21 | Stop reason: HOSPADM

## 2020-07-21 RX ORDER — MAGNESIUM HYDROXIDE 1200 MG/15ML
LIQUID ORAL AS NEEDED
Status: DISCONTINUED | OUTPATIENT
Start: 2020-07-21 | End: 2020-07-21 | Stop reason: HOSPADM

## 2020-07-21 RX ORDER — HYDRALAZINE HYDROCHLORIDE 20 MG/ML
5 INJECTION INTRAMUSCULAR; INTRAVENOUS
Status: DISCONTINUED | OUTPATIENT
Start: 2020-07-21 | End: 2020-07-21 | Stop reason: HOSPADM

## 2020-07-21 RX ORDER — HYDROMORPHONE HYDROCHLORIDE 1 MG/ML
0.5 INJECTION, SOLUTION INTRAMUSCULAR; INTRAVENOUS; SUBCUTANEOUS
Status: DISCONTINUED | OUTPATIENT
Start: 2020-07-21 | End: 2020-07-21 | Stop reason: HOSPADM

## 2020-07-21 RX ORDER — ACETAMINOPHEN 325 MG/1
650 TABLET ORAL ONCE AS NEEDED
Status: DISCONTINUED | OUTPATIENT
Start: 2020-07-21 | End: 2020-07-21 | Stop reason: HOSPADM

## 2020-07-21 RX ORDER — ACETAMINOPHEN 650 MG/1
650 SUPPOSITORY RECTAL ONCE AS NEEDED
Status: DISCONTINUED | OUTPATIENT
Start: 2020-07-21 | End: 2020-07-21 | Stop reason: HOSPADM

## 2020-07-21 RX ORDER — NALOXONE HCL 0.4 MG/ML
0.2 VIAL (ML) INJECTION AS NEEDED
Status: DISCONTINUED | OUTPATIENT
Start: 2020-07-21 | End: 2020-07-21 | Stop reason: HOSPADM

## 2020-07-21 RX ORDER — FENTANYL CITRATE 50 UG/ML
50 INJECTION, SOLUTION INTRAMUSCULAR; INTRAVENOUS
Status: DISCONTINUED | OUTPATIENT
Start: 2020-07-21 | End: 2020-07-21 | Stop reason: HOSPADM

## 2020-07-21 RX ORDER — PROMETHAZINE HYDROCHLORIDE 25 MG/1
25 SUPPOSITORY RECTAL ONCE AS NEEDED
Status: DISCONTINUED | OUTPATIENT
Start: 2020-07-21 | End: 2020-07-21 | Stop reason: HOSPADM

## 2020-07-21 RX ORDER — LIDOCAINE HYDROCHLORIDE 20 MG/ML
INJECTION, SOLUTION INFILTRATION; PERINEURAL AS NEEDED
Status: DISCONTINUED | OUTPATIENT
Start: 2020-07-21 | End: 2020-07-21 | Stop reason: SURG

## 2020-07-21 RX ORDER — SODIUM CHLORIDE, SODIUM LACTATE, POTASSIUM CHLORIDE, CALCIUM CHLORIDE 600; 310; 30; 20 MG/100ML; MG/100ML; MG/100ML; MG/100ML
9 INJECTION, SOLUTION INTRAVENOUS CONTINUOUS
Status: DISCONTINUED | OUTPATIENT
Start: 2020-07-21 | End: 2020-07-21 | Stop reason: HOSPADM

## 2020-07-21 RX ORDER — ROCURONIUM BROMIDE 10 MG/ML
INJECTION, SOLUTION INTRAVENOUS AS NEEDED
Status: DISCONTINUED | OUTPATIENT
Start: 2020-07-21 | End: 2020-07-21 | Stop reason: SURG

## 2020-07-21 RX ORDER — DIPHENHYDRAMINE HCL 25 MG
25 CAPSULE ORAL
Status: DISCONTINUED | OUTPATIENT
Start: 2020-07-21 | End: 2020-07-21 | Stop reason: HOSPADM

## 2020-07-21 RX ORDER — OXYMETAZOLINE HYDROCHLORIDE 0.05 G/100ML
SPRAY NASAL AS NEEDED
Status: DISCONTINUED | OUTPATIENT
Start: 2020-07-21 | End: 2020-07-21 | Stop reason: HOSPADM

## 2020-07-21 RX ORDER — ERYTHROMYCIN 5 MG/G
OINTMENT OPHTHALMIC 2 TIMES DAILY
Qty: 3.5 G | Refills: 1 | Status: SHIPPED | OUTPATIENT
Start: 2020-07-21 | End: 2021-03-10

## 2020-07-21 RX ADMIN — ROCURONIUM BROMIDE 40 MG: 10 INJECTION, SOLUTION INTRAVENOUS at 06:58

## 2020-07-21 RX ADMIN — LIDOCAINE HYDROCHLORIDE 40 MG: 20 INJECTION, SOLUTION INFILTRATION; PERINEURAL at 06:56

## 2020-07-21 RX ADMIN — ONDANSETRON HYDROCHLORIDE 4 MG: 2 SOLUTION INTRAMUSCULAR; INTRAVENOUS at 07:02

## 2020-07-21 RX ADMIN — PROPOFOL 120 MCG/KG/MIN: 10 INJECTION, EMULSION INTRAVENOUS at 06:56

## 2020-07-21 RX ADMIN — HYDROMORPHONE HYDROCHLORIDE 0.5 MG: 1 INJECTION, SOLUTION INTRAMUSCULAR; INTRAVENOUS; SUBCUTANEOUS at 08:49

## 2020-07-21 RX ADMIN — GLYCOPYRROLATE 0.2 MG: 0.2 INJECTION INTRAMUSCULAR; INTRAVENOUS at 07:04

## 2020-07-21 RX ADMIN — SODIUM CHLORIDE, POTASSIUM CHLORIDE, SODIUM LACTATE AND CALCIUM CHLORIDE 9 ML/HR: 600; 310; 30; 20 INJECTION, SOLUTION INTRAVENOUS at 06:28

## 2020-07-21 RX ADMIN — MIDAZOLAM 2 MG: 1 INJECTION INTRAMUSCULAR; INTRAVENOUS at 06:54

## 2020-07-21 RX ADMIN — FENTANYL CITRATE 100 MCG: 50 INJECTION INTRAMUSCULAR; INTRAVENOUS at 06:54

## 2020-07-21 RX ADMIN — Medication 3 MG: at 08:12

## 2020-07-21 RX ADMIN — HYDROCODONE BITARTRATE AND ACETAMINOPHEN 1 TABLET: 7.5; 325 TABLET ORAL at 09:11

## 2020-07-21 RX ADMIN — SCOPALAMINE 1 PATCH: 1 PATCH, EXTENDED RELEASE TRANSDERMAL at 06:28

## 2020-07-21 RX ADMIN — FAMOTIDINE 20 MG: 10 INJECTION, SOLUTION INTRAVENOUS at 06:28

## 2020-07-21 RX ADMIN — GLYCOPYRROLATE 0.4 MG: 0.2 INJECTION INTRAMUSCULAR; INTRAVENOUS at 08:12

## 2020-07-21 RX ADMIN — PROPOFOL 100 MG: 10 INJECTION, EMULSION INTRAVENOUS at 06:56

## 2020-07-21 RX ADMIN — HYDROMORPHONE HYDROCHLORIDE 0.5 MG: 1 INJECTION, SOLUTION INTRAMUSCULAR; INTRAVENOUS; SUBCUTANEOUS at 09:00

## 2020-07-21 NOTE — H&P
History & Physical       Patient: Jasmine Banegas    Date of Admission: 7/21/2020  5:52 AM    YOB: 1945    Medical Record Number: [id number]      Chief Complaints: tearing      History of Present Illness: 74 y.o. female presents with tearing due to poor drainage. No new meds/health problems since office visit      Allergies:   Allergies   Allergen Reactions   • Sulfa Antibiotics Hives       10 point review of systems negative, except pertaining to the HPI    Medications:   Home Medications:  No current facility-administered medications on file prior to encounter.      Current Outpatient Medications on File Prior to Encounter   Medication Sig   • brimonidine (ALPHAGAN) 0.2 % ophthalmic solution Administer 1 drop to both eyes 2 (Two) Times a Day.   • Calcium-Magnesium-Vitamin D ER (CALCIUM 1200+D3) 600- MG-MG-UNIT tablet sustained-release 24 hour Take 1 tablet by mouth Every Morning. HOLD PRIOR TO SURGERY   • dorzolamide-timolol (COSOPT) 22.3-6.8 MG/ML ophthalmic solution Administer 1 drop to both eyes 2 (Two) Times a Day.   • fluticasone (FLONASE) 50 MCG/ACT nasal spray USE 2 SPRAYS IN EACH NOSTRIL EVERY DAY AS DIRECTED BY PROVIDER (Patient taking differently: 1 spray into the nostril(s) as directed by provider Daily.)   • Multiple Vitamins-Minerals (MULTIVITAMIN ADULT PO) Take 1 tablet by mouth Every Morning. HOLD PRIOR TO SURGERY   • Netarsudil-Latanoprost (ROCKLATAN) 0.02-0.005 % solution Administer 1 drop to both eyes Every Night.     Current Medications:  Scheduled Meds:  Scopolamine 1 patch Transdermal Once   sodium chloride 10 mL Intravenous Q12H     Continuous Infusions:  lactated ringers 9 mL/hr Last Rate: 9 mL/hr (07/21/20 0628)     PRN Meds:.•  fentanyl  •  lidocaine PF 1%  •  sodium chloride    Past Medical History:   Diagnosis Date   • Brittle bones    • Glaucoma    • Graves disease    • Graves disease    • History of transfusion    • Hyperlipidemia    • Hypertension    •  Osteoporosis    • Overactive bladder    • PONV (postoperative nausea and vomiting)     SEVERE   • Slow to wake up after anesthesia         Past Surgical History:   Procedure Laterality Date   • COLONOSCOPY     • ECTROPION REPAIR Right 1/17/2020    Procedure: RIGHT EXTERNAL ETHMOIDECTOMY, RIGHT LOWER LID RETRACTION REPAIR WITH EYE BANK SCLERA;  Surgeon: Negrito Dennis MD;  Location: [facility];  Service: Ophthalmology   • FEMUR FRACTURE SURGERY Left    • HIP FRACTURE SURGERY Bilateral     pin & plates in left hip; pin & plate removed right   • ORBITAL DECOMPRESSION Right 1/17/2020    Procedure: RIGHT ORBITAL DECOMPRESSION;  Surgeon: Negrito Dennis MD;  Location: [facility];  Service: Ophthalmology   • ORBITAL DECOMPRESSION Left 3/12/2020    Procedure: LEFT ORBITAL DECOMPRESSION, LEFT EXTERNAL ETHMOIDECTOMY, LEFT LOWER LID RETRACTION REPAIR WITH EYE BANK SCLERA;  Surgeon: Negrito Dennis MD;  Location: [facility];  Service: Ophthalmology;  Laterality: Left;   • TOTAL THYROIDECTOMY          Social History     Occupational History   • Not on file   Tobacco Use   • Smoking status: Never Smoker   • Smokeless tobacco: Never Used   Substance and Sexual Activity   • Alcohol use: Yes     Comment: HOLIDAY DRINKER   • Drug use: Never   • Sexual activity: Defer    Social History     Social History Narrative   • Not on file        Family History   Problem Relation Age of Onset   • Hypertension Mother    • Heart disease Father    • Alcohol abuse Brother    • Malig Hyperthermia Neg Hx            Physical Exam   Constitutional: Alert, cooperative, in no acute distress    Head: Normocephalic.   Eyes:   Bilateral epiphora and nld stenosis  Neck: Normal range of motion.   Cardiovascular: Normal rate.    Pulmonary/Chest: Effort normal.   Neurological: Alert.   Skin: Skin is warm.   Psychiatric: Normal mood and affect.       Assessment/Plan:  The patient voiced understanding of the risks, benefits, and  alternative forms of treatment that were discussed and the patient consents to proceed with POSSIBLE RIGHT DACRYOCYSTORHINOSTOMY LEFT PROBE IRRIGATION WITH CRAWORD TUBES.       Zeenat Up MD

## 2020-07-21 NOTE — ANESTHESIA PREPROCEDURE EVALUATION
Anesthesia Evaluation     Patient summary reviewed and Nursing notes reviewed   history of anesthetic complications (severe): PONV prolonged sedation  NPO Solid Status: > 8 hours  NPO Liquid Status: > 2 hours           Airway   Mallampati: I  TM distance: >3 FB  Neck ROM: full  Possible difficult intubation, Small opening and Narrow palate  Dental    (+) partials        Pulmonary - normal exam   (-) not a smoker  Cardiovascular - normal exam  Exercise tolerance: excellent (>7 METS)    ECG reviewed    (+) hypertension, hyperlipidemia,   (-) past MI, angina, PND, COATS      Neuro/Psych  GI/Hepatic/Renal/Endo      Musculoskeletal     Abdominal  - normal exam   Substance History      OB/GYN          Other   arthritis,          Phys Exam Other: Previous grade I view, easy mask with OAI                Anesthesia Plan    ASA 3   (I have reviewed the patient's history with the patient and the chart, including all pertinent laboratory results and imaging. I have explained the risks of anesthesia including but not limited to dental damage, corneal abrasion, nerve injury, MI, stroke, and death.    subhypnotic propofol infusion for PONV, severe  Patient request scop patch as well, did well with it last time)  intravenous induction     Anesthetic plan, all risks, benefits, and alternatives have been provided, discussed and informed consent has been obtained with: patient.    Plan discussed with CRNA.

## 2020-07-21 NOTE — ANESTHESIA POSTPROCEDURE EVALUATION
Patient: Jasmine BEAR Banegas    Procedure Summary     Date:  07/21/20 Room / Location:   [facility]  [facility]    Anesthesia Start:  0652 Anesthesia Stop:  0832    Procedures:       RIGHT DACRYOCYSTORHINOSTOMY BILATERAL  PROBE IRRIGATION WITH KAMARA TUBES (Bilateral Eye)      WITH KAMARA TUBES (Bilateral Eye) Diagnosis:      Surgeon:  Negrito Dennis MD Provider:  Lisa Pedroza MD    Anesthesia Type:  Not recorded ASA Status:  3          Anesthesia Type: No value filed.    Vitals  Vitals Value Taken Time   /73 7/21/2020  9:30 AM   Temp 36.4 °C (97.6 °F) 7/21/2020  9:30 AM   Pulse 56 7/21/2020  9:30 AM   Resp 16 7/21/2020  9:30 AM   SpO2 97 % 7/21/2020  9:31 AM   Vitals shown include unvalidated device data.        Post Anesthesia Care and Evaluation    Patient location during evaluation: bedside  Patient participation: complete - patient participated  Level of consciousness: awake and alert  Pain management: adequate  Airway patency: patent  Anesthetic complications: No anesthetic complications  PONV Status: controlled  Cardiovascular status: acceptable  Respiratory status: acceptable  Hydration status: acceptable

## 2020-07-21 NOTE — OP NOTE
OPERATIVE NOTE    Patient Identification:  Name: Jasmine Banegas  Age: 74 y.o.  Sex: female  :  1945  MRN: [id number]                                               Preoperative diagnosis: 1.  Bilateral nasolacrimal duct obstruction  Postoperative diagnosis: same  Procedure: 1.  Right dacryocystorhinostomy 2.  Left nasolacrimal duct probing and irrigation with placement of Patel tubes  Surgeon: Dr. Negrito Dennis  who was present and scrubbed throughout all critical portions of the operation  Assistants: Zeenat Up MD and Reese Garcia MD  Anesthesia: General  EBL: less than 50cc  Specimens: * No orders in the log *    Description of the procedure:     The patient was taken to the operating room and placed on the table in the supine position, where anesthesia was induced. 2% lidocaine with epinephrine and 0.5% marcaine in a 1:1 fashion was injected over the surgical site, and the patient was prepped and draped in the usual manner for orbitofacial surgery.     Corneal protectors were placed in both eyes. Afrin soaked cottonoids were placed in the ipsilateral nasal vault.    A 15 Bard-Jesus blade incision was made over the RIght anterior lacrimal crest. Sharp dissection was carried down to the lacrimal crest periosteum. The periosteum was elevated with a Tenzel periosteal elevator. A rhinostomy was created through the lacrimal sac fossa with the elevator. The rhinostomy was enlarged to a dimension of 10 mm x 15 mm with Kerrison rongeurs. The nasal mucosa was opened vertically with electrocauterization. The lacrimal sac was opened vertically with sharp dissection. Purulent material was irrigated from the lacrimal sac. The posterior flap of lacrimal sac was excised. Patel tubing was inserted through the upper and lower canaliculi and brought through the rhinostomy and into the nose, where it was tied in a 3 half knots. The anterior flap and medial canthal tendon was incorporated into a skin closure  with 5-0 Vicryl suture.  The skin was closed with 5-0 fast absorbing suture.     Attention was then turned to the Left side. The upper and lower puncti were dilated with a [place] dilator. The lacrimal outflow system was probed and found to be obstructed at the distal segment. The obstruction was passed with gentle pressure until the probe entered the nose under the inferior turbinate. Patel tubing was inserted through the upper and lower canaliculi, through the nasal lacrimal duct, and into the nose, where they were tied in 3 half knots.    The corneal protectors were removed and antibiotic ophthalmic ointment was placed over the surgical site.     The patient was then awakened and taken from the operating room in good condition, having tolerated the procedure well. There were no complications, and the estimated blood loss was less than 50 cc.

## 2020-07-21 NOTE — ANESTHESIA PROCEDURE NOTES
Airway  Urgency: elective    Date/Time: 7/21/2020 6:59 AM  Airway not difficult    General Information and Staff    Patient location during procedure: OR  Anesthesiologist: Lisa Pedroza MD  CRNA: Agnieszka Gill CRNA    Indications and Patient Condition  Indications for airway management: airway protection    Preoxygenated: yes  Mask difficulty assessment: 1 - vent by mask    Final Airway Details  Final airway type: endotracheal airway      Successful airway: ETT  Cuffed: yes   Successful intubation technique: direct laryngoscopy  Endotracheal tube insertion site: oral  Blade: Silvestre  Blade size: 3  ETT size (mm): 7.0  Cormack-Lehane Classification: grade I - full view of glottis  Placement verified by: chest auscultation and capnometry   Measured from: lips  ETT/EBT  to lips (cm): 21  Number of attempts at approach: 1  Assessment: lips, teeth, and gum same as pre-op and atraumatic intubation    Additional Comments  Smooth IV/mask induction/intubation. Easy bag-mask ventilation. Orally intubated, easy, atraumatic, lips/teeth/mouth left intact, as preop. Direct visual of vocal cords. +ETCO2, bilateral breath sounds and equal.

## 2020-07-21 NOTE — ANESTHESIA POSTPROCEDURE EVALUATION
Patient: Jasmine BEAR Banegas    Procedure Summary     Date:  07/21/20 Room / Location:   [facility]    Anesthesia Start:  0652 Anesthesia Stop:  0832    Procedures:       RIGHT DACRYOCYSTORHINOSTOMY BILATERAL  PROBE IRRIGATION WITH KAMARA TUBES (Bilateral Eye)      WITH KAMARA TUBES (Bilateral Eye) Diagnosis:      Surgeon:  Negrito Dennis MD Provider:  Lisa Pedroza MD    Anesthesia Type:  Not recorded ASA Status:  3          Anesthesia Type: No value filed.    Vitals  Vitals Value Taken Time   /73 7/21/2020  9:30 AM   Temp 36.4 °C (97.6 °F) 7/21/2020  9:30 AM   Pulse 56 7/21/2020  9:30 AM   Resp 16 7/21/2020  9:30 AM   SpO2 97 % 7/21/2020  9:31 AM   Vitals shown include unvalidated device data.        Post Anesthesia Care and Evaluation    Patient location during evaluation: bedside  Patient participation: complete - patient participated  Level of consciousness: awake  Pain score: 1  Pain management: adequate  Airway patency: patent  Anesthetic complications: No anesthetic complications  PONV Status: controlled  Cardiovascular status: acceptable  Respiratory status: acceptable  Hydration status: acceptable    Comments: --------------------            07/21/20               0943     --------------------   BP:                  Pulse:               Resp:                Temp:                SpO2:    (!) 86%    --------------------

## 2020-08-07 RX ORDER — FLUTICASONE PROPIONATE 50 MCG
SPRAY, SUSPENSION (ML) NASAL
Qty: 16 G | Refills: 0 | Status: SHIPPED | OUTPATIENT
Start: 2020-08-07 | End: 2020-11-03

## 2020-11-03 RX ORDER — FLUTICASONE PROPIONATE 50 MCG
SPRAY, SUSPENSION (ML) NASAL
Qty: 16 G | Refills: 0 | Status: SHIPPED | OUTPATIENT
Start: 2020-11-03 | End: 2021-02-15

## 2021-01-12 DIAGNOSIS — E78.5 HYPERLIPIDEMIA, UNSPECIFIED HYPERLIPIDEMIA TYPE: Primary | ICD-10-CM

## 2021-01-12 DIAGNOSIS — E03.9 ADULT HYPOTHYROIDISM: ICD-10-CM

## 2021-01-12 DIAGNOSIS — I10 ESSENTIAL HYPERTENSION: ICD-10-CM

## 2021-01-13 LAB
ALBUMIN SERPL-MCNC: 4.4 G/DL (ref 3.5–5.2)
ALBUMIN/GLOB SERPL: 1.9 G/DL
ALP SERPL-CCNC: 63 U/L (ref 39–117)
ALT SERPL-CCNC: 12 U/L (ref 1–33)
AST SERPL-CCNC: 21 U/L (ref 1–32)
BASOPHILS # BLD AUTO: 0.06 10*3/MM3 (ref 0–0.2)
BASOPHILS NFR BLD AUTO: 1 % (ref 0–1.5)
BILIRUB SERPL-MCNC: 0.4 MG/DL (ref 0–1.2)
BUN SERPL-MCNC: 12 MG/DL (ref 8–23)
BUN/CREAT SERPL: 17.6 (ref 7–25)
CALCIUM SERPL-MCNC: 9.4 MG/DL (ref 8.6–10.5)
CHLORIDE SERPL-SCNC: 100 MMOL/L (ref 98–107)
CHOLEST SERPL-MCNC: 154 MG/DL (ref 0–200)
CO2 SERPL-SCNC: 29.4 MMOL/L (ref 22–29)
CREAT SERPL-MCNC: 0.68 MG/DL (ref 0.57–1)
EOSINOPHIL # BLD AUTO: 0.11 10*3/MM3 (ref 0–0.4)
EOSINOPHIL NFR BLD AUTO: 1.9 % (ref 0.3–6.2)
ERYTHROCYTE [DISTWIDTH] IN BLOOD BY AUTOMATED COUNT: 13.5 % (ref 12.3–15.4)
GLOBULIN SER CALC-MCNC: 2.3 GM/DL
GLUCOSE SERPL-MCNC: 87 MG/DL (ref 65–99)
HCT VFR BLD AUTO: 36.8 % (ref 34–46.6)
HDLC SERPL-MCNC: 62 MG/DL (ref 40–60)
HGB BLD-MCNC: 12.5 G/DL (ref 12–15.9)
IMM GRANULOCYTES # BLD AUTO: 0.03 10*3/MM3 (ref 0–0.05)
IMM GRANULOCYTES NFR BLD AUTO: 0.5 % (ref 0–0.5)
LDLC SERPL CALC-MCNC: 83 MG/DL (ref 0–100)
LYMPHOCYTES # BLD AUTO: 1.71 10*3/MM3 (ref 0.7–3.1)
LYMPHOCYTES NFR BLD AUTO: 29.6 % (ref 19.6–45.3)
MCH RBC QN AUTO: 30.1 PG (ref 26.6–33)
MCHC RBC AUTO-ENTMCNC: 34 G/DL (ref 31.5–35.7)
MCV RBC AUTO: 88.7 FL (ref 79–97)
MONOCYTES # BLD AUTO: 0.47 10*3/MM3 (ref 0.1–0.9)
MONOCYTES NFR BLD AUTO: 8.1 % (ref 5–12)
NEUTROPHILS # BLD AUTO: 3.4 10*3/MM3 (ref 1.7–7)
NEUTROPHILS NFR BLD AUTO: 58.9 % (ref 42.7–76)
NRBC BLD AUTO-RTO: 0 /100 WBC (ref 0–0.2)
PLATELET # BLD AUTO: 223 10*3/MM3 (ref 140–450)
POTASSIUM SERPL-SCNC: 4.5 MMOL/L (ref 3.5–5.2)
PROT SERPL-MCNC: 6.7 G/DL (ref 6–8.5)
RBC # BLD AUTO: 4.15 10*6/MM3 (ref 3.77–5.28)
SODIUM SERPL-SCNC: 137 MMOL/L (ref 136–145)
T4 FREE SERPL-MCNC: 1.6 NG/DL (ref 0.93–1.7)
TRIGL SERPL-MCNC: 42 MG/DL (ref 0–150)
TSH SERPL DL<=0.005 MIU/L-ACNC: 0.35 UIU/ML (ref 0.27–4.2)
VLDLC SERPL CALC-MCNC: 9 MG/DL (ref 5–40)
WBC # BLD AUTO: 5.78 10*3/MM3 (ref 3.4–10.8)

## 2021-01-19 ENCOUNTER — OFFICE VISIT (OUTPATIENT)
Dept: INTERNAL MEDICINE | Facility: CLINIC | Age: 76
End: 2021-01-19

## 2021-01-19 VITALS
WEIGHT: 108 LBS | SYSTOLIC BLOOD PRESSURE: 142 MMHG | DIASTOLIC BLOOD PRESSURE: 70 MMHG | TEMPERATURE: 97.3 F | OXYGEN SATURATION: 98 % | RESPIRATION RATE: 16 BRPM | HEIGHT: 63 IN | HEART RATE: 68 BPM | BODY MASS INDEX: 19.14 KG/M2

## 2021-01-19 DIAGNOSIS — E03.9 ADULT HYPOTHYROIDISM: ICD-10-CM

## 2021-01-19 DIAGNOSIS — I10 ESSENTIAL HYPERTENSION: Primary | ICD-10-CM

## 2021-01-19 DIAGNOSIS — Z12.31 ENCOUNTER FOR SCREENING MAMMOGRAM FOR BREAST CANCER: ICD-10-CM

## 2021-01-19 DIAGNOSIS — M81.0 OSTEOPOROSIS, UNSPECIFIED OSTEOPOROSIS TYPE, UNSPECIFIED PATHOLOGICAL FRACTURE PRESENCE: ICD-10-CM

## 2021-01-19 DIAGNOSIS — E78.5 HYPERLIPIDEMIA, UNSPECIFIED HYPERLIPIDEMIA TYPE: ICD-10-CM

## 2021-01-19 DIAGNOSIS — Z12.11 COLON CANCER SCREENING: ICD-10-CM

## 2021-01-19 PROBLEM — H40.9 GLAUCOMA: Status: ACTIVE | Noted: 2021-01-19

## 2021-01-19 PROCEDURE — 99214 OFFICE O/P EST MOD 30 MIN: CPT | Performed by: INTERNAL MEDICINE

## 2021-01-19 NOTE — PATIENT INSTRUCTIONS
Ways to sign up.  If no availability, keep checking back.      [email or web address]    [email or web address]    [email or web address]    [email or web address] (sign to be notified about shot availability)

## 2021-01-19 NOTE — PROGRESS NOTES
Soraya Banegas is a 75 y.o. female who presents with   Chief Complaint   Patient presents with   • Hyperlipidemia   • Hypertension       History of Present Illness     The following data was reviewed by: Chanel Duncan MD on 01/19/2021:  CMP    CMP 3/5/20 7/7/20 1/13/21   Glucose  94 87   BUN 13 11 12   Creatinine 0.61 0.68 0.68   eGFR Non  Am 96 85 85   eGFR African Am  103 103   Sodium 140 135 (A) 137   Potassium 3.9 4.3 4.5   Chloride 103 100 100   Calcium 9.1 8.9 9.4   Total Protein  6.5 6.7   Albumin  4.40 4.40   Globulin  2.1 2.3   Total Bilirubin  0.6 0.4   Alkaline Phosphatase  43 63   AST (SGOT)  23 21   ALT (SGPT)  15 12   (A) Abnormal value       Comments are available for some flowsheets but are not being displayed.           CBC    CBC 3/5/20 7/7/20 1/13/21   WBC 6.40 5.38 5.78   RBC 4.22 4.26 4.15   Hemoglobin 12.7 12.5 12.5   Hematocrit 38.5 37.1 36.8   MCV 91.2 87.1 88.7   MCH 30.1 29.3 30.1   MCHC 33.0 33.7 34.0   RDW 13.3 15.1 13.5   Platelets 188 200 223           Lipid Panel    Lipid Panel 7/7/20 1/13/21   Total Cholesterol 149 154   Triglycerides 47 42   HDL Cholesterol 57 62 (A)   VLDL Cholesterol 9.4 9   LDL Cholesterol  83 83   (A) Abnormal value       Comments are available for some flowsheets but are not being displayed.           TSH    TSH 7/7/20 1/13/21   TSH 0.304 0.347           HTN.  Control is borderline today.  HLD.  Control is excellent.   Hypothyroidism.  Control is good.     Review of Systems   Constitutional: Negative for fever.   Respiratory: Negative.    Cardiovascular: Negative.        The following portions of the patient's history were reviewed and updated as appropriate: allergies, current medications and problem list.    Patient Active Problem List    Diagnosis Date Noted   • Graves' eye disease 07/14/2020   • Osteoarthritis of knee 03/19/2018   • Glaucoma of both eyes 03/14/2017   • Overactive bladder 03/07/2016   • HLD (hyperlipidemia)  02/25/2016     Note Last Updated: 2/25/2016     Description: Statins started in 2/2014 secondary to 7.6% 10 year ASCVD risk.     • Adult hypothyroidism 02/25/2016   • OP (osteoporosis) 02/25/2016     Note Last Updated: 2/25/2016     Description: Fosamax 1806-4630  Prolia started in 2014. Recommend 1200mg calcium and 1000IUs D in diet/supplements.     • Essential hypertension 02/25/2016     Note Last Updated: 2/25/2016     Impression: 02/12/2014 - Blood pressure is well-controlled when not in the office.  I do encourage to check regularly.;          Current Outpatient Medications on File Prior to Visit   Medication Sig Dispense Refill   • brimonidine (ALPHAGAN) 0.2 % ophthalmic solution Administer 1 drop to both eyes 2 (Two) Times a Day.     • Calcium-Magnesium-Vitamin D ER (CALCIUM 1200+D3) 600- MG-MG-UNIT tablet sustained-release 24 hour Take 1 tablet by mouth Every Morning. HOLD PRIOR TO SURGERY     • dorzolamide-timolol (COSOPT) 22.3-6.8 MG/ML ophthalmic solution Administer 1 drop to both eyes 2 (Two) Times a Day.  99   • erythromycin (ROMYCIN) 5 MG/GM ophthalmic ointment Administer  to both eyes 2 (two) times a day. Apply to incision 2x/day 3.5 g 1   • fluticasone (FLONASE) 50 MCG/ACT nasal spray USE 2 SPRAYS IN EACH NOSTRIL EVERY DAY AS DIRECTED BY PROVIDER 16 g 0   • levothyroxine (SYNTHROID, LEVOTHROID) 75 MCG tablet Take 1 tablet by mouth Daily. 90 tablet 3   • lisinopril (PRINIVIL,ZESTRIL) 10 MG tablet Take 1 tablet by mouth Daily. 90 tablet 3   • montelukast (SINGULAIR) 10 MG tablet Take 1 tablet by mouth Daily. (Patient taking differently: Take 10 mg by mouth Every Night.) 90 tablet 3   • Netarsudil-Latanoprost (ROCKLATAN) 0.02-0.005 % solution Administer 1 drop to both eyes Every Night.     • simvastatin (ZOCOR) 20 MG tablet Take 1 tablet by mouth every night at bedtime. 90 tablet 3     No current facility-administered medications on file prior to visit.        Objective     /70   Pulse 68   " Temp 97.3 °F (36.3 °C) (Temporal)   Resp 16   Ht 160 cm (63\")   Wt 49 kg (108 lb)   SpO2 98%   BMI 19.13 kg/m²     Physical Exam  Constitutional:       Appearance: She is well-developed.   HENT:      Head: Normocephalic and atraumatic.   Cardiovascular:      Rate and Rhythm: Normal rate and regular rhythm.      Heart sounds: Normal heart sounds.   Pulmonary:      Effort: Pulmonary effort is normal.      Breath sounds: Normal breath sounds.   Skin:     General: Skin is warm and dry.   Neurological:      Mental Status: She is alert and oriented to person, place, and time.   Psychiatric:         Behavior: Behavior normal.         Assessment/Plan   Diagnoses and all orders for this visit:    1. Essential hypertension (Primary)    2. Hyperlipidemia, unspecified hyperlipidemia type    3. Adult hypothyroidism    4. Osteoporosis, unspecified osteoporosis type, unspecified pathological fracture presence  -     DEXA Bone Density Axial    5. Encounter for screening mammogram for breast cancer  -     Mammo Screening Digital Tomosynthesis Bilateral With CAD    6. Colon cancer screening  -     Ambulatory Referral For Screening Colonoscopy        Discussion    HTN.  Continue current.  The patient is instructed to monitor at home and call in checks.    HLD.  The patient is advised to continue current dosage of simvastatin.   Hypothyroidism.  The patient is advised to continue current dosage of levothyroxine.    She is due for colonoscopy, mammogram and DEXA.  Orders are placed.             No future appointments.      "

## 2021-02-15 RX ORDER — FLUTICASONE PROPIONATE 50 MCG
SPRAY, SUSPENSION (ML) NASAL
Qty: 16 G | Refills: 11 | Status: SHIPPED | OUTPATIENT
Start: 2021-02-15 | End: 2022-04-01

## 2021-03-02 DIAGNOSIS — Z23 IMMUNIZATION DUE: ICD-10-CM

## 2021-03-10 ENCOUNTER — APPOINTMENT (OUTPATIENT)
Dept: PREADMISSION TESTING | Facility: HOSPITAL | Age: 76
End: 2021-03-10

## 2021-03-10 VITALS
WEIGHT: 108 LBS | TEMPERATURE: 98.2 F | HEIGHT: 60 IN | HEART RATE: 63 BPM | SYSTOLIC BLOOD PRESSURE: 165 MMHG | RESPIRATION RATE: 16 BRPM | OXYGEN SATURATION: 97 % | BODY MASS INDEX: 21.2 KG/M2 | DIASTOLIC BLOOD PRESSURE: 67 MMHG

## 2021-03-10 LAB
ANION GAP SERPL CALCULATED.3IONS-SCNC: 8.6 MMOL/L (ref 5–15)
BUN SERPL-MCNC: 13 MG/DL (ref 8–23)
BUN/CREAT SERPL: 24.1 (ref 7–25)
CALCIUM SPEC-SCNC: 9.1 MG/DL (ref 8.6–10.5)
CHLORIDE SERPL-SCNC: 102 MMOL/L (ref 98–107)
CO2 SERPL-SCNC: 27.4 MMOL/L (ref 22–29)
CREAT SERPL-MCNC: 0.54 MG/DL (ref 0.57–1)
DEPRECATED RDW RBC AUTO: 43.8 FL (ref 37–54)
ERYTHROCYTE [DISTWIDTH] IN BLOOD BY AUTOMATED COUNT: 13.6 % (ref 12.3–15.4)
GFR SERPL CREATININE-BSD FRML MDRD: 110 ML/MIN/1.73
GLUCOSE SERPL-MCNC: 81 MG/DL (ref 65–99)
HCT VFR BLD AUTO: 38.5 % (ref 34–46.6)
HGB BLD-MCNC: 12.6 G/DL (ref 12–15.9)
MCH RBC QN AUTO: 28.9 PG (ref 26.6–33)
MCHC RBC AUTO-ENTMCNC: 32.7 G/DL (ref 31.5–35.7)
MCV RBC AUTO: 88.3 FL (ref 79–97)
PLATELET # BLD AUTO: 178 10*3/MM3 (ref 140–450)
PMV BLD AUTO: 10.9 FL (ref 6–12)
POTASSIUM SERPL-SCNC: 4.1 MMOL/L (ref 3.5–5.2)
QT INTERVAL: 431 MS
RBC # BLD AUTO: 4.36 10*6/MM3 (ref 3.77–5.28)
SODIUM SERPL-SCNC: 138 MMOL/L (ref 136–145)
WBC # BLD AUTO: 4.91 10*3/MM3 (ref 3.4–10.8)

## 2021-03-10 PROCEDURE — U0004 COV-19 TEST NON-CDC HGH THRU: HCPCS

## 2021-03-10 PROCEDURE — 36415 COLL VENOUS BLD VENIPUNCTURE: CPT

## 2021-03-10 PROCEDURE — C9803 HOPD COVID-19 SPEC COLLECT: HCPCS

## 2021-03-10 PROCEDURE — 93005 ELECTROCARDIOGRAM TRACING: CPT

## 2021-03-10 PROCEDURE — 93010 ELECTROCARDIOGRAM REPORT: CPT | Performed by: INTERNAL MEDICINE

## 2021-03-10 PROCEDURE — 85027 COMPLETE CBC AUTOMATED: CPT

## 2021-03-10 PROCEDURE — 80048 BASIC METABOLIC PNL TOTAL CA: CPT

## 2021-03-10 RX ORDER — LATANOPROST 50 UG/ML
1 SOLUTION/ DROPS OPHTHALMIC NIGHTLY
COMMUNITY

## 2021-03-10 RX ORDER — GLIMEPIRIDE 2 MG/1
1 TABLET ORAL 2 TIMES DAILY
COMMUNITY
End: 2022-08-24

## 2021-03-10 RX ORDER — CHLORAL HYDRATE 500 MG
1000 CAPSULE ORAL
COMMUNITY
End: 2021-03-12 | Stop reason: HOSPADM

## 2021-03-10 NOTE — DISCHARGE INSTRUCTIONS
Take the following medications the morning of surgery:    LEVOTHYROXINE    ARRIVE AT 7:30    If you are on prescription narcotic pain medication to control your pain you may also take that medication the morning of surgery.    General Instructions:  • Do not eat solid food after midnight the night before surgery.  • You may drink clear liquids day of surgery but must stop at least one hour before your hospital arrival time.  • It is beneficial for you to have a clear drink that contains carbohydrates the day of surgery.  We suggest a 12 to 20 ounce bottle of Gatorade or Powerade for non-diabetic patients or a 12 to 20 ounce bottle of G2 or Powerade Zero for diabetic patients. (Pediatric patients, are not advised to drink a 12 to 20 ounce carbohydrate drink)    Clear liquids are liquids you can see through.  Nothing red in color.     Plain water                               Sports drinks  Sodas                                   Gelatin (Jell-O)  Fruit juices without pulp such as white grape juice and apple juice  Popsicles that contain no fruit or yogurt  Tea or coffee (no cream or milk added)  Gatorade / Powerade  G2 / Powerade Zero    • Infants may have breast milk up to four hours before surgery.  • Infants drinking formula may drink formula up to six hours before surgery.   • Patients who avoid smoking, chewing tobacco and alcohol for 4 weeks prior to surgery have a reduced risk of post-operative complications.  Quit smoking as many days before surgery as you can.  • Do not smoke, use chewing tobacco or drink alcohol the day of surgery.   • If applicable bring your C-PAP/ BI-PAP machine.  • Bring any papers given to you in the doctor’s office.  • Wear clean comfortable clothes.  • Do not wear contact lenses, false eyelashes or make-up.  Bring a case for your glasses.   • Bring crutches or walker if applicable.  • Remove all piercings.  Leave jewelry and any other valuables at home.  • Hair extensions with metal  clips must be removed prior to surgery.  • The Pre-Admission Testing nurse will instruct you to bring medications if unable to obtain an accurate list in Pre-Admission Testing.        If you were given a blood bank ID arm band remember to bring it with you the day of surgery.    Preventing a Surgical Site Infection:  • For 2 to 3 days before surgery, avoid shaving with a razor because the razor can irritate skin and make it easier to develop an infection.    • Any areas of open skin can increase the risk of a post-operative wound infection by allowing bacteria to enter and travel throughout the body.  Notify your surgeon if you have any skin wounds / rashes even if it is not near the expected surgical site.  The area will need assessed to determine if surgery should be delayed until it is healed.  • The night prior to surgery shower using a fresh bar of anti-bacterial soap (such as Dial) and clean washcloth.  Sleep in a clean bed with clean clothing.  Do not allow pets to sleep with you.  • Shower on the morning of surgery using a fresh bar of anti-bacterial soap (such as Dial) and clean washcloth.  Dry with a clean towel and dress in clean clothing.  • Ask your surgeon if you will be receiving antibiotics prior to surgery.  • Make sure you, your family, and all healthcare providers clean their hands with soap and water or an alcohol based hand  before caring for you or your wound.    Day of surgery:  Your arrival time is approximately two hours before your scheduled surgery time.  Upon arrival, a Pre-op nurse and Anesthesiologist will review your health history, obtain vital signs, and answer questions you may have.  The only belongings needed at this time will be a list of your home medications and if applicable your C-PAP/BI-PAP machine.  A Pre-op nurse will start an IV and you may receive medication in preparation for surgery, including something to help you relax.     Please be aware that surgery does  come with discomfort.  We want to make every effort to control your discomfort so please discuss any uncontrolled symptoms with your nurse.   Your doctor will most likely have prescribed pain medications.      If you are going home after surgery you will receive individualized written care instructions before being discharged.  A responsible adult must drive you to and from the hospital on the day of your surgery and stay with you for 24 hours.  Discharge prescriptions can be filled by the hospital pharmacy during regular pharmacy hours.  If you are having surgery late in the day/evening your prescription may be e-prescribed to your pharmacy.  Please verify your pharmacy hours or chose a 24 hour pharmacy to avoid not having access to your prescription because your pharmacy has closed for the day.    If you are staying overnight following surgery, you will be transported to your hospital room following the recovery period.  [facility] has all private rooms.    If you have any questions please call Pre-Admission Testing at [phone].  Deductibles and co-payments are collected on the day of service. Please be prepared to pay the required co-pay, deductible or deposit on the day of service as defined by your plan.    Patient Education for Self-Quarantine Process    Following your COVID testing, we strongly recommend that you do not leave your home after you have been tested for COVID except to get medical care. This includes not going to work, school or to public areas.  If this is not possible for you to do please limit your activities to only required outings.  Be sure to wear a mask when you are with other people, practice social distancing and wash your hands frequently.      The following items provide additional details to keep you safe.  • Wash your hands with soap and water frequently for at least 20 seconds.   • Avoid touching your eyes, nose and mouth with unwashed hands.  • Do not share  anything - utensils, towels, food from the same bowl.   • Have your own utensils, drinking glass, dishes, towels and bedding.   • Do not have visitors.   • Do use FaceTime to stay in touch with family and friends.  • You should stay in a specific room away from others if possible.   • Stay at least 6 feet away from others in the home if you cannot have a dedicated room to yourself.   • Do not snuggle with your pet. While the CDC says there is no evidence that pets can spread COVID-19 or be infected from humans, it is probably best to avoid “petting, snuggling, being kissed or licked and sharing food (during self-quarantine)”, according to the CDC.   • Sanitize household surfaces daily. Include all high touch areas (door handles, light switches, phones, countertops, etc.)  • Do not share a bathroom with others, if possible.   • Wear a mask around others in your home if you are unable to stay in a separate room or 6 feet apart. If  you are unable to wear a mask, have your family member wear a mask if they must be within 6 feet of you.   Call your surgeon immediately if you experience any of the following symptoms:  • Sore Throat  • Shortness of Breath or difficulty breathing  • Cough  • Chills  • Body soreness or muscle pain  • Headache  • Fever  • New loss of taste or smell  • Do not arrive for your surgery ill.  Your procedure will need to be rescheduled to another time.  You will need to call your physician before the day of surgery to avoid any unnecessary exposure to hospital staff as well as other patients.

## 2021-03-11 LAB — SARS-COV-2 RNA RESP QL NAA+PROBE: NOT DETECTED

## 2021-03-12 ENCOUNTER — ANESTHESIA (OUTPATIENT)
Dept: PERIOP | Facility: HOSPITAL | Age: 76
End: 2021-03-12

## 2021-03-12 ENCOUNTER — ANESTHESIA EVENT (OUTPATIENT)
Dept: PERIOP | Facility: HOSPITAL | Age: 76
End: 2021-03-12

## 2021-03-12 ENCOUNTER — HOSPITAL ENCOUNTER (OUTPATIENT)
Facility: HOSPITAL | Age: 76
Setting detail: HOSPITAL OUTPATIENT SURGERY
Discharge: HOME | End: 2021-03-12
Attending: OPHTHALMOLOGY | Admitting: OPHTHALMOLOGY

## 2021-03-12 VITALS
HEART RATE: 70 BPM | DIASTOLIC BLOOD PRESSURE: 80 MMHG | TEMPERATURE: 97.8 F | RESPIRATION RATE: 16 BRPM | SYSTOLIC BLOOD PRESSURE: 150 MMHG | OXYGEN SATURATION: 96 %

## 2021-03-12 PROCEDURE — 25010000002 NEOSTIGMINE PER 0.5 MG: Performed by: NURSE ANESTHETIST, CERTIFIED REGISTERED

## 2021-03-12 PROCEDURE — 25010000002 ONDANSETRON PER 1 MG: Performed by: NURSE ANESTHETIST, CERTIFIED REGISTERED

## 2021-03-12 PROCEDURE — 25010000002 DEXAMETHASONE PER 1 MG: Performed by: NURSE ANESTHETIST, CERTIFIED REGISTERED

## 2021-03-12 PROCEDURE — 25010000002 FENTANYL CITRATE (PF) 100 MCG/2ML SOLUTION: Performed by: NURSE ANESTHETIST, CERTIFIED REGISTERED

## 2021-03-12 PROCEDURE — 25010000002 PHENYLEPHRINE PER 1 ML: Performed by: NURSE ANESTHETIST, CERTIFIED REGISTERED

## 2021-03-12 PROCEDURE — A4263 PERMANENT TEAR DUCT PLUG: HCPCS | Performed by: OPHTHALMOLOGY

## 2021-03-12 PROCEDURE — 25010000002 PROPOFOL 10 MG/ML EMULSION: Performed by: NURSE ANESTHETIST, CERTIFIED REGISTERED

## 2021-03-12 PROCEDURE — 25010000002 HYDROMORPHONE PER 4 MG: Performed by: NURSE ANESTHETIST, CERTIFIED REGISTERED

## 2021-03-12 DEVICE — LACRIMAL INTUBATION SET CRAWFORD
Type: IMPLANTABLE DEVICE | Site: LACRIMAL DUCT | Status: FUNCTIONAL
Brand: CRAWFORD

## 2021-03-12 RX ORDER — MAGNESIUM HYDROXIDE 1200 MG/15ML
LIQUID ORAL AS NEEDED
Status: DISCONTINUED | OUTPATIENT
Start: 2021-03-12 | End: 2021-03-12 | Stop reason: HOSPADM

## 2021-03-12 RX ORDER — SODIUM CHLORIDE 0.9 % (FLUSH) 0.9 %
3-10 SYRINGE (ML) INJECTION AS NEEDED
Status: DISCONTINUED | OUTPATIENT
Start: 2021-03-12 | End: 2021-03-12 | Stop reason: HOSPADM

## 2021-03-12 RX ORDER — LABETALOL HYDROCHLORIDE 5 MG/ML
5 INJECTION, SOLUTION INTRAVENOUS
Status: DISCONTINUED | OUTPATIENT
Start: 2021-03-12 | End: 2021-03-12 | Stop reason: HOSPADM

## 2021-03-12 RX ORDER — OXYMETAZOLINE HYDROCHLORIDE 0.05 G/100ML
SPRAY NASAL AS NEEDED
Status: DISCONTINUED | OUTPATIENT
Start: 2021-03-12 | End: 2021-03-12 | Stop reason: HOSPADM

## 2021-03-12 RX ORDER — HYDROCODONE BITARTRATE AND ACETAMINOPHEN 7.5; 325 MG/1; MG/1
1 TABLET ORAL ONCE AS NEEDED
Status: DISCONTINUED | OUTPATIENT
Start: 2021-03-12 | End: 2021-03-12 | Stop reason: HOSPADM

## 2021-03-12 RX ORDER — LIDOCAINE HYDROCHLORIDE 10 MG/ML
0.5 INJECTION, SOLUTION EPIDURAL; INFILTRATION; INTRACAUDAL; PERINEURAL ONCE AS NEEDED
Status: DISCONTINUED | OUTPATIENT
Start: 2021-03-12 | End: 2021-03-12 | Stop reason: HOSPADM

## 2021-03-12 RX ORDER — FLUMAZENIL 0.1 MG/ML
0.2 INJECTION INTRAVENOUS AS NEEDED
Status: DISCONTINUED | OUTPATIENT
Start: 2021-03-12 | End: 2021-03-12 | Stop reason: HOSPADM

## 2021-03-12 RX ORDER — ERYTHROMYCIN 5 MG/G
OINTMENT OPHTHALMIC AS NEEDED
Status: DISCONTINUED | OUTPATIENT
Start: 2021-03-12 | End: 2021-03-12 | Stop reason: HOSPADM

## 2021-03-12 RX ORDER — DIPHENHYDRAMINE HYDROCHLORIDE 50 MG/ML
12.5 INJECTION INTRAMUSCULAR; INTRAVENOUS
Status: DISCONTINUED | OUTPATIENT
Start: 2021-03-12 | End: 2021-03-12 | Stop reason: HOSPADM

## 2021-03-12 RX ORDER — DEXAMETHASONE SODIUM PHOSPHATE 10 MG/ML
INJECTION INTRAMUSCULAR; INTRAVENOUS AS NEEDED
Status: DISCONTINUED | OUTPATIENT
Start: 2021-03-12 | End: 2021-03-12 | Stop reason: SURG

## 2021-03-12 RX ORDER — GLYCOPYRROLATE 0.2 MG/ML
INJECTION INTRAMUSCULAR; INTRAVENOUS AS NEEDED
Status: DISCONTINUED | OUTPATIENT
Start: 2021-03-12 | End: 2021-03-12 | Stop reason: SURG

## 2021-03-12 RX ORDER — PROMETHAZINE HYDROCHLORIDE 25 MG/1
25 TABLET ORAL ONCE AS NEEDED
Status: DISCONTINUED | OUTPATIENT
Start: 2021-03-12 | End: 2021-03-12 | Stop reason: HOSPADM

## 2021-03-12 RX ORDER — SODIUM CHLORIDE, SODIUM LACTATE, POTASSIUM CHLORIDE, CALCIUM CHLORIDE 600; 310; 30; 20 MG/100ML; MG/100ML; MG/100ML; MG/100ML
9 INJECTION, SOLUTION INTRAVENOUS CONTINUOUS
Status: DISCONTINUED | OUTPATIENT
Start: 2021-03-12 | End: 2021-03-12 | Stop reason: HOSPADM

## 2021-03-12 RX ORDER — ROCURONIUM BROMIDE 10 MG/ML
INJECTION, SOLUTION INTRAVENOUS AS NEEDED
Status: DISCONTINUED | OUTPATIENT
Start: 2021-03-12 | End: 2021-03-12 | Stop reason: SURG

## 2021-03-12 RX ORDER — NALOXONE HCL 0.4 MG/ML
0.2 VIAL (ML) INJECTION AS NEEDED
Status: DISCONTINUED | OUTPATIENT
Start: 2021-03-12 | End: 2021-03-12 | Stop reason: HOSPADM

## 2021-03-12 RX ORDER — LIDOCAINE HYDROCHLORIDE 20 MG/ML
INJECTION, SOLUTION INFILTRATION; PERINEURAL AS NEEDED
Status: DISCONTINUED | OUTPATIENT
Start: 2021-03-12 | End: 2021-03-12 | Stop reason: SURG

## 2021-03-12 RX ORDER — FENTANYL CITRATE 50 UG/ML
INJECTION, SOLUTION INTRAMUSCULAR; INTRAVENOUS AS NEEDED
Status: DISCONTINUED | OUTPATIENT
Start: 2021-03-12 | End: 2021-03-12 | Stop reason: SURG

## 2021-03-12 RX ORDER — HYDROCODONE BITARTRATE AND ACETAMINOPHEN 5; 325 MG/1; MG/1
1 TABLET ORAL EVERY 6 HOURS PRN
Qty: 15 TABLET | Refills: 0 | Status: SHIPPED | OUTPATIENT
Start: 2021-03-12 | End: 2021-07-27

## 2021-03-12 RX ORDER — PROMETHAZINE HYDROCHLORIDE 25 MG/1
25 SUPPOSITORY RECTAL ONCE AS NEEDED
Status: DISCONTINUED | OUTPATIENT
Start: 2021-03-12 | End: 2021-03-12 | Stop reason: HOSPADM

## 2021-03-12 RX ORDER — FENTANYL CITRATE 50 UG/ML
50 INJECTION, SOLUTION INTRAMUSCULAR; INTRAVENOUS
Status: DISCONTINUED | OUTPATIENT
Start: 2021-03-12 | End: 2021-03-12 | Stop reason: HOSPADM

## 2021-03-12 RX ORDER — FAMOTIDINE 10 MG/ML
20 INJECTION, SOLUTION INTRAVENOUS ONCE
Status: COMPLETED | OUTPATIENT
Start: 2021-03-12 | End: 2021-03-12

## 2021-03-12 RX ORDER — OXYCODONE AND ACETAMINOPHEN 7.5; 325 MG/1; MG/1
1 TABLET ORAL ONCE AS NEEDED
Status: COMPLETED | OUTPATIENT
Start: 2021-03-12 | End: 2021-03-12

## 2021-03-12 RX ORDER — ERYTHROMYCIN 5 MG/G
OINTMENT OPHTHALMIC 2 TIMES DAILY
Qty: 3.5 G | Refills: 1 | Status: SHIPPED | OUTPATIENT
Start: 2021-03-12 | End: 2021-07-27

## 2021-03-12 RX ORDER — HYDROMORPHONE HYDROCHLORIDE 1 MG/ML
0.5 INJECTION, SOLUTION INTRAMUSCULAR; INTRAVENOUS; SUBCUTANEOUS
Status: DISCONTINUED | OUTPATIENT
Start: 2021-03-12 | End: 2021-03-12 | Stop reason: HOSPADM

## 2021-03-12 RX ORDER — PROPOFOL 10 MG/ML
VIAL (ML) INTRAVENOUS AS NEEDED
Status: DISCONTINUED | OUTPATIENT
Start: 2021-03-12 | End: 2021-03-12 | Stop reason: SURG

## 2021-03-12 RX ORDER — SODIUM CHLORIDE 0.9 % (FLUSH) 0.9 %
3 SYRINGE (ML) INJECTION EVERY 12 HOURS SCHEDULED
Status: DISCONTINUED | OUTPATIENT
Start: 2021-03-12 | End: 2021-03-12 | Stop reason: HOSPADM

## 2021-03-12 RX ORDER — EPHEDRINE SULFATE 50 MG/ML
5 INJECTION, SOLUTION INTRAVENOUS ONCE AS NEEDED
Status: DISCONTINUED | OUTPATIENT
Start: 2021-03-12 | End: 2021-03-12 | Stop reason: HOSPADM

## 2021-03-12 RX ORDER — ONDANSETRON 2 MG/ML
4 INJECTION INTRAMUSCULAR; INTRAVENOUS ONCE AS NEEDED
Status: COMPLETED | OUTPATIENT
Start: 2021-03-12 | End: 2021-03-12

## 2021-03-12 RX ORDER — DIPHENHYDRAMINE HCL 25 MG
25 CAPSULE ORAL
Status: DISCONTINUED | OUTPATIENT
Start: 2021-03-12 | End: 2021-03-12 | Stop reason: HOSPADM

## 2021-03-12 RX ADMIN — ONDANSETRON 4 MG: 2 INJECTION INTRAMUSCULAR; INTRAVENOUS at 12:29

## 2021-03-12 RX ADMIN — ROCURONIUM BROMIDE 30 MG: 50 INJECTION INTRAVENOUS at 10:42

## 2021-03-12 RX ADMIN — SODIUM CHLORIDE, POTASSIUM CHLORIDE, SODIUM LACTATE AND CALCIUM CHLORIDE 9 ML/HR: 600; 310; 30; 20 INJECTION, SOLUTION INTRAVENOUS at 08:28

## 2021-03-12 RX ADMIN — PHENYLEPHRINE HYDROCHLORIDE 100 MCG: 10 INJECTION INTRAVENOUS at 11:36

## 2021-03-12 RX ADMIN — DEXAMETHASONE SODIUM PHOSPHATE 8 MG: 10 INJECTION INTRAMUSCULAR; INTRAVENOUS at 10:55

## 2021-03-12 RX ADMIN — GLYCOPYRROLATE 0.4 MG: 0.2 INJECTION INTRAMUSCULAR; INTRAVENOUS at 12:29

## 2021-03-12 RX ADMIN — FENTANYL CITRATE 50 MCG: 50 INJECTION INTRAMUSCULAR; INTRAVENOUS at 12:34

## 2021-03-12 RX ADMIN — FENTANYL CITRATE 50 MCG: 50 INJECTION INTRAMUSCULAR; INTRAVENOUS at 10:38

## 2021-03-12 RX ADMIN — SODIUM CHLORIDE, POTASSIUM CHLORIDE, SODIUM LACTATE AND CALCIUM CHLORIDE: 600; 310; 30; 20 INJECTION, SOLUTION INTRAVENOUS at 12:13

## 2021-03-12 RX ADMIN — GLYCOPYRROLATE 0.2 MG: 0.2 INJECTION INTRAMUSCULAR; INTRAVENOUS at 10:59

## 2021-03-12 RX ADMIN — PROPOFOL 120 MG: 10 INJECTION, EMULSION INTRAVENOUS at 10:42

## 2021-03-12 RX ADMIN — HYDROMORPHONE HYDROCHLORIDE 0.5 MG: 1 INJECTION, SOLUTION INTRAMUSCULAR; INTRAVENOUS; SUBCUTANEOUS at 13:37

## 2021-03-12 RX ADMIN — LIDOCAINE HYDROCHLORIDE 60 MG: 20 INJECTION, SOLUTION INFILTRATION; PERINEURAL at 10:42

## 2021-03-12 RX ADMIN — PHENYLEPHRINE HYDROCHLORIDE 100 MCG: 10 INJECTION INTRAVENOUS at 10:56

## 2021-03-12 RX ADMIN — OXYCODONE HYDROCHLORIDE AND ACETAMINOPHEN 1 TABLET: 7.5; 325 TABLET ORAL at 13:37

## 2021-03-12 RX ADMIN — PROPOFOL 160 MCG/KG/MIN: 10 INJECTION, EMULSION INTRAVENOUS at 10:46

## 2021-03-12 RX ADMIN — FAMOTIDINE 20 MG: 10 INJECTION INTRAVENOUS at 08:30

## 2021-03-12 RX ADMIN — PHENYLEPHRINE HYDROCHLORIDE 100 MCG: 10 INJECTION INTRAVENOUS at 11:22

## 2021-03-12 RX ADMIN — Medication 2 MG: at 12:29

## 2021-03-12 NOTE — H&P
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 female presents with as above. No new meds/health problems since office visit      Allergies:   Allergies   Allergen Reactions   • Sulfa Antibiotics Hives   • Rocklatan [Netarsudil-Latanoprost] Itching and Swelling       10 point review of systems negative, except pertaining to the HPI    Medications:   Home Medications:  No current facility-administered medications on file prior to encounter.     Current Outpatient Medications on File Prior to Encounter   Medication Sig   • Calcium-Magnesium-Vitamin D ER (CALCIUM 1200+D3) 600- MG-MG-UNIT tablet sustained-release 24 hour Take 1 tablet by mouth Every Morning.   • fluticasone (FLONASE) 50 MCG/ACT nasal spray USE 2 SPRAYS IN EACH NOSTRIL EVERY DAY AS DIRECTED BY PROVIDER (Patient taking differently: 2 sprays into the nostril(s) as directed by provider Daily.)   • levothyroxine (SYNTHROID, LEVOTHROID) 75 MCG tablet Take 1 tablet by mouth Daily.   • lisinopril (PRINIVIL,ZESTRIL) 10 MG tablet Take 1 tablet by mouth Daily. (Patient taking differently: Take 10 mg by mouth Every Night.)   • montelukast (SINGULAIR) 10 MG tablet Take 1 tablet by mouth Daily. (Patient taking differently: Take 10 mg by mouth Every Night.)   • simvastatin (ZOCOR) 20 MG tablet Take 1 tablet by mouth every night at bedtime.     Current Medications:  Scheduled Meds:  Continuous Infusions:No current facility-administered medications for this encounter.    PRN Meds:.    Past Medical History:   Diagnosis Date   • Brittle bones    • Glaucoma    • Graves disease    • Graves disease    • History of transfusion    • Hyperlipidemia    • Hypertension    • Osteoporosis    • Overactive bladder    • PONV  (postoperative nausea and vomiting)     SEVERE   • Slow to wake up after anesthesia         Past Surgical History:   Procedure Laterality Date   • COLONOSCOPY     • KAMARA TUBE INSERTION Bilateral 7/21/2020    Procedure: WITH KAMARA TUBES;  Surgeon: Negrito Dennis MD;  Location: [facility], [place], operated by [facility];  Service: Ophthalmology;  Laterality: Bilateral;   • DACRYOCYSTORHINOSTOMY Bilateral 7/21/2020    Procedure: RIGHT DACRYOCYSTORHINOSTOMY BILATERAL  PROBE IRRIGATION WITH KAMARA TUBES;  Surgeon: Negrito Dennis MD;  Location: [facility], [place], operated by [facility];  Service: Ophthalmology;  Laterality: Bilateral;   • ECTROPION REPAIR Right 1/17/2020    Procedure: RIGHT EXTERNAL ETHMOIDECTOMY, RIGHT LOWER LID RETRACTION REPAIR WITH EYE BANK SCLERA;  Surgeon: Negrito Dennis MD;  Location: [facility], [place], operated by [facility];  Service: Ophthalmology   • EYE MUSCLE SURGERY Bilateral 11/2020   • FEMUR FRACTURE SURGERY Left    • HIP FRACTURE SURGERY Bilateral     pin & plates in left hip; pin & plate removed right   • ORBITAL DECOMPRESSION Right 1/17/2020    Procedure: RIGHT ORBITAL DECOMPRESSION;  Surgeon: Negrito Dennis MD;  Location: [facility], [place], operated by [facility];  Service: Ophthalmology   • ORBITAL DECOMPRESSION Left 3/12/2020    Procedure: LEFT ORBITAL DECOMPRESSION, LEFT EXTERNAL ETHMOIDECTOMY, LEFT LOWER LID RETRACTION REPAIR WITH EYE BANK SCLERA;  Surgeon: Negrito Dennis MD;  Location: [facility], [place], operated by [facility];  Service: Ophthalmology;  Laterality: Left;   • TOTAL THYROIDECTOMY          Social History     Occupational History   • Not on file   Tobacco Use   • Smoking status: Never Smoker   • Smokeless tobacco: Never Used   Vaping Use   • Vaping Use: Never used   Substance and Sexual Activity   • Alcohol use: Yes     Comment: HOLID[employer]INK[employer]   • Drug use: Never   • Sexual activity: Defer      Social History     Social History Narrative   • Not on file        Family History   Problem Relation Age of Onset   • Hypertension Mother    • Heart disease  Father    • Alcohol abuse Brother    • Malig Hyperthermia Neg Hx            Physical Exam   Constitutional: Alert, cooperative, in no acute distress    Head: Normocephalic.   Eyes:    Lateral lower eyelid retraction, right epiphora  Neck: Normal range of motion.   Cardiovascular: Normal rate.    Pulmonary/Chest: Effort normal.   Neurological: Alert.   Skin: Skin is warm.   Psychiatric: Normal mood and affect.       Assessment/Plan:  The patient voiced understanding of the risks, benefits, and alternative forms of treatment that were discussed and the patient consents to proceed with BILATERAL LOWER LID RETRACION REPAIR EYE BANK SCLERA POSSIBLE, right eye DCR with Patel tube      Negrito Dennis MD

## 2021-03-12 NOTE — ANESTHESIA POSTPROCEDURE EVALUATION
Patient: Jasmine Banegas    Procedure Summary     Date: 03/12/21 Room / Location:  [facility]  /  [facility]    Anesthesia Start: 1036 Anesthesia Stop: 1245    Procedure: MID FACE LIFT RIGHT ENCOSCOPIC DACRYOCYSTORHINOSTOMY REVISION WITH KAMARA TUBE PLACEMENT (Right Eye) Diagnosis:     Surgeons: Negrito Dennis MD Provider: Keyana, Fred Shepard MD    Anesthesia Type: Not recorded ASA Status: 3          Anesthesia Type: No value filed.    Vitals  Vitals Value Taken Time   /75 03/12/21 1315   Temp 36.6 °C (97.9 °F) 03/12/21 1243   Pulse 64 03/12/21 1317   Resp 16 03/12/21 1300   SpO2 100 % 03/12/21 1317   Vitals shown include unvalidated device data.        Post Anesthesia Care and Evaluation    Patient location during evaluation: bedside  Patient participation: complete - patient participated  Level of consciousness: awake and alert  Pain management: adequate  Airway patency: patent  Anesthetic complications: No anesthetic complications    Cardiovascular status: acceptable  Respiratory status: acceptable  Hydration status: acceptable    Comments: /77 (BP Location: Left arm, Patient Position: Lying)   Pulse 81   Temp 36.6 °C (97.9 °F) (Temporal)   Resp 16   SpO2 100%

## 2021-03-12 NOTE — OP NOTE
OPERATIVE NOTE    Patient Identification:  Name: Jasmine Banegas  Age: 75 y.o.  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  Right eye probing and irrigation with Patel tube placement , 2.  Bilateral eyelid ectropion repair, 3.  Bilateral midface lift  Surgeon: Negrito Dennis MD who was present and scrubbed throughout all critical portions of the operation  Assistants: Zeenat Up MD  Anesthesia: General  EBL: less than 50cc  Specimens: * No orders in the log *    Description of the procedure: The patient was taken to the operating room and placed on the table in the supine position, where anesthesia was induced. 2% lidocaine with epinephrine and 0.5% marcaine in a 1:1 fashion was injected over the surgical site, and the patient was prepped and draped in the usual manner for orbitofacial surgery.     Corneal protectors were placed in both eyes. Afrin soaked cottonoids were placed in the ipsilateral nasal vault.    A Maunaloa punctal dilator was used to dilate the upper and lower puncti on the right side.  The lacrimal outflow system was probed and found to be approximately obstructed at the site of previous rhinostomy and this was viewed directly with the nasal endoscope. The obstruction was passed with gentle pressure until the probe entered the nose through the previous rhinostomy. Patel tubing was inserted through the upper and lower canaliculi, through the rhinostomy and out the nose, where they were tied in 3 half knots and secured with a 4-0 Prolene suture at the lateral nasal sidewall.    Next attention was turned to the bilateral lower eyelid ectropion repair and midface lift.    A 15 Bard-Jesus blade incision was made at the right lateral canthus and carried laterally a  distance of 5 mm. A second incision was made 2 mm below the eyelash margin, across the lateral third extent of the lower eyelid. The inferior ramus of the lateral canthal tendon was identified and severed with sharp dissection.Next, the skin edge of the subciliary incision was retracted with a 4-prong skin rake and a sharp dissection plane was carried out below the orbicularis muscle layer until the entire lower lid was undermined. The orbital septum was opened horizontally, and herniated fatty tissue was removed. Bleeding was controlled with electrocauterization.   The inferior orbital rim was identified and a ribbon retractor was placed.  The Jose periosteal elevator was used to dissect along the surface of the periosteum in a pre-periosteal plane to release the cheek completely from the maxilla and face of the zygoma.       A 4-0 Prolene suture was used to engage the soft tissue of the cheek in the area of the suborbicularis oculi fat in a mattress fashion and then passed through the external aspect of the lateral orbital rim periosteum and tied to provide the desired amount of cheek lift to support the lower eyelid.      Next, A full-thickness en bloc excision of the lateral aspect of the tarsal plate was carried out with sharp dissection, and bleeding was controlled with electrocauterization. The cut edge of the tarsal plate was advanced to the lateral orbital rim periosteum, where it was sutured with 5-0 vicryl suture to the internal aspect of the lateral orbital rim periosteum.    The skin muscle flap was then advanced superiorly and temporally. Excessive skin muscle tissue was excised above the lateral canthal incision line. A 5 -0 Vicryl suture was then placed through the skin muscle flap of the cheek and also anchored to the lateral orbital rim periosteum with care to cover the deep Prolene suture. The skin muscle flap was anchored superiorly with a 5-0 vicryl suture anchored to the lateral orbital rim  periosteum. The skin was then closed with 5-0 fast absorbing suture.      The exact same procedure was performed on the contralateral lower lid and cheek.     The corneal protectors were removed and antibiotic ophthalmic ointment was placed over the surgical site.     The patient was then awakened and taken from the operating room in good condition, having tolerated the procedure well. There were no complications, and the estimated blood loss was less than 50 cc.

## 2021-03-12 NOTE — ANESTHESIA PROCEDURE NOTES
Airway  Urgency: elective    Date/Time: 3/12/2021 10:45 AM  Airway not difficult    General Information and Staff    Patient location during procedure: OR  Anesthesiologist: Fred Ridley MD  CRNA: Rm Vick CRNA    Indications and Patient Condition  Indications for airway management: airway protection    Preoxygenated: yes  MILS maintained throughout  Mask difficulty assessment: 1 - vent by mask    Final Airway Details  Final airway type: endotracheal airway      Successful airway: ETT  Cuffed: yes   Successful intubation technique: direct laryngoscopy  Facilitating devices/methods: intubating stylet  Endotracheal tube insertion site: oral  Blade: Alonzo  Blade size: 2  ETT size (mm): 7.0  Cormack-Lehane Classification: grade I - full view of glottis  Placement verified by: chest auscultation and capnometry   Cuff volume (mL): 6  Measured from: lips  ETT/EBT  to lips (cm): 20  Number of attempts at approach: 1  Assessment: lips, teeth, and gum same as pre-op and atraumatic intubation

## 2021-03-12 NOTE — ANESTHESIA PREPROCEDURE EVALUATION
Anesthesia Evaluation     Patient summary reviewed and Nursing notes reviewed   history of anesthetic complications (severe PONV in the past. None with Propofol infusion. Patient also c/o sore throat severe last time): PONV prolonged sedation  NPO Solid Status: > 8 hours  NPO Liquid Status: > 2 hours           Airway   Mallampati: I  TM distance: >3 FB  Neck ROM: full  Possible difficult intubation, Small opening and Narrow palate  Dental    (+) partials        Pulmonary - normal exam   (-) not a smoker  Cardiovascular - normal exam  Exercise tolerance: excellent (>7 METS)    ECG reviewed    (+) hypertension, hyperlipidemia,   (-) past MI, angina, PND, COATS      Neuro/Psych  GI/Hepatic/Renal/Endo    (+)   thyroid problem hypothyroidism    Musculoskeletal     Abdominal  - normal exam   Substance History      OB/GYN          Other   arthritis,          Phys Exam Other: Previous grade I view, easy mask with OAI                  Anesthesia Plan    ASA 3   (I have reviewed the patient's history with the patient and the chart, including all pertinent laboratory results and imaging. I have explained the risks of anesthesia including but not limited to dental damage, corneal abrasion, nerve injury, MI, stroke, and death.    subhypnotic propofol infusion for PONV, severe  )  intravenous induction     Anesthetic plan, all risks, benefits, and alternatives have been provided, discussed and informed consent has been obtained with: patient.    Plan discussed with CRNA.

## 2021-03-30 ENCOUNTER — APPOINTMENT (OUTPATIENT)
Dept: BONE DENSITY | Facility: HOSPITAL | Age: 76
End: 2021-03-30

## 2021-03-30 ENCOUNTER — APPOINTMENT (OUTPATIENT)
Dept: MAMMOGRAPHY | Facility: HOSPITAL | Age: 76
End: 2021-03-30

## 2021-05-26 ENCOUNTER — HOSPITAL ENCOUNTER (OUTPATIENT)
Dept: BONE DENSITY | Facility: HOSPITAL | Age: 76
Discharge: HOME | End: 2021-05-26

## 2021-05-26 ENCOUNTER — HOSPITAL ENCOUNTER (OUTPATIENT)
Dept: MAMMOGRAPHY | Facility: HOSPITAL | Age: 76
Discharge: HOME | End: 2021-05-26

## 2021-05-26 PROCEDURE — 77067 SCR MAMMO BI INCL CAD: CPT

## 2021-05-26 PROCEDURE — 77080 DXA BONE DENSITY AXIAL: CPT

## 2021-05-26 PROCEDURE — 77063 BREAST TOMOSYNTHESIS BI: CPT

## 2021-07-16 RX ORDER — LEVOTHYROXINE SODIUM 0.07 MG/1
TABLET ORAL
Qty: 90 TABLET | Refills: 0 | Status: SHIPPED | OUTPATIENT
Start: 2021-07-16 | End: 2021-07-27 | Stop reason: SDUPTHER

## 2021-07-19 DIAGNOSIS — E03.9 ADULT HYPOTHYROIDISM: ICD-10-CM

## 2021-07-19 DIAGNOSIS — M81.0 OSTEOPOROSIS, UNSPECIFIED OSTEOPOROSIS TYPE, UNSPECIFIED PATHOLOGICAL FRACTURE PRESENCE: ICD-10-CM

## 2021-07-19 DIAGNOSIS — E78.5 HYPERLIPIDEMIA, UNSPECIFIED HYPERLIPIDEMIA TYPE: Primary | ICD-10-CM

## 2021-07-19 DIAGNOSIS — I10 ESSENTIAL HYPERTENSION: ICD-10-CM

## 2021-07-22 LAB
25(OH)D3+25(OH)D2 SERPL-MCNC: 84.8 NG/ML (ref 30–100)
ALBUMIN SERPL-MCNC: 4.4 G/DL (ref 3.5–5.2)
ALBUMIN/GLOB SERPL: 1.9 G/DL
ALP SERPL-CCNC: 67 U/L (ref 39–117)
ALT SERPL-CCNC: 14 U/L (ref 1–33)
APPEARANCE UR: CLEAR
AST SERPL-CCNC: 23 U/L (ref 1–32)
BACTERIA #/AREA URNS HPF: NORMAL /HPF
BASOPHILS # BLD AUTO: 0.05 10*3/MM3 (ref 0–0.2)
BASOPHILS NFR BLD AUTO: 0.8 % (ref 0–1.5)
BILIRUB SERPL-MCNC: 0.5 MG/DL (ref 0–1.2)
BILIRUB UR QL STRIP: NEGATIVE
BUN SERPL-MCNC: 10 MG/DL (ref 8–23)
BUN/CREAT SERPL: 14.3 (ref 7–25)
CALCIUM SERPL-MCNC: 9.7 MG/DL (ref 8.6–10.5)
CASTS URNS MICRO: NORMAL
CHLORIDE SERPL-SCNC: 102 MMOL/L (ref 98–107)
CHOLEST SERPL-MCNC: 141 MG/DL (ref 0–200)
CO2 SERPL-SCNC: 28.6 MMOL/L (ref 22–29)
COLOR UR: YELLOW
CREAT SERPL-MCNC: 0.7 MG/DL (ref 0.57–1)
EOSINOPHIL # BLD AUTO: 0.1 10*3/MM3 (ref 0–0.4)
EOSINOPHIL NFR BLD AUTO: 1.6 % (ref 0.3–6.2)
EPI CELLS #/AREA URNS HPF: NORMAL /HPF
ERYTHROCYTE [DISTWIDTH] IN BLOOD BY AUTOMATED COUNT: 13.6 % (ref 12.3–15.4)
GLOBULIN SER CALC-MCNC: 2.3 GM/DL
GLUCOSE SERPL-MCNC: 88 MG/DL (ref 65–99)
GLUCOSE UR QL: NEGATIVE
HCT VFR BLD AUTO: 39.2 % (ref 34–46.6)
HDLC SERPL-MCNC: 60 MG/DL (ref 40–60)
HGB BLD-MCNC: 12.9 G/DL (ref 12–15.9)
HGB UR QL STRIP: NEGATIVE
IMM GRANULOCYTES # BLD AUTO: 0.02 10*3/MM3 (ref 0–0.05)
IMM GRANULOCYTES NFR BLD AUTO: 0.3 % (ref 0–0.5)
KETONES UR QL STRIP: NEGATIVE
LDLC SERPL CALC-MCNC: 72 MG/DL (ref 0–100)
LEUKOCYTE ESTERASE UR QL STRIP: NEGATIVE
LYMPHOCYTES # BLD AUTO: 1.91 10*3/MM3 (ref 0.7–3.1)
LYMPHOCYTES NFR BLD AUTO: 31.4 % (ref 19.6–45.3)
MCH RBC QN AUTO: 29.3 PG (ref 26.6–33)
MCHC RBC AUTO-ENTMCNC: 32.9 G/DL (ref 31.5–35.7)
MCV RBC AUTO: 88.9 FL (ref 79–97)
MONOCYTES # BLD AUTO: 0.51 10*3/MM3 (ref 0.1–0.9)
MONOCYTES NFR BLD AUTO: 8.4 % (ref 5–12)
NEUTROPHILS # BLD AUTO: 3.49 10*3/MM3 (ref 1.7–7)
NEUTROPHILS NFR BLD AUTO: 57.5 % (ref 42.7–76)
NITRITE UR QL STRIP: NEGATIVE
NRBC BLD AUTO-RTO: 0 /100 WBC (ref 0–0.2)
PH UR STRIP: 7.5 [PH] (ref 5–8)
PLATELET # BLD AUTO: 176 10*3/MM3 (ref 140–450)
POTASSIUM SERPL-SCNC: 4.8 MMOL/L (ref 3.5–5.2)
PROT SERPL-MCNC: 6.7 G/DL (ref 6–8.5)
PROT UR QL STRIP: NEGATIVE
RBC # BLD AUTO: 4.41 10*6/MM3 (ref 3.77–5.28)
RBC #/AREA URNS HPF: NORMAL /HPF
SODIUM SERPL-SCNC: 137 MMOL/L (ref 136–145)
SP GR UR: 1.01 (ref 1–1.03)
TRIGL SERPL-MCNC: 38 MG/DL (ref 0–150)
TSH SERPL DL<=0.005 MIU/L-ACNC: 0.49 UIU/ML (ref 0.27–4.2)
UROBILINOGEN UR STRIP-MCNC: NORMAL MG/DL
VLDLC SERPL CALC-MCNC: 9 MG/DL (ref 5–40)
WBC # BLD AUTO: 6.08 10*3/MM3 (ref 3.4–10.8)
WBC #/AREA URNS HPF: NORMAL /HPF

## 2021-07-27 ENCOUNTER — OFFICE VISIT (OUTPATIENT)
Dept: INTERNAL MEDICINE | Facility: CLINIC | Age: 76
End: 2021-07-27

## 2021-07-27 VITALS
HEART RATE: 70 BPM | WEIGHT: 108 LBS | OXYGEN SATURATION: 95 % | SYSTOLIC BLOOD PRESSURE: 140 MMHG | HEIGHT: 60 IN | DIASTOLIC BLOOD PRESSURE: 80 MMHG | BODY MASS INDEX: 21.2 KG/M2

## 2021-07-27 DIAGNOSIS — Z12.11 COLON CANCER SCREENING: ICD-10-CM

## 2021-07-27 DIAGNOSIS — I10 ESSENTIAL HYPERTENSION: ICD-10-CM

## 2021-07-27 DIAGNOSIS — E78.5 HYPERLIPIDEMIA, UNSPECIFIED HYPERLIPIDEMIA TYPE: ICD-10-CM

## 2021-07-27 DIAGNOSIS — Z00.00 MEDICARE ANNUAL WELLNESS VISIT, SUBSEQUENT: Primary | ICD-10-CM

## 2021-07-27 DIAGNOSIS — M81.0 OSTEOPOROSIS, UNSPECIFIED OSTEOPOROSIS TYPE, UNSPECIFIED PATHOLOGICAL FRACTURE PRESENCE: ICD-10-CM

## 2021-07-27 DIAGNOSIS — Z00.00 WELL ADULT EXAM: ICD-10-CM

## 2021-07-27 PROBLEM — H40.9 GLAUCOMA: Status: RESOLVED | Noted: 2021-01-19 | Resolved: 2021-07-27

## 2021-07-27 PROCEDURE — G0439 PPPS, SUBSEQ VISIT: HCPCS | Performed by: INTERNAL MEDICINE

## 2021-07-27 PROCEDURE — 99397 PER PM REEVAL EST PAT 65+ YR: CPT | Performed by: INTERNAL MEDICINE

## 2021-07-27 PROCEDURE — 1159F MED LIST DOCD IN RCRD: CPT | Performed by: INTERNAL MEDICINE

## 2021-07-27 PROCEDURE — 1170F FXNL STATUS ASSESSED: CPT | Performed by: INTERNAL MEDICINE

## 2021-07-27 PROCEDURE — 1126F AMNT PAIN NOTED NONE PRSNT: CPT | Performed by: INTERNAL MEDICINE

## 2021-07-27 PROCEDURE — 96160 PT-FOCUSED HLTH RISK ASSMT: CPT | Performed by: INTERNAL MEDICINE

## 2021-07-27 RX ORDER — MONTELUKAST SODIUM 10 MG/1
10 TABLET ORAL DAILY
Qty: 90 TABLET | Refills: 3 | Status: SHIPPED | OUTPATIENT
Start: 2021-07-27 | End: 2022-08-08

## 2021-07-27 RX ORDER — LEVOTHYROXINE SODIUM 0.07 MG/1
75 TABLET ORAL DAILY
Qty: 90 TABLET | Refills: 3 | Status: SHIPPED | OUTPATIENT
Start: 2021-07-27 | End: 2022-08-08

## 2021-07-27 RX ORDER — LISINOPRIL 10 MG/1
10 TABLET ORAL DAILY
Qty: 90 TABLET | Refills: 3 | Status: SHIPPED | OUTPATIENT
Start: 2021-07-27 | End: 2022-08-08

## 2021-07-27 RX ORDER — SIMVASTATIN 20 MG
20 TABLET ORAL
Qty: 90 TABLET | Refills: 3 | Status: SHIPPED | OUTPATIENT
Start: 2021-07-27 | End: 2022-08-08

## 2021-07-27 NOTE — PATIENT INSTRUCTIONS
Medicare Wellness  Personal Prevention Plan of Service     Date of Office Visit:  2021  Encounter Provider:  Chanel Duncan MD  Place of Service:  [facility]  Patient Name: Jasmine Banegas  :  1945    As part of the Medicare Wellness portion of your visit today, we are providing you with this personalized preventive plan of services (PPPS). This plan is based upon recommendations of the [place] Preventive Services Task Force (USPSTF) and the Advisory Committee on Immunization Practices (ACIP).    This lists the preventive care services that should be considered, and provides dates of when you are due. Items listed as completed are up-to-date and do not require any further intervention.    Health Maintenance   Topic Date Due   • ZOSTER VACCINE (1 of 2) Never done   • COLORECTAL CANCER SCREENING  2018   • ANNUAL WELLNESS VISIT  2021   • INFLUENZA VACCINE  10/01/2021   • LIPID PANEL  2022   • TDAP/TD VACCINES (2 - Td or Tdap) 10/01/2022   • MAMMOGRAM  2023   • DXA SCAN  2023   • HEPATITIS C SCREENING  Completed   • COVID-19 Vaccine  Completed   • Pneumococcal Vaccine 65+  Completed       No orders of the defined types were placed in this encounter.      No follow-ups on file.

## 2021-07-27 NOTE — PROGRESS NOTES
The ABCs of the Annual Wellness Visit  Subsequent Medicare Wellness Visit    Chief Complaint   Patient presents with   • Medicare Wellness-subsequent       Subjective   History of Present Illness:  Jasmine Banegas is a 75 y.o. female who presents for a Subsequent Medicare Wellness Visit.    The following data was reviewed by: Chanel Duncan MD on 07/27/2021:  Common labs    Common Labsle 1/13/21 1/13/21 1/13/21 3/10/21 3/10/21 7/21/21 7/21/21 7/21/21    0940 0940 0940 1132 1132 0922 0922 0922   Glucose     81      Glucose  87     88    BUN  12   13  10    Creatinine  0.68   0.54 (A)  0.70    eGFR Non African Am  85   110  82    eGFR  Am  103     99    Sodium  137   138  137    Potassium  4.5   4.1  4.8    Chloride  100   102  102    Calcium  9.4   9.1  9.7    Total Protein  6.7     6.7    Albumin  4.40     4.40    Total Bilirubin  0.4     0.5    Alkaline Phosphatase  63     67    AST (SGOT)  21     23    ALT (SGPT)  12     14    WBC 5.78   4.91  6.08     Hemoglobin 12.5   12.6  12.9     Hematocrit 36.8   38.5  39.2     Platelets 223   178  176     Total Cholesterol   154     141   Triglycerides   42     38   HDL Cholesterol   62 (A)     60   LDL Cholesterol    83     72   (A) Abnormal value       Comments are available for some flowsheets but are not being displayed.           HTN.  Control is borderline.  We discussed checking at home.  HLD.  LDL control is good.  Hypothyroidism.  Control is good.  OP.  She stopped Prolia during the pandemic.  We discussed restarting.            HEALTH RISK ASSESSMENT    Recent Hospitalizations:  No hospitalization(s) within the last year.    Current Medical Providers:  Patient Care Team:  Chanel Duncan MD as PCP - General  Chanel Duncan MD as PCP - Family Medicine    Smoking Status:  Social History     Tobacco Use   Smoking Status Never Smoker   Smokeless Tobacco Never Used       Alcohol Consumption:  Social History     Substance and Sexual Activity   Alcohol  Use Yes    Comment: [employer]OL[employer]INKER       Depression Screen:   PHQ-2/PHQ-9 Depression Screening 7/27/2021   Little interest or pleasure in doing things 0   Feeling down, depressed, or hopeless 0   Trouble falling or staying asleep, or sleeping too much -   Feeling tired or having little energy -   Poor appetite or overeating -   Feeling bad about yourself - or that you are a failure or have let yourself or your family down -   Trouble concentrating on things, such as reading the newspaper or watching television -   Moving or speaking so slowly that other people could have noticed. Or the opposite - being so fidgety or restless that you have been moving around a lot more than usual -   Thoughts that you would be better off dead, or of hurting yourself in some way -   Total Score 0   If you checked off any problems, how difficult have these problems made it for you to do your work, take care of things at home, or get along with other people? -       Fall Risk Screen:  JAY Fall Risk Assessment was completed, and patient is at LOW risk for falls.Assessment completed on:7/27/2021    Health Habits and Functional and Cognitive Screening:  Functional & Cognitive Status 7/27/2021   Do you have difficulty preparing food and eating? No   Do you have difficulty bathing yourself, getting dressed or grooming yourself? No   Do you have difficulty using the toilet? No   Do you have difficulty moving around from place to place? No   Do you have trouble with steps or getting out of a bed or a chair? No   Current Diet Well Balanced Diet   Dental Exam Up to date   Eye Exam Up to date   Exercise (times per week) 2 times per week   Current Exercises Include Walking;House Cleaning   Current Exercise Activities Include -   Do you need help using the phone?  No   Are you deaf or do you have serious difficulty hearing?  No   Do you need help with transportation? No   Do you need help shopping? No   Do you need help preparing meals?  No    Do you need help with housework?  No   Do you need help with laundry? No   Do you need help taking your medications? No   Do you need help managing money? No   Do you ever drive or ride in a car without wearing a seat belt? No   Have you felt unusual stress, anger or loneliness in the last month? No   Who do you live with? Alone   If you need help, do you have trouble finding someone available to you? No   Have you been bothered in the last four weeks by sexual problems? No   Do you have difficulty concentrating, remembering or making decisions? No         Does the patient have evidence of cognitive impairment? No    Asprin use counseling:Does not need ASA (and currently is not on it)    Age-appropriate Screening Schedule:  Refer to the list below for future screening recommendations based on patient's age, sex and/or medical conditions. Orders for these recommended tests are listed in the plan section. The patient has been provided with a written plan.    Health Maintenance   Topic Date Due   • ZOSTER VACCINE (1 of 2) Never done   • INFLUENZA VACCINE  10/01/2021   • LIPID PANEL  07/21/2022   • TDAP/TD VACCINES (2 - Td or Tdap) 10/01/2022   • MAMMOGRAM  05/26/2023   • DXA SCAN  05/26/2023          The following portions of the patient's history were reviewed and updated as appropriate: allergies, current medications, past family history, past medical history, past social history, past surgical history and problem list.    Outpatient Medications Prior to Visit   Medication Sig Dispense Refill   • Calcium-Magnesium-Vitamin D ER (CALCIUM 1200+D3) 600- MG-MG-UNIT tablet sustained-release 24 hour Take 1 tablet by mouth Every Morning.     • fluticasone (FLONASE) 50 MCG/ACT nasal spray USE 2 SPRAYS IN EACH NOSTRIL EVERY DAY AS DIRECTED BY PROVIDER (Patient taking differently: 2 sprays into the nostril(s) as directed by provider Daily.) 16 g 11   • latanoprost (XALATAN) 0.005 % ophthalmic solution Administer 1 drop  into the left eye Every Night.     • timolol (TIMOPTIC) 0.25 % ophthalmic solution Administer 1 drop to both eyes 2 (Two) Times a Day.     • erythromycin (ROMYCIN) 5 MG/GM ophthalmic ointment Apply to surgical site 2x/day. May use in eye for discomfort. 3.5 g 1   • HYDROcodone-acetaminophen (NORCO) 5-325 MG per tablet Take 1 tablet by mouth Every 6 (Six) Hours As Needed for Moderate Pain  (pain). 15 tablet 0   • levothyroxine (SYNTHROID, LEVOTHROID) 75 MCG tablet Take 1 tablet by mouth once daily 90 tablet 0   • lisinopril (PRINIVIL,ZESTRIL) 10 MG tablet Take 1 tablet by mouth Daily. (Patient taking differently: Take 10 mg by mouth Every Night.) 90 tablet 3   • montelukast (SINGULAIR) 10 MG tablet Take 1 tablet by mouth Daily. (Patient taking differently: Take 10 mg by mouth Every Night.) 90 tablet 3   • simvastatin (ZOCOR) 20 MG tablet Take 1 tablet by mouth every night at bedtime. 90 tablet 3     No facility-administered medications prior to visit.       Patient Active Problem List   Diagnosis   • HLD (hyperlipidemia)   • Adult hypothyroidism   • OP (osteoporosis)   • Essential hypertension   • Overactive bladder   • Glaucoma of both eyes   • Osteoarthritis of knee   • Graves' eye disease       Advanced Care Planning:  ACP discussion was held with the patient during this visit. Patient does not have an advance directive, information provided.    Review of Systems   HENT: Positive for congestion, rhinorrhea and sinus pressure.    Respiratory: Negative.    Cardiovascular: Negative.        Compared to one year ago, the patient feels her physical health is the same.  Compared to one year ago, the patient feels her mental health is the same.    Reviewed chart for potential of high risk medication in the elderly: yes  Reviewed chart for potential of harmful drug interactions in the elderly:yes    Objective         Vitals:    07/27/21 1122   BP: 140/80   Pulse: 70   SpO2: 95%   Weight: 49 kg (108 lb)   Height: 152.4 cm  "(60\")   PainSc: 0-No pain       Body mass index is 21.09 kg/m².  Discussed the patient's BMI with her. The BMI is in the acceptable range.    Physical Exam  Constitutional:       Appearance: She is well-developed.   HENT:      Head: Normocephalic and atraumatic.      Right Ear: Hearing, tympanic membrane and external ear normal.      Left Ear: Hearing, tympanic membrane and external ear normal.      Nose: Nose normal.   Neck:      Thyroid: No thyromegaly.   Cardiovascular:      Rate and Rhythm: Normal rate and regular rhythm.      Heart sounds: Normal heart sounds. No murmur heard.     Pulmonary:      Effort: Pulmonary effort is normal.      Breath sounds: Normal breath sounds.   Chest:      Breasts:         Right: No mass.         Left: No mass.   Abdominal:      General: There is no distension.      Palpations: Abdomen is soft.      Tenderness: There is no abdominal tenderness.   Musculoskeletal:      Cervical back: Neck supple.   Lymphadenopathy:      Cervical: No cervical adenopathy.   Skin:     General: Skin is warm and dry.   Neurological:      Mental Status: She is alert and oriented to person, place, and time.   Psychiatric:         Speech: Speech normal.         Behavior: Behavior normal.         Thought Content: Thought content normal.         Judgment: Judgment normal.         Lab Results   Component Value Date    GLU 88 07/21/2021    CHLPL 141 07/21/2021    TRIG 38 07/21/2021    HDL 60 07/21/2021    LDL 72 07/21/2021    VLDL 9 07/21/2021        Assessment/Plan   Medicare Risks and Personalized Health Plan  CMS Preventative Services Quick Reference  Colon Cancer Screening  Immunizations Discussed/Encouraged (specific immunizations; Shingrix )    The above risks/problems have been discussed with the patient.  Pertinent information has been shared with the patient in the After Visit Summary.  Follow up plans and orders are seen below in the Assessment/Plan Section.    Diagnoses and all orders for this " visit:    1. Medicare annual wellness visit, subsequent (Primary)    2. Well adult exam    3. Essential hypertension    4. Hyperlipidemia, unspecified hyperlipidemia type    5. Osteoporosis, unspecified osteoporosis type, unspecified pathological fracture presence  -     Ambulatory Referral to [facility] For Infusion Treatment    6. Colon cancer screening  -     Ambulatory Referral For Screening Colonoscopy    Other orders  -     denosumab (PROLIA) syringe 60 mg  -     lisinopril (PRINIVIL,ZESTRIL) 10 MG tablet; Take 1 tablet by mouth Daily.  Dispense: 90 tablet; Refill: 3  -     simvastatin (ZOCOR) 20 MG tablet; Take 1 tablet by mouth every night at bedtime.  Dispense: 90 tablet; Refill: 3  -     montelukast (SINGULAIR) 10 MG tablet; Take 1 tablet by mouth Daily.  Dispense: 90 tablet; Refill: 3  -     levothyroxine (SYNTHROID, LEVOTHROID) 75 MCG tablet; Take 1 tablet by mouth Daily.  Dispense: 90 tablet; Refill: 3      HTN.  Control is borderline.  The patient is advised to continue current dosage of lisinopril.  The patient is instructed to monitor at home and call in checks.    HLD.  Control is good.  The patient is advised to continue current dosage of simvastatin.  OP.  I recommend to get 1200 mg of calcium and 1000 IUs of vitamin D through diet and supplements and to participate in a weight based exercise to prevent loss of bone mineral density. Bone mineral will be monitored every two years.  Restart Prolia.    She is going to call her allergist and f/u on her allergies.        Follow Up:  Return in about 6 months (around 1/27/2022).     An After Visit Summary and PPPS were given to the patient.

## 2021-08-10 ENCOUNTER — TELEPHONE (OUTPATIENT)
Dept: ONCOLOGY | Facility: HOSPITAL | Age: 76
End: 2021-08-10

## 2021-08-10 NOTE — TELEPHONE ENCOUNTER
Spoke with Lauren at Dr. Duncan's office with verbal ok to proceed with prolia based on calcium from 7/21.  Additional labs in therapy plan cancelled.

## 2021-08-11 ENCOUNTER — INFUSION (OUTPATIENT)
Dept: ONCOLOGY | Facility: HOSPITAL | Age: 76
End: 2021-08-11

## 2021-08-11 VITALS — RESPIRATION RATE: 18 BRPM | BODY MASS INDEX: 20.41 KG/M2 | HEIGHT: 61 IN | TEMPERATURE: 97.1 F

## 2021-08-11 DIAGNOSIS — M81.0 OSTEOPOROSIS, UNSPECIFIED OSTEOPOROSIS TYPE, UNSPECIFIED PATHOLOGICAL FRACTURE PRESENCE: Primary | ICD-10-CM

## 2021-08-11 PROCEDURE — 25010000002 DENOSUMAB 60 MG/ML SOLUTION PREFILLED SYRINGE: Performed by: INTERNAL MEDICINE

## 2021-08-11 PROCEDURE — 96372 THER/PROPH/DIAG INJ SC/IM: CPT

## 2021-08-11 RX ADMIN — DENOSUMAB 60 MG: 60 INJECTION SUBCUTANEOUS at 14:18

## 2022-02-02 DIAGNOSIS — E03.9 ADULT HYPOTHYROIDISM: ICD-10-CM

## 2022-02-02 DIAGNOSIS — I10 ESSENTIAL HYPERTENSION: ICD-10-CM

## 2022-02-02 DIAGNOSIS — E78.5 HYPERLIPIDEMIA, UNSPECIFIED HYPERLIPIDEMIA TYPE: Primary | ICD-10-CM

## 2022-02-03 LAB
ALBUMIN SERPL-MCNC: 4.6 G/DL (ref 3.7–4.7)
ALBUMIN/GLOB SERPL: 2.2 {RATIO} (ref 1.2–2.2)
ALP SERPL-CCNC: 56 IU/L (ref 44–121)
ALT SERPL-CCNC: 12 IU/L (ref 0–32)
AST SERPL-CCNC: 23 IU/L (ref 0–40)
BASOPHILS # BLD AUTO: 0.1 X10E3/UL (ref 0–0.2)
BASOPHILS NFR BLD AUTO: 1 %
BILIRUB SERPL-MCNC: 0.4 MG/DL (ref 0–1.2)
BUN SERPL-MCNC: 18 MG/DL (ref 8–27)
BUN/CREAT SERPL: 25 (ref 12–28)
CALCIUM SERPL-MCNC: 9.4 MG/DL (ref 8.7–10.3)
CHLORIDE SERPL-SCNC: 102 MMOL/L (ref 96–106)
CHOLEST SERPL-MCNC: 152 MG/DL (ref 100–199)
CO2 SERPL-SCNC: 25 MMOL/L (ref 20–29)
CREAT SERPL-MCNC: 0.73 MG/DL (ref 0.57–1)
EOSINOPHIL # BLD AUTO: 0.1 X10E3/UL (ref 0–0.4)
EOSINOPHIL NFR BLD AUTO: 1 %
ERYTHROCYTE [DISTWIDTH] IN BLOOD BY AUTOMATED COUNT: 13.6 % (ref 11.7–15.4)
GLOBULIN SER CALC-MCNC: 2.1 G/DL (ref 1.5–4.5)
GLUCOSE SERPL-MCNC: 85 MG/DL (ref 65–99)
HCT VFR BLD AUTO: 40.8 % (ref 34–46.6)
HDLC SERPL-MCNC: 58 MG/DL
HGB BLD-MCNC: 13.3 G/DL (ref 11.1–15.9)
IMM GRANULOCYTES # BLD AUTO: 0 X10E3/UL (ref 0–0.1)
IMM GRANULOCYTES NFR BLD AUTO: 0 %
LDLC SERPL CALC-MCNC: 85 MG/DL (ref 0–99)
LYMPHOCYTES # BLD AUTO: 1.5 X10E3/UL (ref 0.7–3.1)
LYMPHOCYTES NFR BLD AUTO: 17 %
MCH RBC QN AUTO: 29.4 PG (ref 26.6–33)
MCHC RBC AUTO-ENTMCNC: 32.6 G/DL (ref 31.5–35.7)
MCV RBC AUTO: 90 FL (ref 79–97)
MONOCYTES # BLD AUTO: 0.5 X10E3/UL (ref 0.1–0.9)
MONOCYTES NFR BLD AUTO: 6 %
NEUTROPHILS # BLD AUTO: 6.7 X10E3/UL (ref 1.4–7)
NEUTROPHILS NFR BLD AUTO: 75 %
PLATELET # BLD AUTO: 214 X10E3/UL (ref 150–450)
POTASSIUM SERPL-SCNC: 5.6 MMOL/L (ref 3.5–5.2)
PROT SERPL-MCNC: 6.7 G/DL (ref 6–8.5)
RBC # BLD AUTO: 4.53 X10E6/UL (ref 3.77–5.28)
SODIUM SERPL-SCNC: 139 MMOL/L (ref 134–144)
TRIGL SERPL-MCNC: 39 MG/DL (ref 0–149)
TSH SERPL DL<=0.005 MIU/L-ACNC: 1.16 UIU/ML (ref 0.45–4.5)
VLDLC SERPL CALC-MCNC: 9 MG/DL (ref 5–40)
WBC # BLD AUTO: 8.9 X10E3/UL (ref 3.4–10.8)

## 2022-02-09 ENCOUNTER — OFFICE VISIT (OUTPATIENT)
Dept: INTERNAL MEDICINE | Facility: CLINIC | Age: 77
End: 2022-02-09

## 2022-02-09 VITALS
BODY MASS INDEX: 20.77 KG/M2 | HEIGHT: 61 IN | HEART RATE: 75 BPM | WEIGHT: 110 LBS | OXYGEN SATURATION: 98 % | SYSTOLIC BLOOD PRESSURE: 122 MMHG | DIASTOLIC BLOOD PRESSURE: 82 MMHG

## 2022-02-09 DIAGNOSIS — N30.00 ACUTE CYSTITIS WITHOUT HEMATURIA: ICD-10-CM

## 2022-02-09 DIAGNOSIS — I10 ESSENTIAL HYPERTENSION: Primary | ICD-10-CM

## 2022-02-09 DIAGNOSIS — E87.5 HYPERKALEMIA: ICD-10-CM

## 2022-02-09 DIAGNOSIS — E03.9 ADULT HYPOTHYROIDISM: ICD-10-CM

## 2022-02-09 DIAGNOSIS — E78.5 HYPERLIPIDEMIA, UNSPECIFIED HYPERLIPIDEMIA TYPE: ICD-10-CM

## 2022-02-09 PROCEDURE — 99214 OFFICE O/P EST MOD 30 MIN: CPT | Performed by: INTERNAL MEDICINE

## 2022-02-09 RX ORDER — NITROFURANTOIN 25; 75 MG/1; MG/1
100 CAPSULE ORAL 2 TIMES DAILY
Qty: 14 CAPSULE | Refills: 0 | OUTPATIENT
Start: 2022-02-09 | End: 2022-02-11

## 2022-02-09 NOTE — PROGRESS NOTES
Subjective     Jasmine Banegas is a 76 y.o. female who presents with   Chief Complaint   Patient presents with   • Hypertension   • Hyperlipidemia   • Hypothyroidism       History of Present Illness     The following data was reviewed by: Chanel Duncan MD on 02/09/2022:  Common labs    Common Labsle 3/10/21 3/10/21 7/21/21 7/21/21 7/21/21 2/2/22 2/2/22 2/2/22    1132 1132 0922 0922 0922 0901 0901 0901   Glucose  81  88   85    BUN  13  10   18    Creatinine  0.54 (A)  0.70   0.73    eGFR Non African Am  110  82   81    eGFR  Am    99   93    Sodium  138  137   139    Potassium  4.1  4.8   5.6 (A)    Chloride  102  102   102    Calcium  9.1  9.7   9.4    Total Protein    6.7   6.7    Albumin    4.40   4.6    Total Bilirubin    0.5   0.4    Alkaline Phosphatase    67   56    AST (SGOT)    23   23    ALT (SGPT)    14   12    WBC 4.91  6.08   8.9     Hemoglobin 12.6  12.9   13.3     Hematocrit 38.5  39.2   40.8     Platelets 178  176   214     Total Cholesterol     141   152   Triglycerides     38   39   HDL Cholesterol     60   58   LDL Cholesterol      72   85   (A) Abnormal value       Comments are available for some flowsheets but are not being displayed.           HTN.  Control is good.  HLD.  Excellent control.  Hypothyroidism.  TSH is good.      She feels like she has UTI with frequency and burning for one week.      Review of Systems   Respiratory: Negative.    Cardiovascular: Negative.        The following portions of the patient's history were reviewed and updated as appropriate: allergies, current medications and problem list.    Patient Active Problem List    Diagnosis Date Noted   • Graves' eye disease 07/14/2020   • Osteoarthritis of knee 03/19/2018   • Glaucoma of both eyes 03/14/2017   • Overactive bladder 03/07/2016   • HLD (hyperlipidemia) 02/25/2016     Note Last Updated: 2/25/2016     Description: Statins started in 2/2014 secondary to 7.6% 10 year ASCVD risk.     • Adult hypothyroidism  "02/25/2016   • OP (osteoporosis) 02/25/2016     Note Last Updated: 2/25/2016     Description: Fosamax [id number]  Prolia started in 2014. Recommend 1200mg calcium and 1000IUs D in diet/supplements.     • Essential hypertension 02/25/2016     Note Last Updated: 2/25/2016     Impression: 02/12/2014 - Blood pressure is well-controlled when not in the office.  I do encourage to check regularly.;          Current Outpatient Medications on File Prior to Visit   Medication Sig Dispense Refill   • Calcium-Magnesium-Vitamin D ER (CALCIUM 1200+D3) 600- MG-MG-UNIT tablet sustained-release 24 hour Take 1 tablet by mouth Every Morning.     • fluticasone (FLONASE) 50 MCG/ACT nasal spray USE 2 SPRAYS IN EACH NOSTRIL EVERY DAY AS DIRECTED BY PROVIDER (Patient taking differently: 2 sprays into the nostril(s) as directed by provider Daily.) 16 g 11   • latanoprost (XALATAN) 0.005 % ophthalmic solution Administer 1 drop into the left eye Every Night.     • levothyroxine (SYNTHROID, LEVOTHROID) 75 MCG tablet Take 1 tablet by mouth Daily. 90 tablet 3   • lisinopril (PRINIVIL,ZESTRIL) 10 MG tablet Take 1 tablet by mouth Daily. 90 tablet 3   • montelukast (SINGULAIR) 10 MG tablet Take 1 tablet by mouth Daily. 90 tablet 3   • simvastatin (ZOCOR) 20 MG tablet Take 1 tablet by mouth every night at bedtime. 90 tablet 3   • timolol (TIMOPTIC) 0.25 % ophthalmic solution Administer 1 drop to both eyes 2 (Two) Times a Day.       No current facility-administered medications on file prior to visit.       Objective     /82   Pulse 75   Ht 154.9 cm (60.98\")   Wt 49.9 kg (110 lb)   SpO2 98%   BMI 20.80 kg/m²     Physical Exam  Constitutional:       Appearance: She is well-developed.   HENT:      Head: Normocephalic and atraumatic.   Cardiovascular:      Rate and Rhythm: Normal rate and regular rhythm.      Heart sounds: Normal heart sounds.   Pulmonary:      Effort: Pulmonary effort is normal.      Breath sounds: Normal breath sounds. "   Abdominal:      General: There is no distension.      Palpations: Abdomen is soft. There is no mass.      Tenderness: There is no abdominal tenderness. There is no guarding or rebound.   Skin:     General: Skin is warm and dry.   Neurological:      Mental Status: She is alert and oriented to person, place, and time.   Psychiatric:         Behavior: Behavior normal.         Assessment/Plan   Diagnoses and all orders for this visit:    1. Essential hypertension (Primary)    2. Hyperlipidemia, unspecified hyperlipidemia type    3. Adult hypothyroidism    4. Acute cystitis without hematuria  -     UA / M With / Rflx Culture([facility] ONLY) - Urine, Clean Catch  -     Basic Metabolic Panel    5. Hyperkalemia  -     UA / M With / Rflx Culture([facility] ONLY) - Urine, Clean Catch  -     Basic Metabolic Panel    Other orders  -     nitrofurantoin, macrocrystal-monohydrate, (Macrobid) 100 MG capsule; Take 1 capsule by mouth 2 (Two) Times a Day.  Dispense: 14 capsule; Refill: 0        Discussion    HTN.  Control is good.  The patient is advised to continue current dosage of lisinopril.  HLD.  Control is good.  The patient is advised to continue current dosage of simvastatin.  Hypothyroidism.  Control is good.  The patient is advised to continue current dosage of levothyroxine.    Hyperkalemia.  Recheck labs today.   Patient presents with symptoms of acute cystitis.  We will send for culture and follow up on that.  Prescription for antibiotics is sent in.  The patient is instructed to let our office know if not feeling better over the next several days or if there is any change in symptoms.                Future Appointments   Date Time Provider Department Center   2/16/2022  2:00 PM REFERRED INJECTION CHAIR [facility]

## 2022-02-11 ENCOUNTER — HOSPITAL ENCOUNTER (EMERGENCY)
Facility: HOSPITAL | Age: 77
Discharge: HOME | End: 2022-02-11
Attending: EMERGENCY MEDICINE | Admitting: EMERGENCY MEDICINE

## 2022-02-11 ENCOUNTER — TELEMEDICINE (OUTPATIENT)
Dept: INTERNAL MEDICINE | Facility: CLINIC | Age: 77
End: 2022-02-11

## 2022-02-11 ENCOUNTER — APPOINTMENT (OUTPATIENT)
Dept: CT IMAGING | Facility: HOSPITAL | Age: 77
End: 2022-02-11

## 2022-02-11 VITALS
TEMPERATURE: 98.2 F | RESPIRATION RATE: 16 BRPM | SYSTOLIC BLOOD PRESSURE: 154 MMHG | OXYGEN SATURATION: 97 % | HEART RATE: 78 BPM | DIASTOLIC BLOOD PRESSURE: 79 MMHG

## 2022-02-11 DIAGNOSIS — R10.31 RIGHT LOWER QUADRANT ABDOMINAL PAIN: Primary | ICD-10-CM

## 2022-02-11 DIAGNOSIS — K57.32 DIVERTICULITIS OF LARGE INTESTINE WITHOUT PERFORATION OR ABSCESS WITHOUT BLEEDING: Primary | ICD-10-CM

## 2022-02-11 LAB
ALBUMIN SERPL-MCNC: 4.6 G/DL (ref 3.5–5.2)
ALBUMIN/GLOB SERPL: 1.6 G/DL
ALP SERPL-CCNC: 53 U/L (ref 39–117)
ALT SERPL W P-5'-P-CCNC: 14 U/L (ref 1–33)
ANION GAP SERPL CALCULATED.3IONS-SCNC: 12 MMOL/L (ref 5–15)
AST SERPL-CCNC: 22 U/L (ref 1–32)
BASOPHILS # BLD AUTO: 0.02 10*3/MM3 (ref 0–0.2)
BASOPHILS NFR BLD AUTO: 0.2 % (ref 0–1.5)
BILIRUB SERPL-MCNC: 1 MG/DL (ref 0–1.2)
BILIRUB UR QL STRIP: NEGATIVE
BUN SERPL-MCNC: 12 MG/DL (ref 8–23)
BUN/CREAT SERPL: 18.5 (ref 7–25)
CALCIUM SPEC-SCNC: 8.9 MG/DL (ref 8.6–10.5)
CHLORIDE SERPL-SCNC: 95 MMOL/L (ref 98–107)
CLARITY UR: CLEAR
CO2 SERPL-SCNC: 26 MMOL/L (ref 22–29)
COLOR UR: YELLOW
CREAT SERPL-MCNC: 0.65 MG/DL (ref 0.57–1)
DEPRECATED RDW RBC AUTO: 43.1 FL (ref 37–54)
EOSINOPHIL # BLD AUTO: 0.01 10*3/MM3 (ref 0–0.4)
EOSINOPHIL NFR BLD AUTO: 0.1 % (ref 0.3–6.2)
ERYTHROCYTE [DISTWIDTH] IN BLOOD BY AUTOMATED COUNT: 13.4 % (ref 12.3–15.4)
GFR SERPL CREATININE-BSD FRML MDRD: 89 ML/MIN/1.73
GLOBULIN UR ELPH-MCNC: 2.9 GM/DL
GLUCOSE SERPL-MCNC: 112 MG/DL (ref 65–99)
GLUCOSE UR STRIP-MCNC: NEGATIVE MG/DL
HCT VFR BLD AUTO: 41.4 % (ref 34–46.6)
HGB BLD-MCNC: 13.9 G/DL (ref 12–15.9)
HGB UR QL STRIP.AUTO: NEGATIVE
IMM GRANULOCYTES # BLD AUTO: 0.05 10*3/MM3 (ref 0–0.05)
IMM GRANULOCYTES NFR BLD AUTO: 0.5 % (ref 0–0.5)
KETONES UR QL STRIP: NEGATIVE
LEUKOCYTE ESTERASE UR QL STRIP.AUTO: NEGATIVE
LIPASE SERPL-CCNC: 27 U/L (ref 13–60)
LYMPHOCYTES # BLD AUTO: 0.97 10*3/MM3 (ref 0.7–3.1)
LYMPHOCYTES NFR BLD AUTO: 10.4 % (ref 19.6–45.3)
MCH RBC QN AUTO: 30.1 PG (ref 26.6–33)
MCHC RBC AUTO-ENTMCNC: 33.6 G/DL (ref 31.5–35.7)
MCV RBC AUTO: 89.6 FL (ref 79–97)
MONOCYTES # BLD AUTO: 0.72 10*3/MM3 (ref 0.1–0.9)
MONOCYTES NFR BLD AUTO: 7.7 % (ref 5–12)
NEUTROPHILS NFR BLD AUTO: 7.59 10*3/MM3 (ref 1.7–7)
NEUTROPHILS NFR BLD AUTO: 81.1 % (ref 42.7–76)
NITRITE UR QL STRIP: NEGATIVE
NRBC BLD AUTO-RTO: 0 /100 WBC (ref 0–0.2)
PH UR STRIP.AUTO: 7 [PH] (ref 5–8)
PLATELET # BLD AUTO: 153 10*3/MM3 (ref 140–450)
PMV BLD AUTO: 11 FL (ref 6–12)
POTASSIUM SERPL-SCNC: 3.8 MMOL/L (ref 3.5–5.2)
PROT SERPL-MCNC: 7.5 G/DL (ref 6–8.5)
PROT UR QL STRIP: NEGATIVE
RBC # BLD AUTO: 4.62 10*6/MM3 (ref 3.77–5.28)
SODIUM SERPL-SCNC: 133 MMOL/L (ref 136–145)
SP GR UR STRIP: 1.01 (ref 1–1.03)
UROBILINOGEN UR QL STRIP: NORMAL
WBC NRBC COR # BLD: 9.36 10*3/MM3 (ref 3.4–10.8)

## 2022-02-11 PROCEDURE — 80053 COMPREHEN METABOLIC PANEL: CPT | Performed by: NURSE PRACTITIONER

## 2022-02-11 PROCEDURE — 85025 COMPLETE CBC W/AUTO DIFF WBC: CPT | Performed by: NURSE PRACTITIONER

## 2022-02-11 PROCEDURE — 81003 URINALYSIS AUTO W/O SCOPE: CPT | Performed by: NURSE PRACTITIONER

## 2022-02-11 PROCEDURE — 83690 ASSAY OF LIPASE: CPT | Performed by: NURSE PRACTITIONER

## 2022-02-11 PROCEDURE — 74176 CT ABD & PELVIS W/O CONTRAST: CPT

## 2022-02-11 PROCEDURE — 99283 EMERGENCY DEPT VISIT LOW MDM: CPT

## 2022-02-11 PROCEDURE — 99213 OFFICE O/P EST LOW 20 MIN: CPT | Performed by: INTERNAL MEDICINE

## 2022-02-11 RX ORDER — SODIUM CHLORIDE 0.9 % (FLUSH) 0.9 %
10 SYRINGE (ML) INJECTION AS NEEDED
Status: DISCONTINUED | OUTPATIENT
Start: 2022-02-11 | End: 2022-02-11 | Stop reason: HOSPADM

## 2022-02-11 RX ORDER — METRONIDAZOLE 500 MG/1
500 TABLET ORAL 2 TIMES DAILY
Qty: 14 TABLET | Refills: 0 | Status: SHIPPED | OUTPATIENT
Start: 2022-02-11 | End: 2022-02-18

## 2022-02-11 RX ORDER — CIPROFLOXACIN 500 MG/1
500 TABLET, FILM COATED ORAL 2 TIMES DAILY
Qty: 14 TABLET | Refills: 0 | Status: SHIPPED | OUTPATIENT
Start: 2022-02-11 | End: 2022-02-18

## 2022-02-11 NOTE — ED PROVIDER NOTES
EMERGENCY DEPARTMENT ENCOUNTER    Room Number:  20/20  Date seen:  2/11/2022  PCP: Chanel Duncan MD  Historian: Patient      HPI:  Chief Complaint: Abdominal pain  A complete HPI/ROS/PMH/PSH/SH/FH are unobtainable due to: Nothing  Context: Jasmine Banegas is a 76 y.o. female who presents to the ED c/o abdominal pain.  Patient reports the pain really started last night.  It is localized to the right lower abdomen and then radiated through to her back.  It was intermittent throughout the night.  She believes it may be related to the nitrofurantoin that she started taking earlier this week because she was having some urinary frequency.  She also reports occasional nausea and may be some chills but no definite fever.  The pain is better this morning.  She has not taken anything for the pain and she is declining pain medication at this time.  She denies any rash.  She denies any recent fall or injury.  No bowel or bladder incontinence.  No tingling, numbness, weakness in her lower extremities.  She denies diarrhea or vomiting.            PAST MEDICAL HISTORY  Active Ambulatory Problems     Diagnosis Date Noted   • HLD (hyperlipidemia) 02/25/2016   • Adult hypothyroidism 02/25/2016   • OP (osteoporosis) 02/25/2016   • Essential hypertension 02/25/2016   • Overactive bladder 03/07/2016   • Glaucoma of both eyes 03/14/2017   • Osteoarthritis of knee 03/19/2018   • Graves' eye disease 07/14/2020     Resolved Ambulatory Problems     Diagnosis Date Noted   • Senile osteoporosis 03/21/2017   • Post-op pain 01/17/2020   • Glaucoma 01/19/2021     Past Medical History:   Diagnosis Date   • Brittle bones    • Graves disease    • Graves disease    • History of transfusion    • Hyperlipidemia    • Hypertension    • Osteoporosis    • PONV (postoperative nausea and vomiting)    • Slow to wake up after anesthesia          PAST SURGICAL HISTORY  Past Surgical History:   Procedure Laterality Date   • COLONOSCOPY     • KAMARA TUBE  INSERTION Bilateral 7/21/2020    Procedure: WITH KAMARA TUBES;  Surgeon: Negrito Dennis MD;  [place] [facility] [place] [facility] [place]  Service: Ophthalmology;  Laterality: Bilateral;   • DACRYOCYSTORHINOSTOMY Bilateral 7/21/2020    Procedure: RIGHT DACRYOCYSTORHINOSTOMY BILATERAL  PROBE IRRIGATION WITH KAMARA TUBES;  Surgeon: Negrito Dennis MD;  [place] [facility] [place] [facility] [place]  Service: Ophthalmology;  Laterality: Bilateral;   • DACRYOCYSTORHINOSTOMY Right 3/12/2021    Procedure: MID FACE LIFT RIGHT ENCOSCOPIC DACRYOCYSTORHINOSTOMY REVISION WITH KAMARA TUBE PLACEMENT;  Surgeon: Negrito Dennis MD;  [place] [facility] [place] [facility] [place]  Service: Ophthalmology;  Laterality: Right;   • ECTROPION REPAIR Right 1/17/2020    Procedure: RIGHT EXTERNAL ETHMOIDECTOMY, RIGHT LOWER LID RETRACTION REPAIR WITH EYE BANK SCLERA;  Surgeon: Negrito Dennis MD;  [place] [facility] [place] [facility] [place]  Service: Ophthalmology   • EYE MUSCLE SURGERY Bilateral 11/2020   • FEMUR FRACTURE SURGERY Left    • HIP FRACTURE SURGERY Bilateral     pin & plates in left hip; pin & plate removed right   • ORBITAL DECOMPRESSION Right 1/17/2020    Procedure: RIGHT ORBITAL DECOMPRESSION;  Surgeon: Negrito Dennis MD;  Location: [facility];  Service: Ophthalmology   • ORBITAL DECOMPRESSION Left 3/12/2020    Procedure: LEFT ORBITAL DECOMPRESSION, LEFT EXTERNAL ETHMOIDECTOMY, LEFT LOWER LID RETRACTION REPAIR WITH EYE BANK SCLERA;  Surgeon: Negrito Dennis MD;  [place] [facility] [place] [facility] [place]  Service: Ophthalmology;  Laterality: Left;   • TOTAL THYROIDECTOMY           FAMILY HISTORY  Family History   Problem Relation Age of Onset   • Hypertension Mother    • Heart disease Father    • Alcohol abuse Brother    • Malig Hyperthermia Neg Hx    • Breast cancer Neg Hx          SOCIAL HISTORY  Social History     Socioeconomic History   • Marital status: Single   Tobacco Use   • Smoking status: Never Smoker   • Smokeless tobacco: Never Used   Vaping Use   •  Vaping Use: Never used   Substance and Sexual Activity   • Alcohol use: Yes     Comment: HOLIDAY DRINKER   • Drug use: Never   • Sexual activity: Defer         ALLERGIES  Sulfa antibiotics and Rocklatan [[id number]]        REVIEW OF SYSTEMS  Review of Systems   Review of all 14 systems is negative other than stated in the HPI above.      PHYSICAL EXAM  ED Triage Vitals [02/11/22 1046]   Temp Heart Rate Resp BP SpO2   98.2 °F (36.8 °C) 111 16 -- 94 %      Temp src Heart Rate Source Patient Position BP Location FiO2 (%)   -- Monitor -- -- --         GENERAL: Awake and alert, no acute distress  HENT: nares patent  EYES: no scleral icterus, EOMI  CV: regular rhythm, normal rate  RESPIRATORY: normal effort  ABDOMEN: soft, nondistended, nontender throughout, no right CVA tenderness  MUSCULOSKELETAL: no deformity, no midline L-spine tenderness or step-off  NEURO: alert, moves all extremities, follows commands  PSYCH:  calm, cooperative  SKIN: warm, dry, normal to inspection, no rash    Vital signs and nursing notes reviewed.          LAB RESULTS  Recent Results (from the past 24 hour(s))   Comprehensive Metabolic Panel    Collection Time: 02/11/22 11:55 AM    Specimen: Blood   Result Value Ref Range    Glucose 112 (H) 65 - 99 mg/dL    BUN 12 8 - 23 mg/dL    Creatinine 0.65 0.57 - 1.00 mg/dL    Sodium 133 (L) 136 - 145 mmol/L    Potassium 3.8 3.5 - 5.2 mmol/L    Chloride 95 (L) 98 - 107 mmol/L    CO2 26.0 22.0 - 29.0 mmol/L    Calcium 8.9 8.6 - 10.5 mg/dL    Total Protein 7.5 6.0 - 8.5 g/dL    Albumin 4.60 3.50 - 5.20 g/dL    ALT (SGPT) 14 1 - 33 U/L    AST (SGOT) 22 1 - 32 U/L    Alkaline Phosphatase 53 39 - 117 U/L    Total Bilirubin 1.0 0.0 - 1.2 mg/dL    eGFR Non African Amer 89 >60 mL/min/1.73    Globulin 2.9 gm/dL    A/G Ratio 1.6 g/dL    BUN/Creatinine Ratio 18.5 7.0 - 25.0    Anion Gap 12.0 5.0 - 15.0 mmol/L   Lipase    Collection Time: 02/11/22 11:55 AM    Specimen: Blood   Result Value Ref Range     Lipase 27 13 - 60 U/L   Urinalysis With Microscopic If Indicated (No Culture) - Urine, Clean Catch    Collection Time: 02/11/22 11:55 AM    Specimen: Urine, Clean Catch   Result Value Ref Range    Color, UA Yellow Yellow, Straw    Appearance, UA Clear Clear    pH, UA 7.0 5.0 - 8.0    Specific Gravity, UA 1.007 1.005 - 1.030    Glucose, UA Negative Negative    Ketones, UA Negative Negative    Bilirubin, UA Negative Negative    Blood, UA Negative Negative    Protein, UA Negative Negative    Leuk Esterase, UA Negative Negative    Nitrite, UA Negative Negative    Urobilinogen, UA 0.2 E.U./dL 0.2 - 1.0 E.U./dL   CBC Auto Differential    Collection Time: 02/11/22 11:55 AM    Specimen: Blood   Result Value Ref Range    WBC 9.36 3.40 - 10.80 10*3/mm3    RBC 4.62 3.77 - 5.28 10*6/mm3    Hemoglobin 13.9 12.0 - 15.9 g/dL    Hematocrit 41.4 34.0 - 46.6 %    MCV 89.6 79.0 - 97.0 fL    MCH 30.1 26.6 - 33.0 pg    MCHC 33.6 31.5 - 35.7 g/dL    RDW 13.4 12.3 - 15.4 %    RDW-SD 43.1 37.0 - 54.0 fl    MPV 11.0 6.0 - 12.0 fL    Platelets 153 140 - 450 10*3/mm3    Neutrophil % 81.1 (H) 42.7 - 76.0 %    Lymphocyte % 10.4 (L) 19.6 - 45.3 %    Monocyte % 7.7 5.0 - 12.0 %    Eosinophil % 0.1 (L) 0.3 - 6.2 %    Basophil % 0.2 0.0 - 1.5 %    Immature Grans % 0.5 0.0 - 0.5 %    Neutrophils, Absolute 7.59 (H) 1.70 - 7.00 10*3/mm3    Lymphocytes, Absolute 0.97 0.70 - 3.10 10*3/mm3    Monocytes, Absolute 0.72 0.10 - 0.90 10*3/mm3    Eosinophils, Absolute 0.01 0.00 - 0.40 10*3/mm3    Basophils, Absolute 0.02 0.00 - 0.20 10*3/mm3    Immature Grans, Absolute 0.05 0.00 - 0.05 10*3/mm3    nRBC 0.0 0.0 - 0.2 /100 WBC       Ordered the above labs and reviewed the results.        RADIOLOGY  CT Abdomen Pelvis Without Contrast    Result Date: 2/11/2022  CT ABDOMEN AND PELVIS WITHOUT IV CONTRAST  HISTORY: Right lower quadrant pain radiation to the back, dysuria  TECHNIQUE: Radiation dose reduction techniques were utilized, including automated exposure  control and exposure modulation based on body size. 3 mm images were obtained through the abdomen and pelvis without IV contrast.  COMPARISON: None  FINDINGS: Small hiatal hernia is present. There are no findings of small bowel obstruction.  No abdominopelvic adenopathy by size criteria is present. There is colonic diverticulosis. There appears to be a short segment of subtle wall thickening with surrounding fat stranding around the right distal aspect of the sigmoid colon, best seen on coronal imaging. Mild increased fat stranding is present within the lower aspect of the peritoneum. The appendix is unremarkable. No free intraperitoneal air is seen. The liver, gallbladder, pancreas, spleen and adrenal glands have an unremarkable noncontrast CT appearance.  Subcentimeter renal lesions are too small to characterize. Larger hypodense lesions demonstrating density less than 15 Hounsfield units are likely benign. Left nephrolithiasis is present, measuring up to approximately 4 to 5 mm. There is no hydronephrosis. While no ureteral calculus is seen, please note that there are portions of the right ureter which are not discretely seen due to lack of intraperitoneal fat.  Left femoral intramedullary jaymie and dynamic hip screw are incompletely visualized. There is generalized bone demineralization. Moderate degenerative changes are present at T11-12 with mild kyphosis at this level. There is mild retrolisthesis of T12 on L1.      1.  Findings suggestive of mild diverticulitis involving the right aspect of the distal sigmoid colon in the appropriate clinical context. Correlation with patient history is recommended. 2.  Increased stranding within the mesentery and mid to lower aspect of the peritoneum. Findings are nonspecific but can be seen in the setting of mild enterocolitis. 3.  Left nephrolithiasis without hydronephrosis or visualized ureteral calculus. Please note that there are portions of the right ureter which are  not discretely seen. 4.  Small hiatal hernia. 5.  Other findings as above.  This report was finalized on 2/11/2022 1:22 PM by Dr. Soham House M.D.        Ordered the above noted radiological studies. Reviewed by me in PACS.            PROCEDURES  Procedures              MEDICATIONS GIVEN IN ER  Medications - No data to display                MEDICAL DECISION MAKING, PROGRESS, and CONSULTS    All labs have been independently reviewed by me.  All radiology studies have been reviewed by me and discussed with radiologist dictating the report.   EKG's independently viewed and interpreted by me.  Discussion below represents my analysis of pertinent findings related to patient's condition, differential diagnosis, treatment plan and final disposition.      Differential diagnosis includes but is not limited to:  Appendicitis  Ureteral calculus  Herpes zoster  Cystitis  Pyelonephritis  Lumbar radiculopathy      ED Course as of 02/11/22 1940 Fri Feb 11, 2022   1340 Patient updated on CT findings and plan for discharge home with oral antibiotics.  I discussed return precautions and need for PCP follow-up. [JR]      ED Course User Index  [JR] Miguel Treviño MD     Patient presents with abdominal pain.  She had a relatively benign abdominal exam however CT scan does demonstrate some findings of diverticulitis.  She will be treated with oral antibiotics as outpatient.  She declined anything for pain.  I discussed return precautions specifically development of black or bloody stool, fever, worsening pain, intractable vomiting.         I wore an N95 mask, face shield, and gloves during this patient encounter.  Patient also wearing a surgical mask.  Hand hygeine performed before and after seeing the patient.    DIAGNOSIS  Final diagnoses:   Diverticulitis of large intestine without perforation or abscess without bleeding         DISPOSITION  DISCHARGE    Patient discharged in stable condition.    Reviewed implications of  results, diagnosis, meds, responsibility to follow up, warning signs and symptoms of possible worsening, potential complications and reasons to return to ER.    Patient/Family voiced understanding of above instructions.    Discussed plan for discharge, as there is no emergent indication for admission. Patient referred to primary care provider for BP management due to today's BP. Pt/family is agreeable and understands need for follow up and repeat testing.  Pt is aware that discharge does not mean that nothing is wrong but it indicates no emergency is present that requires admission and they must continue care with follow-up as given below or physician of their choice.     FOLLOW-UP  Chanel Duncan MD  [address] [facility] [address]  [place] [id number] [phone]    Schedule an appointment as soon as possible for a visit            Medication List      New Prescriptions    ciprofloxacin 500 MG tablet  Commonly known as: CIPRO  Take 1 tablet by mouth 2 (Two) Times a Day for 7 days.     metroNIDAZOLE 500 MG tablet  Commonly known as: FLAGYL  Take 1 tablet by mouth 2 (Two) Times a Day for 7 days.        Changed    fluticasone 50 MCG/ACT nasal spray  Commonly known as: FLONASE  USE 2 SPRAYS IN EACH NOSTRIL EVERY DAY AS DIRECTED BY PROVIDER  What changed: See the new instructions.        Stop    nitrofurantoin (macrocrystal-monohydrate) 100 MG capsule  Commonly known as: Macrobid           Where to Get Your Medications      These medications were sent to [facility] [address] [place], [place] - [address] - [phone]  - [phone]   [address] [facility] [address]    Phone: [phone]   · ciprofloxacin 500 MG tablet  · metroNIDAZOLE 500 MG tablet                   Latest Documented Vital Signs:  As of 19:40 EST  BP- 154/79 HR- 78 Temp- 98.2 °F (36.8 °C) O2 sat- 97%        --    Please note that portions of this were completed with a voice recognition program.          Miguel Treviño  MD Raymundo  02/11/22 1941

## 2022-02-11 NOTE — ED TRIAGE NOTES
Pt reports right lower abd pain that started yesterday.     Pt was wearing a mask during assessment.  This RN wore appropriate PPE

## 2022-02-11 NOTE — ED NOTES
This RN wore mask, gloves, and protective eyewear throughout patient encounter. Pt wearing mask at all times. Hand hygiene performed before and after entering room.        Mira Looney RN  02/11/22 8167

## 2022-02-11 NOTE — PROGRESS NOTES
Subjective     Jasmine Banegas is a 76 y.o. female who presents with   Chief Complaint   Patient presents with   • Abdominal Pain       History of Present Illness     You have chosen to receive care through a telehealth visit.  Do you consent to use a video/audio connection for your medical care today? Yes.    Patient calls with abrupt onset of severe RLQ pain starting last night. Radiates into her back.  No fever but she feels chills.          Review of Systems   Respiratory: Negative.    Cardiovascular: Negative.        The following portions of the patient's history were reviewed and updated as appropriate: allergies, current medications and problem list.    Patient Active Problem List    Diagnosis Date Noted   • Graves' eye disease 07/14/2020   • Osteoarthritis of knee 03/19/2018   • Glaucoma of both eyes 03/14/2017   • Overactive bladder 03/07/2016   • HLD (hyperlipidemia) 02/25/2016     Note Last Updated: 2/25/2016     Description: Statins started in 2/2014 secondary to 7.6% 10 year ASCVD risk.     • Adult hypothyroidism 02/25/2016   • OP (osteoporosis) 02/25/2016     Note Last Updated: 2/25/2016     Description: Fosamax [id number]  Prolia started in 2014. Recommend 1200mg calcium and 1000IUs D in diet/supplements.     • Essential hypertension 02/25/2016     Note Last Updated: 2/25/2016     Impression: 02/12/2014 - Blood pressure is well-controlled when not in the office.  I do encourage to check regularly.;          Current Outpatient Medications on File Prior to Visit   Medication Sig Dispense Refill   • Calcium-Magnesium-Vitamin D ER (CALCIUM 1200+D3) 600- MG-MG-UNIT tablet sustained-release 24 hour Take 1 tablet by mouth Every Morning.     • fluticasone (FLONASE) 50 MCG/ACT nasal spray USE 2 SPRAYS IN EACH NOSTRIL EVERY DAY AS DIRECTED BY PROVIDER (Patient taking differently: 2 sprays into the nostril(s) as directed by provider Daily.) 16 g 11   • latanoprost (XALATAN) 0.005 % ophthalmic solution  Administer 1 drop into the left eye Every Night.     • levothyroxine (SYNTHROID, LEVOTHROID) 75 MCG tablet Take 1 tablet by mouth Daily. 90 tablet 3   • lisinopril (PRINIVIL,ZESTRIL) 10 MG tablet Take 1 tablet by mouth Daily. 90 tablet 3   • montelukast (SINGULAIR) 10 MG tablet Take 1 tablet by mouth Daily. 90 tablet 3   • nitrofurantoin, macrocrystal-monohydrate, (Macrobid) 100 MG capsule Take 1 capsule by mouth 2 (Two) Times a Day. 14 capsule 0   • simvastatin (ZOCOR) 20 MG tablet Take 1 tablet by mouth every night at bedtime. 90 tablet 3   • timolol (TIMOPTIC) 0.25 % ophthalmic solution Administer 1 drop to both eyes 2 (Two) Times a Day.       No current facility-administered medications on file prior to visit.       Objective     There were no vitals taken for this visit.    Physical Exam  Constitutional:       Appearance: She is well-developed.   HENT:      Head: Normocephalic and atraumatic.   Pulmonary:      Effort: Pulmonary effort is normal.   Neurological:      Mental Status: She is alert and oriented to person, place, and time.   Psychiatric:         Behavior: Behavior normal.         Assessment/Plan   Diagnoses and all orders for this visit:    1. Right lower quadrant abdominal pain (Primary)        Discussion    Patient presents with severe RLQ pain starting last night.  I am concerned for acute appendicitis or kidney stone.  Our office is moving today to new location and is not open to in person visits.  She is instructed to go immediately to the emergency room for further evaluation.         Future Appointments   Date Time Provider Department Center   2/11/2022  9:00 AM Chanel Duncan MD [facility]   2/16/2022  2:00 PM REFERRED INJECTION CHAIR [facility]   8/17/2022  9:20 AM [facility]   8/24/2022 11:15 AM Chanel Duncan MD [facility]

## 2022-02-13 LAB
APPEARANCE UR: CLEAR
BACTERIA #/AREA URNS HPF: ABNORMAL /[HPF]
BACTERIA UR CULT: ABNORMAL
BACTERIA UR CULT: ABNORMAL
BILIRUB UR QL STRIP: NEGATIVE
BUN SERPL-MCNC: 16 MG/DL (ref 8–27)
BUN/CREAT SERPL: 21 (ref 12–28)
CALCIUM SERPL-MCNC: 9 MG/DL (ref 8.7–10.3)
CASTS URNS QL MICRO: ABNORMAL /LPF
CHLORIDE SERPL-SCNC: 102 MMOL/L (ref 96–106)
CO2 SERPL-SCNC: 25 MMOL/L (ref 20–29)
COLOR UR: YELLOW
CREAT SERPL-MCNC: 0.75 MG/DL (ref 0.57–1)
EPI CELLS #/AREA URNS HPF: ABNORMAL /HPF (ref 0–10)
GLUCOSE SERPL-MCNC: 79 MG/DL (ref 65–99)
GLUCOSE UR QL STRIP: NEGATIVE
HGB UR QL STRIP: NEGATIVE
KETONES UR QL STRIP: NEGATIVE
LEUKOCYTE ESTERASE UR QL STRIP: ABNORMAL
MICRO URNS: ABNORMAL
MUCOUS THREADS URNS QL MICRO: PRESENT
NITRITE UR QL STRIP: POSITIVE
OTHER ANTIBIOTIC SUSC ISLT: ABNORMAL
PH UR STRIP: 7.5 [PH] (ref 5–7.5)
POTASSIUM SERPL-SCNC: 4.7 MMOL/L (ref 3.5–5.2)
PROT UR QL STRIP: NEGATIVE
RBC #/AREA URNS HPF: ABNORMAL /HPF (ref 0–2)
SODIUM SERPL-SCNC: 139 MMOL/L (ref 134–144)
SP GR UR STRIP: 1.01 (ref 1–1.03)
URINALYSIS REFLEX: ABNORMAL
UROBILINOGEN UR STRIP-MCNC: 0.2 MG/DL (ref 0.2–1)
WBC #/AREA URNS HPF: >30 /HPF (ref 0–5)

## 2022-02-14 ENCOUNTER — TELEPHONE (OUTPATIENT)
Dept: INTERNAL MEDICINE | Facility: CLINIC | Age: 77
End: 2022-02-14

## 2022-02-14 DIAGNOSIS — K57.92 DIVERTICULITIS: Primary | ICD-10-CM

## 2022-02-14 NOTE — TELEPHONE ENCOUNTER
I reviewed with patient.  Diarrhea likely from antibiotics.  Referral is placed to surgery to address diverticulitis and likely need for colonoscopy.  TRENTW

## 2022-02-14 NOTE — TELEPHONE ENCOUNTER
PATIENT IS RETURNING DR MIN'S CALL FROM THIS MORNING. WANTED TO LET HER KNOW SHE IS NOW HAVING DIARRHEA. SHE MADE AN APPOINTMENT FOR TOMORROW.    PLEASE ADVISE  [phone]

## 2022-02-15 ENCOUNTER — TELEPHONE (OUTPATIENT)
Dept: ONCOLOGY | Facility: HOSPITAL | Age: 77
End: 2022-02-15

## 2022-02-15 NOTE — TELEPHONE ENCOUNTER
Spoke with ROVERTO Aguilar for Dr. Duncan with verbal ok to proceed with prolia from [facility] dated 2/11/22.  No additional labs needed and ok to proceed with prolia.

## 2022-02-16 ENCOUNTER — INFUSION (OUTPATIENT)
Dept: ONCOLOGY | Facility: HOSPITAL | Age: 77
End: 2022-02-16

## 2022-02-16 VITALS — BODY MASS INDEX: 19.64 KG/M2 | RESPIRATION RATE: 18 BRPM | HEIGHT: 63 IN | TEMPERATURE: 96.6 F

## 2022-02-16 DIAGNOSIS — M81.0 OSTEOPOROSIS, UNSPECIFIED OSTEOPOROSIS TYPE, UNSPECIFIED PATHOLOGICAL FRACTURE PRESENCE: Primary | ICD-10-CM

## 2022-02-16 PROCEDURE — 96372 THER/PROPH/DIAG INJ SC/IM: CPT

## 2022-02-16 PROCEDURE — 25010000002 DENOSUMAB 60 MG/ML SOLUTION PREFILLED SYRINGE: Performed by: INTERNAL MEDICINE

## 2022-02-16 RX ADMIN — DENOSUMAB 60 MG: 60 INJECTION SUBCUTANEOUS at 14:28

## 2022-03-08 ENCOUNTER — OFFICE VISIT (OUTPATIENT)
Dept: SURGERY | Facility: CLINIC | Age: 77
End: 2022-03-08

## 2022-03-08 VITALS — WEIGHT: 109 LBS | HEIGHT: 63 IN | BODY MASS INDEX: 19.31 KG/M2

## 2022-03-08 DIAGNOSIS — K57.92 DIVERTICULITIS: Primary | ICD-10-CM

## 2022-03-08 DIAGNOSIS — R10.31 RLQ ABDOMINAL PAIN: ICD-10-CM

## 2022-03-08 PROCEDURE — 99203 OFFICE O/P NEW LOW 30 MIN: CPT | Performed by: SURGERY

## 2022-03-08 RX ORDER — MULTIPLE VITAMINS W/ MINERALS TAB 9MG-400MCG
1 TAB ORAL DAILY
COMMUNITY

## 2022-03-08 NOTE — PROGRESS NOTES
General Surgery  Initial Office Visit    CC: Abdominal pain, questionable diverticulitis    HPI: The patient is a pleasant 76 y.o. year-old lady who presents today for evaluation of a recent episode of acute onset right lower quadrant abdominal pain that felt like a burning sensation beginning suddenly at night while she was laying in bed on the evening of 2/11/2022.  The pain was sudden in onset with rapid worsening in severity.  The pain did not radiate and she had no associated nausea, vomiting, or change in her bowel habits.  She went to the emergency room where a CT abdomen/pelvis was performed and was read as having possible lower sigmoid diverticulitis along the right side of the colonic wall.  She was discharged to home on oral antibiotics and instructed to follow-up with her primary care provider.  She saw Dr. Duncan a few days later and then was referred to see me to discuss her diverticulitis.  Her last colonoscopy was done in 2008 with Dr. Camryn Marcus and significant for sigmoid diverticulosis.  The antibiotic she took caused some diarrhea but she denies any blood in the stool.  She has made a full recovery and feels fine now that she has completed the antibiotics.    Past Medical History:   Hypertension  Hyperlipidemia  Glaucoma  Graves' disease  Postoperative hypothyroidism  Osteoarthritis  Osteoporosis  Postoperative nausea/vomiting (with fentanyl/Versed and general anesthesia)    Past Surgical History:   Colonoscopy (2008, Dr. Camryn marcus-sigmoid diverticulosis)  Multiple eye surgeries  Left femur fracture repair  Bilateral hip replacement  Total thyroidectomy    Medications:   Calcium/magnesium/vitamin D supplement once daily  Coenzyme Q 10 200 mg daily  Fluticasone nasal spray as needed  Latanoprost eyedrops daily  Levothyroxine 75 mcg daily  Lisinopril 10 mg daily  Montelukast 10 mg daily  Multivitamin with zinc 50 mg daily  Multivitamin once daily omega-3 fatty acids 1280 mg daily  Simvastatin 20  mg nightly  Timolol eyedrops daily    Allergies: Sulfa antibiotics (hives), Rocklatan (itching/swelling)    Family History: No family history of gastrointestinal malignancy, her mother had hypertension, father had coronary artery disease, and brother had alcohol abuse    Social History: Single, retired, non-smoker, social alcohol use on holidays only    ROS:   Constitutional: Negative for fevers or chills  HENT: Negative for hearing loss or runny nose  Eyes: Negative for vision changes or scleral icterus  Respiratory: Negative for cough or shortness of breath  Cardiovascular: Negative for chest pain or heart palpitations  Gastrointestinal: Positive for recent abdominal pain that has resolved; negative for abdominal distension, nausea, vomiting, constipation, melena, or hematochezia  Genitourinary: Negative for hematuria or dysuria  Musculoskeletal: Negative for joint swelling or gait instability  Neurologic: Negative for tremors or seizures  Psychiatric: Negative for suicidal ideations or agitation  All other systems reviewed and negative    Physical Exam:  Height: 160 cm  Weight: 49 kg  BMI: 19.31  General: No acute distress, well-nourished & well-developed  HEAD: normocephalic, atraumatic  EYES: normal conjunctiva, sclera anicteric  EARS: grossly normal hearing  NECK: supple, no thyromegaly  CARDIOVASCULAR: regular rate and rhythm  RESPIRATORY: clear to auscultation bilaterally  GASTROINTESTINAL: soft, nontender, non-distended  MUSCULOSKELETAL: normal gait and station. No gross extremity abnormalities  PSYCHIATRIC: oriented x3, normal mood and affect    IMAGING:  CT ABDOMEN/PELVIS (2/11/2022):  IMPRESSION:  1.  Findings suggestive of mild diverticulitis involving the right aspect of the distal sigmoid colon in the appropriate clinical context. Correlation with patient history is recommended.  2.  Increased stranding within the mesentery and mid to lower aspect of the peritoneum. Findings are nonspecific but can be  seen in the setting of mild enterocolitis.  3.  Left nephrolithiasis without hydronephrosis or visualized ureteral calculus. Please note that there are portions of the right ureter which are not discretely seen.  4.  Small hiatal hernia.    ASSESSMENT & PLAN  Ms. Banegas is a 76-year-old lady with a recent episode of right lower quadrant abdominal pain likely due to colitis versus diverticulitis.  I reviewed the CT abdomen/pelvis that was done during her ER visit on 2/11/2022.  I am unable to really appreciate the sigmoid diverticulitis that was mentioned, but it does appear as though the entire ascending colon has a thick-walled appearance consistent with colitis.  I have recommended we proceed with flexible colonoscopy.  I was able to review the colonoscopy report from 2008 where she had diverticulosis of the sigmoid colon but no polyps found.  I discussed with her the risks of colonoscopy that include bleeding, possible colon perforation, and possible missed pathology.  She had a lot of reservations about having to go under anesthesia again due to significant nausea/vomiting that occurred when she received fentanyl/Versed during her last colonoscopy.  I reassured her that we no longer use fentanyl/Versed and I used propofol which has a very low risk for postoperative nausea and vomiting.  She was relieved to hear this and will schedule colonoscopy before she leaves the office today.    Agnes Carlson MD  General, Robotic, and Endoscopic Surgery  [facility]    [address], [address]  [place] [address]  P: [phone]  F: [phone]

## 2022-03-10 ENCOUNTER — PATIENT ROUNDING (BHMG ONLY) (OUTPATIENT)
Dept: SURGERY | Facility: CLINIC | Age: 77
End: 2022-03-10

## 2022-03-10 NOTE — PROGRESS NOTES
March 10, 2022    Hello, may I speak with Jasmine S Banegas?    My name is Phill      I am  with [facility]AL [facility]  [address]  [facility] [address] [phone].    Before we get started may I verify your date of birth? 1945    I am calling to officially welcome you to our practice and ask about your recent visit. Is this a good time to talk? yes    Tell me about your visit with us. What things went well?  Everything went well.        We're always looking for ways to make our patients' experiences even better. Do you have recommendations on ways we may improve?  no    Overall were you satisfied with your first visit to our practice? yes       I appreciate you taking the time to speak with me today. Is there anything else I can do for you? no      Thank you, and have a great day.

## 2022-04-01 RX ORDER — FLUTICASONE PROPIONATE 50 MCG
SPRAY, SUSPENSION (ML) NASAL
Qty: 16 G | Refills: 0 | Status: SHIPPED | OUTPATIENT
Start: 2022-04-01 | End: 2022-08-08

## 2022-04-11 ENCOUNTER — HOSPITAL ENCOUNTER (OUTPATIENT)
Facility: HOSPITAL | Age: 77
Setting detail: HOSPITAL OUTPATIENT SURGERY
Discharge: HOME | End: 2022-04-11
Attending: SURGERY | Admitting: SURGERY

## 2022-04-11 ENCOUNTER — ANESTHESIA (OUTPATIENT)
Dept: GASTROENTEROLOGY | Facility: HOSPITAL | Age: 77
End: 2022-04-11

## 2022-04-11 ENCOUNTER — ANESTHESIA EVENT (OUTPATIENT)
Dept: GASTROENTEROLOGY | Facility: HOSPITAL | Age: 77
End: 2022-04-11

## 2022-04-11 VITALS
HEIGHT: 63 IN | SYSTOLIC BLOOD PRESSURE: 144 MMHG | DIASTOLIC BLOOD PRESSURE: 65 MMHG | BODY MASS INDEX: 18.98 KG/M2 | HEART RATE: 72 BPM | RESPIRATION RATE: 16 BRPM | OXYGEN SATURATION: 97 % | WEIGHT: 107.1 LBS

## 2022-04-11 PROCEDURE — 25010000002 PROPOFOL 10 MG/ML EMULSION: Performed by: NURSE ANESTHETIST, CERTIFIED REGISTERED

## 2022-04-11 PROCEDURE — 45378 DIAGNOSTIC COLONOSCOPY: CPT | Performed by: SURGERY

## 2022-04-11 PROCEDURE — 25010000002 ONDANSETRON PER 1 MG: Performed by: ANESTHESIOLOGY

## 2022-04-11 PROCEDURE — S0260 H&P FOR SURGERY: HCPCS | Performed by: SURGERY

## 2022-04-11 RX ORDER — SODIUM CHLORIDE, SODIUM LACTATE, POTASSIUM CHLORIDE, CALCIUM CHLORIDE 600; 310; 30; 20 MG/100ML; MG/100ML; MG/100ML; MG/100ML
1000 INJECTION, SOLUTION INTRAVENOUS CONTINUOUS
Status: DISCONTINUED | OUTPATIENT
Start: 2022-04-11 | End: 2022-04-11 | Stop reason: HOSPADM

## 2022-04-11 RX ORDER — PROPOFOL 10 MG/ML
VIAL (ML) INTRAVENOUS CONTINUOUS PRN
Status: DISCONTINUED | OUTPATIENT
Start: 2022-04-11 | End: 2022-04-11 | Stop reason: SURG

## 2022-04-11 RX ORDER — PROPOFOL 10 MG/ML
VIAL (ML) INTRAVENOUS AS NEEDED
Status: DISCONTINUED | OUTPATIENT
Start: 2022-04-11 | End: 2022-04-11 | Stop reason: SURG

## 2022-04-11 RX ORDER — LIDOCAINE HYDROCHLORIDE 20 MG/ML
INJECTION, SOLUTION INFILTRATION; PERINEURAL AS NEEDED
Status: DISCONTINUED | OUTPATIENT
Start: 2022-04-11 | End: 2022-04-11 | Stop reason: SURG

## 2022-04-11 RX ORDER — ONDANSETRON 2 MG/ML
4 INJECTION INTRAMUSCULAR; INTRAVENOUS ONCE
Status: COMPLETED | OUTPATIENT
Start: 2022-04-11 | End: 2022-04-11

## 2022-04-11 RX ADMIN — LIDOCAINE HYDROCHLORIDE 60 MG: 20 INJECTION, SOLUTION INFILTRATION; PERINEURAL at 12:34

## 2022-04-11 RX ADMIN — ONDANSETRON 4 MG: 2 INJECTION INTRAMUSCULAR; INTRAVENOUS at 13:20

## 2022-04-11 RX ADMIN — Medication 40 MG: at 12:37

## 2022-04-11 RX ADMIN — PROPOFOL 140 MCG/KG/MIN: 10 INJECTION, EMULSION INTRAVENOUS at 12:38

## 2022-04-11 RX ADMIN — Medication 30 MG: at 12:39

## 2022-04-11 RX ADMIN — SODIUM CHLORIDE, POTASSIUM CHLORIDE, SODIUM LACTATE AND CALCIUM CHLORIDE 1000 ML: 600; 310; 30; 20 INJECTION, SOLUTION INTRAVENOUS at 12:08

## 2022-04-11 NOTE — ANESTHESIA POSTPROCEDURE EVALUATION
"Patient: Jasmine Banegas    Procedure Summary     Date: 04/11/22 Room / Location:  [facility] /  [facility]    Anesthesia Start: 1233 Anesthesia Stop: 1303    Procedure: COLONOSCOPY to cecum (N/A ) Diagnosis:       RLQ abdominal pain      Diverticulitis      (RLQ abdominal pain [R10.31])      (Diverticulitis [K57.92])    Surgeons: Agnes Carlson MD Provider: Raymundo Browning MD    Anesthesia Type: MAC ASA Status: 2          Anesthesia Type: MAC    Vitals  Vitals Value Taken Time   /64 04/11/22 1301   Temp     Pulse 67 04/11/22 1301   Resp 16 04/11/22 1301   SpO2 98 % 04/11/22 1301           Post Anesthesia Care and Evaluation    Patient location during evaluation: bedside  Patient participation: complete - patient participated  Level of consciousness: awake and alert  Pain management: adequate  Airway patency: patent  Anesthetic complications: No anesthetic complications    Cardiovascular status: acceptable  Respiratory status: acceptable  Hydration status: acceptable    Comments: /64 (BP Location: Right arm, Patient Position: Lying)   Pulse 67   Resp 16   Ht 160 cm (63\")   Wt 48.6 kg (107 lb 1.6 oz)   SpO2 98%   BMI 18.97 kg/m²       "

## 2022-04-11 NOTE — OP NOTE
Operative Note :  Agnes Carlson MD      Jasmine Banegas  1945    Procedure Date: 04/11/22    Pre-op Diagnosis:  RLQ abdominal pain [R10.31]  Diverticulitis [K57.92]    Post-Operative Diagnosis:  Diverticulosis  Rigid sigmoid colon  Tortuous colon    Procedure:   Flexible colonoscopy to the cecum    Surgeon: Agnes Carlson MD    Assistant: None    Anesthesia:  MAC (monitored anesthetic care)    Estimated Blood Loss: Minimal    Specimens: None    Complications: None    Indications:  Ms. Banegas is a 76-year-old lady with a recent episode of diverticulitis who presents today for a colonoscopy.  She has been counseled on the risks of the procedure to include bleeding, possible colon perforation, and possible missed pathology.  Despite these risks, she has consented to proceed.    Findings: Very rigid and tortuous sigmoid colon made it hard to traverse to the cecum, and required repositioning of the patient under her back, sigmoid diverticulosis noted with no evidence of ongoing diverticulitis    Description of procedure:  The patient was brought to the endoscopy suite and ryne in the left lateral decubitus position.  Continuous propofol anesthesia was administered.  A surgical timeout was completed.  A digital rectal exam was performed, revealing no abnormalities.  An adult colonoscope was then inserted through the anus and passed under direct visualization to the level of the cecum.  She had a fairly rigid and tortuous sigmoid colon requiring reposition of the patient under her back with application of manual abdominal wall pressure to help minimize looping of the scope.  The cecum was identified via the ileocecal valve as well as the appendiceal orifice.  The scope was then slowly withdrawn, examining all circumferential walls of the ascending, transverse, descending, and sigmoid colon.  The only abnormality identified other than the rigidity of the sigmoid colon was diverticulosis of the sigmoid colon  with no evidence of bleeding or fecal impaction.  I was unable to retroflex the scope within the rectum, again due to the rigidity of her rectum/sigmoid colon.  The scope was then withdrawn and the colon desufflated.  The patient had a very good bowel prep and was transferred to the recovery area in stable condition.     Recommendations:  Given her age of 76 years, she will not require a repeat screening colonoscopy as she would be in her mid 80s and the risks would likely outweigh the benefits.    Agnes Carlson MD  General, Robotic, and Endoscopic Surgery  [facility]    [address] [place] [address], [address]  [place] [address]  P: [phone]  F: [phone]

## 2022-04-11 NOTE — H&P
General Surgery  History and Physical    CC: Abdominal pain, questionable diverticulitis     HPI: The patient is a pleasant 76 y.o. year-old lady who presents today for evaluation of a recent episode of acute onset right lower quadrant abdominal pain that felt like a burning sensation beginning suddenly at night while she was laying in bed on the evening of 2/11/2022.  The pain was sudden in onset with rapid worsening in severity.  The pain did not radiate and she had no associated nausea, vomiting, or change in her bowel habits.  She went to the emergency room where a CT abdomen/pelvis was performed and was read as having possible lower sigmoid diverticulitis along the right side of the colonic wall.  She was discharged to home on oral antibiotics and instructed to follow-up with her primary care provider.  She saw Dr. Duncan a few days later and then was referred to see me to discuss her diverticulitis.  Her last colonoscopy was done in 2008 with Dr. Camryn Pfeiffer and significant for sigmoid diverticulosis.  The antibiotic she took caused some diarrhea but she denies any blood in the stool.  She has made a full recovery and feels fine now that she has completed the antibiotics.     Past Medical History:   Hypertension  Hyperlipidemia  Glaucoma  Graves' disease  Postoperative hypothyroidism  Osteoarthritis  Osteoporosis  Postoperative nausea/vomiting (with Fentanyl/Versed and general anesthesia)     Past Surgical History:   Colonoscopy (2008, Dr. Camryn Pfeiffer - sigmoid diverticulosis)  Multiple eye surgeries  Left femur fracture repair  Bilateral hip replacement  Total thyroidectomy     Medications:   Calcium/magnesium/vitamin D supplement once daily  Coenzyme Q 10 200 mg daily  Fluticasone nasal spray as needed  Latanoprost eyedrops daily  Levothyroxine 75 mcg daily  Lisinopril 10 mg daily  Montelukast 10 mg daily  Multivitamin with zinc 50 mg daily  Multivitamin once daily omega-3 fatty acids 1280 mg  daily  Simvastatin 20 mg nightly  Timolol eyedrops daily     Allergies: Sulfa antibiotics (hives), Rocklatan (itching/swelling)     Family History: No family history of gastrointestinal malignancy, her mother had hypertension, father had coronary artery disease, and brother had alcohol abuse     Social History: Single, retired, non-smoker, social alcohol use on holidays only     ROS:   Constitutional: Negative for fevers or chills  HENT: Negative for hearing loss or runny nose  Eyes: Negative for vision changes or scleral icterus  Respiratory: Negative for cough or shortness of breath  Cardiovascular: Negative for chest pain or heart palpitations  Gastrointestinal: Positive for recent abdominal pain that has resolved; negative for abdominal distension, nausea, vomiting, constipation, melena, or hematochezia  Genitourinary: Negative for hematuria or dysuria  Musculoskeletal: Negative for joint swelling or gait instability  Neurologic: Negative for tremors or seizures  Psychiatric: Negative for suicidal ideations or agitation  All other systems reviewed and negative    Physical Exam:  Vitals:    04/11/22 1148   BP: 138/67   Pulse: 68   Resp: 16   SpO2: 97%     Height: 160 cm  Weight: 48 kg  BMI: 18.9  General: No acute distress, well-nourished & well-developed  HEAD: normocephalic, atraumatic  EYES: normal conjunctiva, sclera anicteric  EARS: grossly normal hearing  NECK: supple, no thyromegaly  CARDIOVASCULAR: regular rate and rhythm  RESPIRATORY: clear to auscultation bilaterally  GASTROINTESTINAL: soft, nontender, non-distended  PSYCHIATRIC: oriented x3, normal mood and affect    ASSESSMENT & PLAN  Ms. Banegas is a 76-year-old lady with a recent episode of diverticulitis who presents today for a colonoscopy.  She has been counseled on the risks of the procedure to include bleeding, possible colon perforation, and possible missed pathology.  Despite these risks, she has consented to proceed.    Agnes Carlson,  MD  General, Robotic, and Endoscopic Surgery  [facility]    [address], [address]  [place] [address]  P: [phone]  F: [phone]

## 2022-04-11 NOTE — ANESTHESIA PREPROCEDURE EVALUATION
Anesthesia Evaluation     Patient summary reviewed and Nursing notes reviewed   history of anesthetic complications: PONV  NPO Solid Status: > 8 hours  NPO Liquid Status: > 4 hours           Airway   Mallampati: II  Dental      Pulmonary - negative pulmonary ROS and normal exam   Cardiovascular - normal exam    (+) hypertension less than 2 medications, hyperlipidemia,       Neuro/Psych- negative ROS  GI/Hepatic/Renal/Endo    (+)   thyroid problem hypothyroidism    Musculoskeletal     Abdominal    Substance History      OB/GYN          Other   arthritis,                      Anesthesia Plan    ASA 2     MAC     intravenous induction     Anesthetic plan, all risks, benefits, and alternatives have been provided, discussed and informed consent has been obtained with: patient.        CODE STATUS:

## 2022-04-11 NOTE — DISCHARGE INSTRUCTIONS

## 2022-04-20 ENCOUNTER — TELEPHONE (OUTPATIENT)
Dept: SURGERY | Facility: CLINIC | Age: 77
End: 2022-04-20

## 2022-04-20 NOTE — TELEPHONE ENCOUNTER
Pt c-scope 4/11 pt still having a lot of pain with bowel movements & burning.  Once she has the BM she's ok?

## 2022-04-22 ENCOUNTER — OFFICE VISIT (OUTPATIENT)
Dept: SURGERY | Facility: CLINIC | Age: 77
End: 2022-04-22

## 2022-04-22 VITALS — WEIGHT: 107.4 LBS | HEIGHT: 63 IN | BODY MASS INDEX: 19.03 KG/M2

## 2022-04-22 DIAGNOSIS — R10.30 LOWER ABDOMINAL PAIN: Primary | ICD-10-CM

## 2022-04-22 PROCEDURE — 99213 OFFICE O/P EST LOW 20 MIN: CPT | Performed by: SURGERY

## 2022-05-11 NOTE — PROGRESS NOTES
General Surgery  Established Patient Office Visit    CC: Abdominal pain    HPI: The patient is a pleasant 76 y.o. year-old lady who returns to see me today after I performed a colonoscopy on her last week for work-up of potential diverticulitis.  Since the colonoscopy, she has been experiencing bilateral lower quadrant abdominal pain that comes on right before she has a bowel movement.  She says it is cramping and then almost immediately resolves as soon as she has a bowel movement.  She has had no fevers or chills and has not noticed any blood or mucus in her stool.  When she palpates her abdomen, it does not elicit any pain.  She called our office a few days ago regarding her symptoms and I asked her to come in today for evaluation.  In the interim, the pain has improved.  Her colonoscopy was significant for a fairly rigid rectosigmoid colon with diverticulosis but no evidence of persistent diverticulitis.    Past Medical History:   Hypertension  Hyperlipidemia  Glaucoma  Graves' disease  Postoperative hypothyroidism  Osteoarthritis  Osteoporosis  Postoperative nausea/vomiting (with fentanyl/Versed and general anesthesia)    Past Surgical History:   Colonoscopy (4/11/22 by me & 2008 by Dr. Camryn marcus-sigmoid diverticulosis)  Multiple eye surgeries  Left femur fracture repair  Bilateral hip replacement  Total thyroidectomy    Medications:   Calcium/magnesium/vitamin D supplement once daily  Coenzyme Q 10 200 mg daily  Fluticasone nasal spray as needed  Latanoprost eyedrops daily  Levothyroxine 75 mcg daily  Lisinopril 10 mg daily  Montelukast 10 mg daily  Multivitamin with zinc 50 mg daily  Multivitamin once daily omega-3 fatty acids 1280 mg daily  Simvastatin 20 mg nightly  Timolol eyedrops daily    Allergies: Sulfa antibiotics (hives), Rocklatan (itching/swelling)    Family History: No family history of gastrointestinal malignancy, her mother had hypertension, father had coronary artery disease, and brother had  alcohol abuse    Social History: Single, retired, non-smoker, social alcohol use on holidays only    ROS:   Constitutional: Negative for fevers or chills  HENT: Negative for hearing loss or runny nose  Eyes: Negative for vision changes or scleral icterus  Respiratory: Negative for cough or shortness of breath  Cardiovascular: Negative for chest pain or heart palpitations  Gastrointestinal: Positive for abdominal pain; negative for abdominal distension, nausea, vomiting, constipation, melena, or hematochezia  Genitourinary: Negative for hematuria or dysuria  Musculoskeletal: Negative for joint swelling or gait instability  Neurologic: Negative for tremors or seizures  Psychiatric: Negative for suicidal ideations or agitation  All other systems reviewed and negative    Physical Exam:  Height: 160 cm  Weight: 48.7 kg  BMI: 19  General: No acute distress, well-nourished & well-developed  HEAD: normocephalic, atraumatic  EYES: normal conjunctiva, sclera anicteric  EARS: grossly normal hearing  NECK: supple, no thyromegaly  CARDIOVASCULAR: regular rate and rhythm  RESPIRATORY: clear to auscultation bilaterally  GASTROINTESTINAL: soft, nontender, non-distended  MUSCULOSKELETAL: normal gait and station. No gross extremity abnormalities  PSYCHIATRIC: oriented x3, normal mood and affect    IMAGING:  CT ABDOMEN/PELVIS (2/11/2022):  IMPRESSION:  1.  Findings suggestive of mild diverticulitis involving the right aspect of the distal sigmoid colon in the appropriate clinical context. Correlation with patient history is recommended.  2.  Increased stranding within the mesentery and mid to lower aspect of the peritoneum. Findings are nonspecific but can be seen in the setting of mild enterocolitis.  3.  Left nephrolithiasis without hydronephrosis or visualized ureteral calculus. Please note that there are portions of the right ureter which are not discretely seen.  4.  Small hiatal hernia.    ASSESSMENT & PLAN  Ms. Banegas is a  76-year-old lady with bilateral lower quadrant cramping abdominal pain before defecation following her recent colonoscopy.  Because her pain is improving, I think it is fine to continue to monitor her symptoms and see nothing at this time that would warrant repeat antibiotics as there was no evidence for colitis or inflammation of the colonic diverticuli during her colonoscopy recently.  I advised her to call me back early next week if the pain is persistent as I would then like to check a CT abdomen/pelvis.  She expressed understanding and will follow-up with me as needed.    Agnes Carlson MD  General, Robotic, and Endoscopic Surgery  [facility]    [address], [address]  [place] [address]  P: [phone]  F: [phone]

## 2022-08-08 RX ORDER — LEVOTHYROXINE SODIUM 0.07 MG/1
TABLET ORAL
Qty: 90 TABLET | Refills: 0 | Status: SHIPPED | OUTPATIENT
Start: 2022-08-08 | End: 2022-11-08

## 2022-08-08 RX ORDER — MONTELUKAST SODIUM 10 MG/1
TABLET ORAL
Qty: 90 TABLET | Refills: 0 | Status: SHIPPED | OUTPATIENT
Start: 2022-08-08 | End: 2022-11-08

## 2022-08-08 RX ORDER — SIMVASTATIN 20 MG
TABLET ORAL
Qty: 90 TABLET | Refills: 0 | Status: SHIPPED | OUTPATIENT
Start: 2022-08-08 | End: 2022-11-08

## 2022-08-08 RX ORDER — FLUTICASONE PROPIONATE 50 MCG
SPRAY, SUSPENSION (ML) NASAL
Qty: 16 G | Refills: 0 | Status: SHIPPED | OUTPATIENT
Start: 2022-08-08 | End: 2022-11-08

## 2022-08-08 RX ORDER — LISINOPRIL 10 MG/1
TABLET ORAL
Qty: 90 TABLET | Refills: 0 | Status: SHIPPED | OUTPATIENT
Start: 2022-08-08 | End: 2022-11-08

## 2022-08-17 DIAGNOSIS — E03.9 ADULT HYPOTHYROIDISM: ICD-10-CM

## 2022-08-17 DIAGNOSIS — I10 ESSENTIAL HYPERTENSION: ICD-10-CM

## 2022-08-17 DIAGNOSIS — E78.5 HYPERLIPIDEMIA, UNSPECIFIED HYPERLIPIDEMIA TYPE: Primary | ICD-10-CM

## 2022-08-18 LAB
ALBUMIN SERPL-MCNC: 4.2 G/DL (ref 3.7–4.7)
ALBUMIN/GLOB SERPL: 1.9 {RATIO} (ref 1.2–2.2)
ALP SERPL-CCNC: 53 IU/L (ref 44–121)
ALT SERPL-CCNC: 18 IU/L (ref 0–32)
APPEARANCE UR: CLEAR
AST SERPL-CCNC: 25 IU/L (ref 0–40)
BACTERIA #/AREA URNS HPF: NORMAL /[HPF]
BASOPHILS # BLD AUTO: 0 X10E3/UL (ref 0–0.2)
BASOPHILS NFR BLD AUTO: 1 %
BILIRUB SERPL-MCNC: 0.6 MG/DL (ref 0–1.2)
BILIRUB UR QL STRIP: NEGATIVE
BUN SERPL-MCNC: 15 MG/DL (ref 8–27)
BUN/CREAT SERPL: 21 (ref 12–28)
CALCIUM SERPL-MCNC: 9.2 MG/DL (ref 8.7–10.3)
CASTS URNS QL MICRO: NORMAL /LPF
CHLORIDE SERPL-SCNC: 101 MMOL/L (ref 96–106)
CHOLEST SERPL-MCNC: 138 MG/DL (ref 100–199)
CO2 SERPL-SCNC: 26 MMOL/L (ref 20–29)
COLOR UR: YELLOW
CREAT SERPL-MCNC: 0.7 MG/DL (ref 0.57–1)
EGFRCR-CYS SERPLBLD CKD-EPI 2021: 90 ML/MIN/1.73
EOSINOPHIL # BLD AUTO: 0.1 X10E3/UL (ref 0–0.4)
EOSINOPHIL NFR BLD AUTO: 2 %
EPI CELLS #/AREA URNS HPF: NORMAL /HPF (ref 0–10)
ERYTHROCYTE [DISTWIDTH] IN BLOOD BY AUTOMATED COUNT: 14 % (ref 11.7–15.4)
GLOBULIN SER CALC-MCNC: 2.2 G/DL (ref 1.5–4.5)
GLUCOSE SERPL-MCNC: 80 MG/DL (ref 65–99)
GLUCOSE UR QL STRIP: NEGATIVE
HCT VFR BLD AUTO: 38.5 % (ref 34–46.6)
HDLC SERPL-MCNC: 52 MG/DL
HGB BLD-MCNC: 12.6 G/DL (ref 11.1–15.9)
HGB UR QL STRIP: NEGATIVE
IMM GRANULOCYTES # BLD AUTO: 0 X10E3/UL (ref 0–0.1)
IMM GRANULOCYTES NFR BLD AUTO: 0 %
KETONES UR QL STRIP: NEGATIVE
LDLC SERPL CALC-MCNC: 76 MG/DL (ref 0–99)
LEUKOCYTE ESTERASE UR QL STRIP: NEGATIVE
LYMPHOCYTES # BLD AUTO: 1.9 X10E3/UL (ref 0.7–3.1)
LYMPHOCYTES NFR BLD AUTO: 32 %
MCH RBC QN AUTO: 29.8 PG (ref 26.6–33)
MCHC RBC AUTO-ENTMCNC: 32.7 G/DL (ref 31.5–35.7)
MCV RBC AUTO: 91 FL (ref 79–97)
MICRO URNS: NORMAL
MICRO URNS: NORMAL
MONOCYTES # BLD AUTO: 0.6 X10E3/UL (ref 0.1–0.9)
MONOCYTES NFR BLD AUTO: 10 %
NEUTROPHILS # BLD AUTO: 3.3 X10E3/UL (ref 1.4–7)
NEUTROPHILS NFR BLD AUTO: 55 %
NITRITE UR QL STRIP: NEGATIVE
PH UR STRIP: 7.5 [PH] (ref 5–7.5)
PLATELET # BLD AUTO: 163 X10E3/UL (ref 150–450)
POTASSIUM SERPL-SCNC: 4.2 MMOL/L (ref 3.5–5.2)
PROT SERPL-MCNC: 6.4 G/DL (ref 6–8.5)
PROT UR QL STRIP: NEGATIVE
RBC # BLD AUTO: 4.23 X10E6/UL (ref 3.77–5.28)
RBC #/AREA URNS HPF: NORMAL /HPF (ref 0–2)
SODIUM SERPL-SCNC: 141 MMOL/L (ref 134–144)
SP GR UR STRIP: 1.01 (ref 1–1.03)
T4 FREE SERPL-MCNC: 1.53 NG/DL (ref 0.82–1.77)
TRIGL SERPL-MCNC: 43 MG/DL (ref 0–149)
TSH SERPL DL<=0.005 MIU/L-ACNC: 0.44 UIU/ML (ref 0.45–4.5)
UROBILINOGEN UR STRIP-MCNC: 0.2 MG/DL (ref 0.2–1)
VLDLC SERPL CALC-MCNC: 10 MG/DL (ref 5–40)
WBC # BLD AUTO: 5.8 X10E3/UL (ref 3.4–10.8)
WBC #/AREA URNS HPF: NORMAL /HPF (ref 0–5)

## 2022-08-24 ENCOUNTER — OFFICE VISIT (OUTPATIENT)
Dept: INTERNAL MEDICINE | Facility: CLINIC | Age: 77
End: 2022-08-24

## 2022-08-24 VITALS
OXYGEN SATURATION: 98 % | DIASTOLIC BLOOD PRESSURE: 80 MMHG | BODY MASS INDEX: 18.07 KG/M2 | SYSTOLIC BLOOD PRESSURE: 124 MMHG | HEIGHT: 63 IN | WEIGHT: 102 LBS | HEART RATE: 78 BPM

## 2022-08-24 DIAGNOSIS — U07.1 COVID-19 VIRUS DETECTED: ICD-10-CM

## 2022-08-24 DIAGNOSIS — M81.0 OSTEOPOROSIS, UNSPECIFIED OSTEOPOROSIS TYPE, UNSPECIFIED PATHOLOGICAL FRACTURE PRESENCE: ICD-10-CM

## 2022-08-24 DIAGNOSIS — Z00.00 WELL ADULT EXAM: ICD-10-CM

## 2022-08-24 DIAGNOSIS — Z00.00 MEDICARE ANNUAL WELLNESS VISIT, SUBSEQUENT: Primary | ICD-10-CM

## 2022-08-24 DIAGNOSIS — Z12.31 ENCOUNTER FOR SCREENING MAMMOGRAM FOR BREAST CANCER: ICD-10-CM

## 2022-08-24 PROBLEM — Z87.19 HISTORY OF DIVERTICULITIS: Chronic | Status: ACTIVE | Noted: 2022-03-08

## 2022-08-24 PROBLEM — R10.31 RLQ ABDOMINAL PAIN: Status: RESOLVED | Noted: 2022-03-08 | Resolved: 2022-08-24

## 2022-08-24 PROBLEM — Z87.19 HISTORY OF DIVERTICULITIS: Status: ACTIVE | Noted: 2022-03-08

## 2022-08-24 LAB
EXPIRATION DATE: ABNORMAL
INTERNAL CONTROL: ABNORMAL
Lab: ABNORMAL
SARS-COV-2 AG UPPER RESP QL IA.RAPID: DETECTED

## 2022-08-24 PROCEDURE — 1170F FXNL STATUS ASSESSED: CPT | Performed by: INTERNAL MEDICINE

## 2022-08-24 PROCEDURE — 1126F AMNT PAIN NOTED NONE PRSNT: CPT | Performed by: INTERNAL MEDICINE

## 2022-08-24 PROCEDURE — 87426 SARSCOV CORONAVIRUS AG IA: CPT | Performed by: INTERNAL MEDICINE

## 2022-08-24 PROCEDURE — 96160 PT-FOCUSED HLTH RISK ASSMT: CPT | Performed by: INTERNAL MEDICINE

## 2022-08-24 PROCEDURE — 99397 PER PM REEVAL EST PAT 65+ YR: CPT | Performed by: INTERNAL MEDICINE

## 2022-08-24 PROCEDURE — 1160F RVW MEDS BY RX/DR IN RCRD: CPT | Performed by: INTERNAL MEDICINE

## 2022-08-24 PROCEDURE — G0439 PPPS, SUBSEQ VISIT: HCPCS | Performed by: INTERNAL MEDICINE

## 2022-08-24 RX ORDER — TIMOLOL MALEATE 5 MG/ML
SOLUTION/ DROPS OPHTHALMIC
COMMUNITY
Start: 2022-08-05

## 2022-08-24 NOTE — PROGRESS NOTES
The ABCs of the Annual Wellness Visit  Subsequent Medicare Wellness Visit    Chief Complaint   Patient presents with   • Medicare Wellness-subsequent   • Annual Exam      Subjective    History of Present Illness:  Jasmine Banegas is a 77 y.o. female who presents for a Subsequent Medicare Wellness Visit.    The following data was reviewed by: Chanel Duncan MD on 08/24/2022:  Common labs    Common Labsle 2/9/22 2/11/22 2/11/22 8/17/22 8/17/22 8/17/22     1155 1155 0919 0919 0919   Glucose 79  112 (A)  80    BUN 16  12  15    Creatinine 0.75  0.65  0.70    eGFR Non  Am 78  89      eGFR African Am 90        Sodium 139  133 (A)  141    Potassium 4.7  3.8  4.2    Chloride 102  95 (A)  101    Calcium 9.0  8.9  9.2    Total Protein     6.4    Albumin   4.60  4.2    Total Bilirubin   1.0  0.6    Alkaline Phosphatase   53  53    AST (SGOT)   22  25    ALT (SGPT)   14  18    WBC  9.36  5.8     Hemoglobin  13.9  12.6     Hematocrit  41.4  38.5     Platelets  153  163     Total Cholesterol      138   Triglycerides      43   HDL Cholesterol      52   LDL Cholesterol       76   (A) Abnormal value       Comments are available for some flowsheets but are not being displayed.           HTN.  Control is good.  HLD.  Excellent control.  Hypothyroidism.  TSH is good.   OP.  She is maintained on Prolia.      C/o head and nasal congestion.  Sneezing.  Ear pain and pressure.     The following portions of the patient's history were reviewed and   updated as appropriate: allergies, current medications, past family history, past medical history, past social history, past surgical history and problem list.    Compared to one year ago, the patient feels her physical   health is the same.    Compared to one year ago, the patient feels her mental   health is the same.    Recent Hospitalizations:  She was not admitted to the hospital during the last year.       Current Medical Providers:  Patient Care Team:  Chanel Duncan MD as PCP  - Chanel Villalobos MD as PCP - Family Medicine    Outpatient Medications Prior to Visit   Medication Sig Dispense Refill   • Calcium-Magnesium-Vitamin D - MG-MG-UNIT tablet sustained-release 24 hour Take 1 tablet by mouth Every Morning.     • Coenzyme Q10 (CO Q 10 PO) Take 200 mg by mouth Daily.     • fluticasone (FLONASE) 50 MCG/ACT nasal spray USE 2 SPRAYS IN EACH NOSTRIL EVERY DAY AS DIRECTED BY PROVIDER 16 g 0   • latanoprost (XALATAN) 0.005 % ophthalmic solution Administer 1 drop into the left eye Every Night.     • levothyroxine (SYNTHROID, LEVOTHROID) 75 MCG tablet Take 1 tablet by mouth once daily 90 tablet 0   • lisinopril (PRINIVIL,ZESTRIL) 10 MG tablet Take 1 tablet by mouth once daily 90 tablet 0   • montelukast (SINGULAIR) 10 MG tablet Take 1 tablet by mouth once daily 90 tablet 0   • Multiple Vitamins-Minerals (ZINC PO) Take 50 mg by mouth Daily.     • multivitamin with minerals tablet tablet Take 1 tablet by mouth Daily.     • Omega-3 Fatty Acids (OMEGA 3 PO) Take 1,280 mg by mouth Daily.     • simvastatin (ZOCOR) 20 MG tablet TAKE 1 TABLET BY MOUTH EVERY DAY AT BEDTIME 90 tablet 0   • timolol (TIMOPTIC) 0.5 % ophthalmic solution INSTILL 1 DROP INTO EACH EYE TWICE DAILY     • timolol (TIMOPTIC) 0.25 % ophthalmic solution Administer 1 drop to both eyes 2 (Two) Times a Day.       No facility-administered medications prior to visit.       No opioid medication identified on active medication list. I have reviewed chart for other potential  high risk medication/s and harmful drug interactions in the elderly.          Aspirin is not on active medication list.  Aspirin use is not indicated based on review of current medical condition/s. Risk of harm outweighs potential benefits.  .    Patient Active Problem List   Diagnosis   • HLD (hyperlipidemia)   • Adult hypothyroidism   • OP (osteoporosis)   • Essential hypertension   • Overactive bladder   • Glaucoma of both eyes   •  "Osteoarthritis of knee   • Graves' eye disease   • History of diverticulitis       Review of Systems   Respiratory: Negative.    Cardiovascular: Negative.         Objective    Vitals:    08/24/22 1123   BP: 124/80   Pulse: 78   SpO2: 98%   Weight: 46.3 kg (102 lb)   Height: 160 cm (62.99\")   PainSc: 0-No pain     Estimated body mass index is 18.07 kg/m² as calculated from the following:    Height as of this encounter: 160 cm (62.99\").    Weight as of this encounter: 46.3 kg (102 lb).    BMI is below normal parameters (malnutrition). Recommendations: treating the underlying disease process      Does the patient have evidence of cognitive impairment? No    Physical Exam  Constitutional:       Appearance: She is well-developed.   HENT:      Head: Normocephalic and atraumatic.      Right Ear: Hearing normal. Tympanic membrane is erythematous.      Left Ear: Hearing normal. Tympanic membrane is erythematous.      Mouth/Throat:      Pharynx: No oropharyngeal exudate or posterior oropharyngeal erythema.   Cardiovascular:      Rate and Rhythm: Normal rate and regular rhythm.      Heart sounds: Normal heart sounds.   Pulmonary:      Effort: Pulmonary effort is normal.      Breath sounds: Normal breath sounds.   Abdominal:      General: There is no distension.      Palpations: Abdomen is soft. There is no mass.      Tenderness: There is no abdominal tenderness. There is no guarding or rebound.   Skin:     General: Skin is warm and dry.   Neurological:      Mental Status: She is alert and oriented to person, place, and time.   Psychiatric:         Behavior: Behavior normal.       Lab Results   Component Value Date    CHLPL 138 08/17/2022    TRIG 43 08/17/2022    HDL 52 08/17/2022    LDL 76 08/17/2022    VLDL 10 08/17/2022            HEALTH RISK ASSESSMENT    Smoking Status:  Social History     Tobacco Use   Smoking Status Never Smoker   Smokeless Tobacco Never Used     Alcohol Consumption:  Social History     Substance and " Sexual Activity   Alcohol Use Yes    Comment: HOLIDAY DRINKER     Fall Risk Screen:    JAY Fall Risk Assessment was completed, and patient is at LOW risk for falls.Assessment completed on:8/24/2022    Depression Screening:  PHQ-2/PHQ-9 Depression Screening 8/24/2022   Retired PHQ-9 Total Score -   Retired Total Score -   Little Interest or Pleasure in Doing Things 0-->not at all   Feeling Down, Depressed or Hopeless 0-->not at all   PHQ-9: Brief Depression Severity Measure Score 0       Health Habits and Functional and Cognitive Screening:  Functional & Cognitive Status 8/24/2022   Do you have difficulty preparing food and eating? No   Do you have difficulty bathing yourself, getting dressed or grooming yourself? No   Do you have difficulty using the toilet? No   Do you have difficulty moving around from place to place? No   Do you have trouble with steps or getting out of a bed or a chair? No   Current Diet Well Balanced Diet   Dental Exam Up to date   Eye Exam Up to date   Exercise (times per week) 2 times per week   Current Exercises Include Walking   Current Exercise Activities Include -   Do you need help using the phone?  No   Are you deaf or do you have serious difficulty hearing?  No   Do you need help with transportation? No   Do you need help shopping? No   Do you need help preparing meals?  No   Do you need help with housework?  No   Do you need help with laundry? No   Do you need help taking your medications? No   Do you need help managing money? No   Do you ever drive or ride in a car without wearing a seat belt? No   Have you felt unusual stress, anger or loneliness in the last month? No   Who do you live with? Alone   If you need help, do you have trouble finding someone available to you? No   Have you been bothered in the last four weeks by sexual problems? No   Do you have difficulty concentrating, remembering or making decisions? No       Age-appropriate Screening Schedule:  Refer to the list  below for future screening recommendations based on patient's age, sex and/or medical conditions. Orders for these recommended tests are listed in the plan section. The patient has been provided with a written plan.    Health Maintenance   Topic Date Due   • ZOSTER VACCINE (1 of 2) Never done   • INFLUENZA VACCINE  10/01/2022   • TDAP/TD VACCINES (2 - Td or Tdap) 10/01/2022   • MAMMOGRAM  05/26/2023   • DXA SCAN  05/26/2023   • LIPID PANEL  08/17/2023              Assessment & Plan   CMS Preventative Services Quick Reference  Risk Factors Identified During Encounter  Immunizations Discussed/Encouraged (specific Immunizations; COVID19  The above risks/problems have been discussed with the patient.  Follow up actions/plans if indicated are seen below in the Assessment/Plan Section.  Pertinent information has been shared with the patient in the After Visit Summary.    Diagnoses and all orders for this visit:    1. Medicare annual wellness visit, subsequent (Primary)    2. Well adult exam    3. COVID-19 virus detected    4. Osteoporosis, unspecified osteoporosis type, unspecified pathological fracture presence  -     Ambulatory Referral to [facility] For Infusion Treatment    5. Encounter for screening mammogram for breast cancer  -     Mammo Screening Digital Tomosynthesis Bilateral With CAD; Future    Other orders  -     Nirmatrelvir&Ritonavir 300/100 (PAXLOVID) 20 x 150 MG & 10 x 100MG tablet therapy pack tablet; Take 3 tablets by mouth 2 (Two) Times a Day for 5 days.  Dispense: 30 tablet; Refill: 0      I recommend attention to rest and fluids. I recommend as needed tylenol for fevers and aches. I recommend adding OTC vitamin D, C and zinc. I recommend 81 mg of aspirin daily unless this is any contraindication. You should quarantine for ten days.  It can be five days if you are feeling better and fever free but you should continue to wear a mask for a total of ten days around others. Paxlovid is an FDA approved for  emergency use.  Important considerations include numerous drug interactions.  Reasons to go to the ER include severe trouble breathing, chest pain, confusion, inability to wake or stay awake, and bluish lips or face.  Continue supportive measures and let me know if there is any change in symptoms.      Follow Up:   Return in about 6 months (around 2/24/2023) for Recheck.     An After Visit Summary and PPPS were made available to the patient.

## 2022-08-24 NOTE — PATIENT INSTRUCTIONS
Medicare Wellness  Personal Prevention Plan of Service     Date of Office Visit:    Encounter Provider:  Chanel Duncan MD  Place of Service:  [facility]  Patient Name: Jasmine Banegas  :  1945    As part of the Medicare Wellness portion of your visit today, we are providing you with this personalized preventive plan of services (PPPS). This plan is based upon recommendations of the [place] Preventive Services Task Force (USPSTF) and the Advisory Committee on Immunization Practices (ACIP).    This lists the preventive care services that should be considered, and provides dates of when you are due. Items listed as completed are up-to-date and do not require any further intervention.    Health Maintenance   Topic Date Due    ZOSTER VACCINE (1 of 2) Never done    COVID-19 Vaccine (4 - Booster for Pfizer series) 05/15/2022    ANNUAL WELLNESS VISIT  2022    INFLUENZA VACCINE  10/01/2022    TDAP/TD VACCINES (2 - Td or Tdap) 10/01/2022    MAMMOGRAM  2023    DXA SCAN  2023    LIPID PANEL  2023    COLORECTAL CANCER SCREENING  2032    HEPATITIS C SCREENING  Completed    Pneumococcal Vaccine 65+  Completed       Orders Placed This Encounter   Procedures    Ambulatory Referral to [facility] For Infusion Treatment     Referral Priority:   Routine     Referral Type:   Infusion     Referral Reason:   Specialty Services Required     Number of Visits Requested:   1       No follow-ups on file.

## 2022-08-31 DIAGNOSIS — M81.0 OSTEOPOROSIS, UNSPECIFIED OSTEOPOROSIS TYPE, UNSPECIFIED PATHOLOGICAL FRACTURE PRESENCE: Primary | ICD-10-CM

## 2022-09-01 ENCOUNTER — DOCUMENTATION (OUTPATIENT)
Dept: ONCOLOGY | Facility: HOSPITAL | Age: 77
End: 2022-09-01

## 2022-09-01 NOTE — NURSING NOTE
Labs done 8/17 sufficient for prolia.  No additional labs needed and ones signed in plan cancelled.

## 2022-09-06 ENCOUNTER — INFUSION (OUTPATIENT)
Dept: ONCOLOGY | Facility: HOSPITAL | Age: 77
End: 2022-09-06

## 2022-09-06 VITALS — BODY MASS INDEX: 18.36 KG/M2 | TEMPERATURE: 97.7 F | HEIGHT: 63 IN | RESPIRATION RATE: 18 BRPM

## 2022-09-06 DIAGNOSIS — M81.0 OSTEOPOROSIS, UNSPECIFIED OSTEOPOROSIS TYPE, UNSPECIFIED PATHOLOGICAL FRACTURE PRESENCE: Primary | ICD-10-CM

## 2022-09-06 PROCEDURE — 25010000002 DENOSUMAB 60 MG/ML SOLUTION PREFILLED SYRINGE: Performed by: INTERNAL MEDICINE

## 2022-09-06 PROCEDURE — 96372 THER/PROPH/DIAG INJ SC/IM: CPT

## 2022-09-06 RX ADMIN — DENOSUMAB 60 MG: 60 INJECTION SUBCUTANEOUS at 13:33

## 2022-09-06 NOTE — NURSING NOTE
Arrived for prolia injection. Indication and side effects reviewed. Denies recent dental work. Labs and medications verified. Prolia administered in left arm without incidence. Instructed to call prescribing MD for any concerns or questions and instructed on how to schedule future appts.  Pt vu and discharged in stable condition.

## 2022-10-04 ENCOUNTER — APPOINTMENT (OUTPATIENT)
Dept: MAMMOGRAPHY | Facility: HOSPITAL | Age: 77
End: 2022-10-04

## 2022-10-24 NOTE — PROGRESS NOTES
Subjective     Jasmine Banegas is a 71 y.o. female who presents for an annual wellness visit/cpe as well as check up of hld, hypothyroidism, op.      History of Present Illness     OP. Discussed recent BMD results. There is some improvement on Prolia.   Hypothyroidism. She could be under better control.   HLD.  Good control with simvastatin.     Review of Systems   Constitutional: Negative.    HENT: Negative.    Eyes: Negative.    Respiratory: Negative.    Cardiovascular: Negative.    Gastrointestinal: Negative.    Endocrine: Negative.    Genitourinary: Negative.    Musculoskeletal: Negative.    Skin: Negative.    Allergic/Immunologic: Negative.    Neurological: Negative.    Hematological: Negative.    Psychiatric/Behavioral: Negative.        The following portions of the patient's history were reviewed and updated as appropriate: allergies, current medications, past family history, past medical history, past social history, past surgical history and problem list.  Health maintenance tab was reviewed and updated with the patient.       Patient Active Problem List    Diagnosis Date Noted   • Glaucoma of both eyes 03/14/2017   • Overactive bladder 03/07/2016   • HLD (hyperlipidemia) 02/25/2016     Note Last Updated: 2/25/2016     Description: Statins started in 2/2014 secondary to 7.6% 10 year ASCVD risk.     • Adult hypothyroidism 02/25/2016   • OP (osteoporosis) 02/25/2016     Note Last Updated: 2/25/2016     Description: Fosamax [id number]  Prolia started in 2014. Recommend 1200mg calcium and 1000IUs D in diet/supplements.     • Blood pressure elevated without history of HTN 02/25/2016     Note Last Updated: 2/25/2016     Impression: 02/12/2014 - Blood pressure is well-controlled when not in the office.  I do encourage to check regularly.;          Past Medical History   Diagnosis Date   • Graves disease    • Graves disease    • Graves disease    • Hyperlipidemia    • Hypertension    • Lower back pain    •  "Osteoporosis    • Overactive bladder    • RA (rheumatoid arthritis)        Past Surgical History   Procedure Laterality Date   • Thyroid surgery         Family History   Problem Relation Age of Onset   • Hypertension Mother    • Heart disease Father    • Alcohol abuse Brother        Social History     Social History   • Marital status: Single     Spouse name: N/A   • Number of children: N/A   • Years of education: N/A     Occupational History   • Not on file.     Social History Main Topics   • Smoking status: Former Smoker   • Smokeless tobacco: Former User     Quit date: 1/1/1966   • Alcohol use Yes      Comment: rare   • Drug use: Not on file   • Sexual activity: Not on file     Other Topics Concern   • Not on file     Social History Narrative       Current Outpatient Prescriptions on File Prior to Visit   Medication Sig Dispense Refill   • denosumab (PROLIA) 60 MG/ML solution syringe Inject  under the skin.     • levothyroxine (SYNTHROID, LEVOTHROID) 75 MCG tablet Take 1 tablet by mouth daily. 90 tablet 3   • montelukast (SINGULAIR) 10 MG tablet TAKE ONE TABLET BY MOUTH ONCE DAILY 90 tablet 0   • simvastatin (ZOCOR) 20 MG tablet TAKE ONE TABLET BY MOUTH AT BEDTIME 90 tablet 0     No current facility-administered medications on file prior to visit.        Allergies   Allergen Reactions   • Sulfa Antibiotics        Immunization History   Administered Date(s) Administered   • Pneumococcal Conjugate 13-Valent 03/19/2015, 03/07/2016   • Tdap 10/01/2012       Objective     Visit Vitals   • /88   • Pulse 68   • Ht 63\" (160 cm)   • Wt 121 lb (54.9 kg)   • LMP  (Approximate)   • SpO2 97%   • BMI 21.43 kg/m2         Physical Exam   Constitutional: She is oriented to person, place, and time. She appears well-developed and well-nourished.   HENT:   Head: Normocephalic and atraumatic.   Right Ear: Hearing, tympanic membrane and external ear normal.   Left Ear: Hearing, tympanic membrane and external ear normal.   Nose: " Nose normal.   Mouth/Throat: Oropharynx is clear and moist.   Neck: Neck supple. No thyromegaly present.   Cardiovascular: Normal rate, regular rhythm and normal heart sounds.    No murmur heard.  Pulmonary/Chest: Effort normal and breath sounds normal. Right breast exhibits no mass. Left breast exhibits no mass.   Abdominal: Soft. She exhibits no distension. There is no hepatosplenomegaly. There is no tenderness.   Genitourinary: No breast tenderness.   Lymphadenopathy:     She has no cervical adenopathy.   Neurological: She is alert and oriented to person, place, and time.   Skin: Skin is warm and dry.   Psychiatric: She has a normal mood and affect.       ECG 12 Lead  Date/Time: 3/14/2017 8:30 AM  Performed by: VALDEZ MIN  Authorized by: VALDEZ MIN   Comparison: not compared with previous ECG   Previous ECG: no previous ECG available  Rhythm: sinus rhythm  Rate: normal  Conduction: conduction normal  ST Segments: ST segments normal  T Waves: T waves normal  QRS axis: normal  Clinical impression: normal ECG            Assessment/Plan   Jasmine was seen today for annual wellness visit.    Diagnoses and all orders for this visit:    Medicare annual wellness visit, subsequent    Well adult exam    Glaucoma of both eyes, unspecified glaucoma  -     Ambulatory Referral to Ophthalmology    Hyperlipidemia, unspecified hyperlipidemia type    Adult hypothyroidism    OP (osteoporosis)    Need for hepatitis C screening test  -     Hepatitis C Antibody        Discussion    AWV.  See scanned forms for rosa history, PHQ-9, functional ability questionnaire, cognitive impairment screening.  These were all reviewed with the patient and the patient was provided with a personal prevention plan of service in patient instructions.  Patient was given advice or information on the following topics:  nutrition, exercise  OP.  Continue Prolia.   HLD.  Continue simvastatin.   Hypothyroidism.  Continue levothyroxine.     Health  Maintenance   Topic Date Due   • HEPATITIS C SCREENING  02/25/2016   • ZOSTER VACCINE  02/25/2016   • PNEUMOCOCCAL VACCINES (65+ LOW/MEDIUM RISK) (2 of 2 - PPSV23) 03/07/2017   • DXA SCAN  02/28/2018   • LIPID PANEL  03/03/2018   • MEDICARE ANNUAL WELLNESS  03/14/2018   • MAMMOGRAM  09/22/2018   • COLONOSCOPY  10/31/2018   • TDAP/TD VACCINES (2 - Td) 10/01/2022   • INFLUENZA VACCINE  Excluded            Future Appointments  Date Time Provider Department Center   3/22/2017 1:30 PM ROOM 1 [facility]   9/15/2017 10:00 AM [facility] None   3/12/2018 8:40 AM [facility] None   3/19/2018 7:45 AM Chanel Duncan MD [facility] None              Detail Level: Generalized Detail Level: Detailed Detail Level: Simple

## 2022-11-01 ENCOUNTER — HOSPITAL ENCOUNTER (OUTPATIENT)
Dept: MAMMOGRAPHY | Facility: HOSPITAL | Age: 77
Discharge: HOME | End: 2022-11-01
Admitting: INTERNAL MEDICINE

## 2022-11-01 DIAGNOSIS — Z12.31 ENCOUNTER FOR SCREENING MAMMOGRAM FOR BREAST CANCER: ICD-10-CM

## 2022-11-01 PROCEDURE — 77067 SCR MAMMO BI INCL CAD: CPT

## 2022-11-01 PROCEDURE — 77063 BREAST TOMOSYNTHESIS BI: CPT

## 2022-11-08 RX ORDER — FLUTICASONE PROPIONATE 50 MCG
SPRAY, SUSPENSION (ML) NASAL
Qty: 16 G | Refills: 0 | Status: SHIPPED | OUTPATIENT
Start: 2022-11-08 | End: 2022-12-16

## 2022-11-08 RX ORDER — LEVOTHYROXINE SODIUM 0.07 MG/1
TABLET ORAL
Qty: 90 TABLET | Refills: 0 | Status: SHIPPED | OUTPATIENT
Start: 2022-11-08 | End: 2023-02-03

## 2022-11-08 RX ORDER — MONTELUKAST SODIUM 10 MG/1
TABLET ORAL
Qty: 90 TABLET | Refills: 0 | Status: SHIPPED | OUTPATIENT
Start: 2022-11-08 | End: 2023-02-03

## 2022-11-08 RX ORDER — LISINOPRIL 10 MG/1
TABLET ORAL
Qty: 90 TABLET | Refills: 0 | Status: SHIPPED | OUTPATIENT
Start: 2022-11-08 | End: 2023-02-03

## 2022-11-08 RX ORDER — SIMVASTATIN 20 MG
TABLET ORAL
Qty: 90 TABLET | Refills: 0 | Status: SHIPPED | OUTPATIENT
Start: 2022-11-08 | End: 2023-02-03

## 2022-12-16 RX ORDER — FLUTICASONE PROPIONATE 50 MCG
SPRAY, SUSPENSION (ML) NASAL
Qty: 16 G | Refills: 0 | Status: SHIPPED | OUTPATIENT
Start: 2022-12-16 | End: 2022-12-20 | Stop reason: SDUPTHER

## 2022-12-20 RX ORDER — FLUTICASONE PROPIONATE 50 MCG
2 SPRAY, SUSPENSION (ML) NASAL DAILY
Qty: 16 G | Refills: 5 | Status: SHIPPED | OUTPATIENT
Start: 2022-12-20

## 2023-02-03 RX ORDER — LISINOPRIL 10 MG/1
TABLET ORAL
Qty: 90 TABLET | Refills: 0 | Status: SHIPPED | OUTPATIENT
Start: 2023-02-03

## 2023-02-03 RX ORDER — LEVOTHYROXINE SODIUM 0.07 MG/1
TABLET ORAL
Qty: 90 TABLET | Refills: 0 | Status: SHIPPED | OUTPATIENT
Start: 2023-02-03

## 2023-02-03 RX ORDER — MONTELUKAST SODIUM 10 MG/1
TABLET ORAL
Qty: 90 TABLET | Refills: 0 | Status: SHIPPED | OUTPATIENT
Start: 2023-02-03

## 2023-02-03 RX ORDER — SIMVASTATIN 20 MG
TABLET ORAL
Qty: 90 TABLET | Refills: 0 | Status: SHIPPED | OUTPATIENT
Start: 2023-02-03

## 2023-02-22 DIAGNOSIS — E78.5 HYPERLIPIDEMIA, UNSPECIFIED HYPERLIPIDEMIA TYPE: Primary | ICD-10-CM

## 2023-02-22 DIAGNOSIS — I10 ESSENTIAL HYPERTENSION: ICD-10-CM

## 2023-02-22 DIAGNOSIS — E03.9 ADULT HYPOTHYROIDISM: ICD-10-CM

## 2023-02-22 LAB
ALBUMIN SERPL-MCNC: 4.4 G/DL (ref 3.5–5.2)
ALBUMIN/GLOB SERPL: 2 G/DL
ALP SERPL-CCNC: 49 U/L (ref 39–117)
ALT SERPL-CCNC: 16 U/L (ref 1–33)
AST SERPL-CCNC: 26 U/L (ref 1–32)
BASOPHILS # BLD AUTO: 0.05 10*3/MM3 (ref 0–0.2)
BASOPHILS NFR BLD AUTO: 0.8 % (ref 0–1.5)
BILIRUB SERPL-MCNC: 0.5 MG/DL (ref 0–1.2)
BUN SERPL-MCNC: 17 MG/DL (ref 8–23)
BUN/CREAT SERPL: 23.6 (ref 7–25)
CALCIUM SERPL-MCNC: 9.5 MG/DL (ref 8.6–10.5)
CHLORIDE SERPL-SCNC: 105 MMOL/L (ref 98–107)
CHOLEST SERPL-MCNC: 151 MG/DL (ref 0–200)
CO2 SERPL-SCNC: 29.7 MMOL/L (ref 22–29)
CREAT SERPL-MCNC: 0.72 MG/DL (ref 0.57–1)
EGFRCR SERPLBLD CKD-EPI 2021: 86.8 ML/MIN/1.73
EOSINOPHIL # BLD AUTO: 0.09 10*3/MM3 (ref 0–0.4)
EOSINOPHIL NFR BLD AUTO: 1.5 % (ref 0.3–6.2)
ERYTHROCYTE [DISTWIDTH] IN BLOOD BY AUTOMATED COUNT: 12.9 % (ref 12.3–15.4)
GLOBULIN SER CALC-MCNC: 2.2 GM/DL
GLUCOSE SERPL-MCNC: 86 MG/DL (ref 65–99)
HCT VFR BLD AUTO: 39.2 % (ref 34–46.6)
HDLC SERPL-MCNC: 67 MG/DL (ref 40–60)
HGB BLD-MCNC: 13.1 G/DL (ref 12–15.9)
IMM GRANULOCYTES # BLD AUTO: 0.01 10*3/MM3 (ref 0–0.05)
IMM GRANULOCYTES NFR BLD AUTO: 0.2 % (ref 0–0.5)
LDLC SERPL CALC-MCNC: 74 MG/DL (ref 0–100)
LYMPHOCYTES # BLD AUTO: 1.67 10*3/MM3 (ref 0.7–3.1)
LYMPHOCYTES NFR BLD AUTO: 28 % (ref 19.6–45.3)
MCH RBC QN AUTO: 30.3 PG (ref 26.6–33)
MCHC RBC AUTO-ENTMCNC: 33.4 G/DL (ref 31.5–35.7)
MCV RBC AUTO: 90.5 FL (ref 79–97)
MONOCYTES # BLD AUTO: 0.49 10*3/MM3 (ref 0.1–0.9)
MONOCYTES NFR BLD AUTO: 8.2 % (ref 5–12)
NEUTROPHILS # BLD AUTO: 3.66 10*3/MM3 (ref 1.7–7)
NEUTROPHILS NFR BLD AUTO: 61.3 % (ref 42.7–76)
NRBC BLD AUTO-RTO: 0 /100 WBC (ref 0–0.2)
PLATELET # BLD AUTO: 159 10*3/MM3 (ref 140–450)
POTASSIUM SERPL-SCNC: 5.2 MMOL/L (ref 3.5–5.2)
PROT SERPL-MCNC: 6.6 G/DL (ref 6–8.5)
RBC # BLD AUTO: 4.33 10*6/MM3 (ref 3.77–5.28)
SODIUM SERPL-SCNC: 141 MMOL/L (ref 136–145)
TRIGL SERPL-MCNC: 46 MG/DL (ref 0–150)
TSH SERPL DL<=0.005 MIU/L-ACNC: 1.25 UIU/ML (ref 0.27–4.2)
VLDLC SERPL CALC-MCNC: 10 MG/DL (ref 5–40)
WBC # BLD AUTO: 5.97 10*3/MM3 (ref 3.4–10.8)

## 2023-03-01 ENCOUNTER — OFFICE VISIT (OUTPATIENT)
Dept: INTERNAL MEDICINE | Facility: CLINIC | Age: 78
End: 2023-03-01
Payer: MEDICARE

## 2023-03-01 VITALS
SYSTOLIC BLOOD PRESSURE: 158 MMHG | HEIGHT: 63 IN | BODY MASS INDEX: 18.96 KG/M2 | OXYGEN SATURATION: 98 % | DIASTOLIC BLOOD PRESSURE: 82 MMHG | WEIGHT: 107 LBS | HEART RATE: 79 BPM

## 2023-03-01 DIAGNOSIS — I10 ESSENTIAL HYPERTENSION: Primary | ICD-10-CM

## 2023-03-01 DIAGNOSIS — N93.9 VAGINAL BLEEDING: ICD-10-CM

## 2023-03-01 DIAGNOSIS — M81.0 OSTEOPOROSIS, UNSPECIFIED OSTEOPOROSIS TYPE, UNSPECIFIED PATHOLOGICAL FRACTURE PRESENCE: ICD-10-CM

## 2023-03-01 DIAGNOSIS — Z12.4 ENCOUNTER FOR SCREENING FOR CERVICAL CANCER: ICD-10-CM

## 2023-03-01 DIAGNOSIS — E78.5 HYPERLIPIDEMIA, UNSPECIFIED HYPERLIPIDEMIA TYPE: ICD-10-CM

## 2023-03-01 PROCEDURE — 99214 OFFICE O/P EST MOD 30 MIN: CPT | Performed by: INTERNAL MEDICINE

## 2023-03-01 NOTE — PROGRESS NOTES
Subjective     Jasmine Banegas is a 77 y.o. female who presents with   Chief Complaint   Patient presents with   • Hypertension   • Hyperlipidemia   • Osteoporosis       History of Present Illness     HTN.  Up today in office but control at home is good.  HLD.  Good LDL control.  OP.  She is maintained on Prolia.      C/o vaginal bleeding one episode two months ago.  One occurrence.  No further episodes in two months.      The following data was reviewed by: Chanel Duncan MD on 03/01/2023:  Common labs    Common Labs 8/17/22 8/17/22 8/17/22 2/22/23 2/22/23 2/22/23    0919 0919 0919 0953 [id number] 0953   Glucose  80   86    BUN  15   17    Creatinine  0.70   0.72    Sodium  141   141    Potassium  4.2   5.2    Chloride  101   105    Calcium  9.2   9.5    Total Protein  6.4   6.6    Albumin  4.2   4.4    Total Bilirubin  0.6   0.5    Alkaline Phosphatase  53   49    AST (SGOT)  25   26    ALT (SGPT)  18   16    WBC 5.8   5.97     Hemoglobin 12.6   13.1     Hematocrit 38.5   39.2     Platelets 163   159     Total Cholesterol   138   151   Triglycerides   43   46   HDL Cholesterol   52   67 (A)   LDL Cholesterol    76   74   (A) Abnormal value       Comments are available for some flowsheets but are not being displayed.         Review of Systems   Respiratory: Negative.    Cardiovascular: Negative.        The following portions of the patient's history were reviewed and updated as appropriate: allergies, current medications and problem list.    Patient Active Problem List    Diagnosis Date Noted   • History of diverticulitis 03/08/2022   • Graves' eye disease 07/14/2020   • Osteoarthritis of knee 03/19/2018   • Glaucoma of both eyes 03/14/2017   • Overactive bladder 03/07/2016   • HLD (hyperlipidemia) 02/25/2016     Note Last Updated: 2/25/2016     Description: Statins started in 2/2014 secondary to 7.6% 10 year ASCVD risk.     • Adult hypothyroidism 02/25/2016   • OP (osteoporosis) 02/25/2016     Note Last Updated:  "2/25/2016     Description: Fosamax [id number]  Prolia started in 2014. Recommend 1200mg calcium and 1000IUs D in diet/supplements.     • Essential hypertension 02/25/2016     Note Last Updated: 2/25/2016     Impression: 02/12/2014 - Blood pressure is well-controlled when not in the office.  I do encourage to check regularly.;          Current Outpatient Medications on File Prior to Visit   Medication Sig Dispense Refill   • Calcium-Magnesium-Vitamin D - MG-MG-UNIT tablet sustained-release 24 hour Take 1 tablet by mouth Every Morning.     • Coenzyme Q10 (CO Q 10 PO) Take 200 mg by mouth Daily.     • fluticasone (FLONASE) 50 MCG/ACT nasal spray 2 sprays into the nostril(s) as directed by provider Daily. 16 g 5   • latanoprost (XALATAN) 0.005 % ophthalmic solution Administer 1 drop into the left eye Every Night.     • levothyroxine (SYNTHROID, LEVOTHROID) 75 MCG tablet Take 1 tablet by mouth once daily 90 tablet 0   • lisinopril (PRINIVIL,ZESTRIL) 10 MG tablet Take 1 tablet by mouth once daily 90 tablet 0   • montelukast (SINGULAIR) 10 MG tablet Take 1 tablet by mouth once daily 90 tablet 0   • Multiple Vitamins-Minerals (ZINC PO) Take 50 mg by mouth Daily.     • multivitamin with minerals tablet tablet Take 1 tablet by mouth Daily.     • Omega-3 Fatty Acids (OMEGA 3 PO) Take 1,280 mg by mouth Daily.     • simvastatin (ZOCOR) 20 MG tablet TAKE 1 TABLET BY MOUTH ONCE DAILY AT BEDTIME 90 tablet 0   • timolol (TIMOPTIC) 0.5 % ophthalmic solution INSTILL 1 DROP INTO EACH EYE TWICE DAILY       No current facility-administered medications on file prior to visit.       Objective     /82   Pulse 79   Ht 158.8 cm (62.52\")   Wt 48.5 kg (107 lb)   SpO2 98%   BMI 19.25 kg/m²     Physical Exam  Constitutional:       Appearance: She is well-developed.   HENT:      Head: Normocephalic and atraumatic.   Pulmonary:      Effort: Pulmonary effort is normal.   Genitourinary:     Labia:         Right: No lesion.       "   Left: No lesion.       Vagina: Normal.      Cervix: No cervical motion tenderness, discharge or friability.      Adnexa:         Right: No mass or tenderness.          Left: No mass or tenderness.     Neurological:      Mental Status: She is alert and oriented to person, place, and time.   Psychiatric:         Behavior: Behavior normal.         Assessment & Plan   Diagnoses and all orders for this visit:    1. Essential hypertension (Primary)    2. Hyperlipidemia, unspecified hyperlipidemia type    3. Osteoporosis, unspecified osteoporosis type, unspecified pathological fracture presence  -     Ambulatory Referral to [facility] For Infusion Treatment    4. Vaginal bleeding  -     US Pelvis Complete; Future    5. Encounter for screening for cervical cancer  -     IGP, Rfx Aptima HPV ASCU        Discussion    HTN.  Increased today in office. The patient is instructed to monitor at home and call in checks.    HLD.  Control is good.  The patient is advised to continue current dosage of atorvastatin.    OP.  I recommend to get 1200 mg of calcium and 1000 IUs of vitamin D through diet and supplements and to participate in a weight based exercise to prevent loss of bone mineral density. Bone mineral will be monitored every two years.    Continue Prolia.    Vaginal bleeding one episode two months ago.  Pap smear done today.  Pelvic US ordered to further evaluate.           Future Appointments   Date Time Provider Department Center   9/26/2023  9:30 AM [facility]   10/3/2023 11:15 AM Chanel Duncan MD [facility]

## 2023-03-09 ENCOUNTER — TELEPHONE (OUTPATIENT)
Dept: INTERNAL MEDICINE | Facility: CLINIC | Age: 78
End: 2023-03-09
Payer: MEDICARE

## 2023-03-09 LAB
CONV .: NORMAL
CYTOLOGIST CVX/VAG CYTO: NORMAL
CYTOLOGY CVX/VAG DOC CYTO: NORMAL
CYTOLOGY CVX/VAG DOC THIN PREP: NORMAL
DX ICD CODE: NORMAL
HIV 1 & 2 AB SER-IMP: NORMAL
Lab: NORMAL
OTHER STN SPEC: NORMAL
STAT OF ADQ CVX/VAG CYTO-IMP: NORMAL

## 2023-03-09 NOTE — TELEPHONE ENCOUNTER
----- Message from Chanel Duncan MD sent at 3/9/2023 12:01 PM EST -----  Call and advise.  Pap smear is normal.

## 2023-03-13 ENCOUNTER — DOCUMENTATION (OUTPATIENT)
Dept: ONCOLOGY | Facility: HOSPITAL | Age: 78
End: 2023-03-13
Payer: MEDICARE

## 2023-03-15 ENCOUNTER — INFUSION (OUTPATIENT)
Dept: ONCOLOGY | Facility: HOSPITAL | Age: 78
End: 2023-03-15
Payer: MEDICARE

## 2023-03-15 VITALS — RESPIRATION RATE: 18 BRPM | BODY MASS INDEX: 19.57 KG/M2 | HEIGHT: 62 IN | TEMPERATURE: 97.2 F

## 2023-03-15 DIAGNOSIS — M81.0 OSTEOPOROSIS, UNSPECIFIED OSTEOPOROSIS TYPE, UNSPECIFIED PATHOLOGICAL FRACTURE PRESENCE: Primary | ICD-10-CM

## 2023-03-15 PROCEDURE — 25010000002 DENOSUMAB 60 MG/ML SOLUTION PREFILLED SYRINGE: Performed by: INTERNAL MEDICINE

## 2023-03-15 PROCEDURE — 96372 THER/PROPH/DIAG INJ SC/IM: CPT

## 2023-03-15 RX ADMIN — DENOSUMAB 60 MG: 60 INJECTION SUBCUTANEOUS at 11:03

## 2023-04-19 ENCOUNTER — HOSPITAL ENCOUNTER (OUTPATIENT)
Dept: ULTRASOUND IMAGING | Facility: HOSPITAL | Age: 78
Discharge: HOME | End: 2023-04-19
Admitting: INTERNAL MEDICINE
Payer: MEDICARE

## 2023-04-19 DIAGNOSIS — N93.9 VAGINAL BLEEDING: ICD-10-CM

## 2023-04-19 PROCEDURE — 76830 TRANSVAGINAL US NON-OB: CPT

## 2023-04-19 PROCEDURE — 76856 US EXAM PELVIC COMPLETE: CPT

## 2023-05-08 RX ORDER — LEVOTHYROXINE SODIUM 0.07 MG/1
TABLET ORAL
Qty: 90 TABLET | Refills: 0 | Status: SHIPPED | OUTPATIENT
Start: 2023-05-08

## 2023-05-08 RX ORDER — MONTELUKAST SODIUM 10 MG/1
TABLET ORAL
Qty: 90 TABLET | Refills: 0 | Status: SHIPPED | OUTPATIENT
Start: 2023-05-08

## 2023-05-08 RX ORDER — LISINOPRIL 10 MG/1
TABLET ORAL
Qty: 90 TABLET | Refills: 0 | Status: SHIPPED | OUTPATIENT
Start: 2023-05-08

## 2023-05-08 RX ORDER — SIMVASTATIN 20 MG
TABLET ORAL
Qty: 90 TABLET | Refills: 0 | Status: SHIPPED | OUTPATIENT
Start: 2023-05-08

## 2023-08-04 RX ORDER — LISINOPRIL 10 MG/1
TABLET ORAL
Qty: 90 TABLET | Refills: 0 | Status: SHIPPED | OUTPATIENT
Start: 2023-08-04

## 2023-08-04 RX ORDER — SIMVASTATIN 20 MG
TABLET ORAL
Qty: 90 TABLET | Refills: 0 | Status: SHIPPED | OUTPATIENT
Start: 2023-08-04

## 2023-08-04 RX ORDER — MONTELUKAST SODIUM 10 MG/1
TABLET ORAL
Qty: 90 TABLET | Refills: 0 | Status: SHIPPED | OUTPATIENT
Start: 2023-08-04

## 2023-08-04 RX ORDER — LEVOTHYROXINE SODIUM 0.07 MG/1
TABLET ORAL
Qty: 90 TABLET | Refills: 0 | Status: SHIPPED | OUTPATIENT
Start: 2023-08-04

## 2023-08-08 ENCOUNTER — OFFICE VISIT (OUTPATIENT)
Dept: INTERNAL MEDICINE | Facility: CLINIC | Age: 78
End: 2023-08-08
Payer: MEDICARE

## 2023-08-08 VITALS
SYSTOLIC BLOOD PRESSURE: 154 MMHG | HEIGHT: 63 IN | HEART RATE: 72 BPM | WEIGHT: 106 LBS | BODY MASS INDEX: 18.78 KG/M2 | DIASTOLIC BLOOD PRESSURE: 80 MMHG | OXYGEN SATURATION: 98 %

## 2023-08-08 DIAGNOSIS — M25.512 BILATERAL SHOULDER PAIN, UNSPECIFIED CHRONICITY: ICD-10-CM

## 2023-08-08 DIAGNOSIS — M25.60 STIFFNESS IN JOINT: ICD-10-CM

## 2023-08-08 DIAGNOSIS — R05.9 COUGH, UNSPECIFIED TYPE: ICD-10-CM

## 2023-08-08 DIAGNOSIS — M25.50 ARTHRALGIA, UNSPECIFIED JOINT: Primary | ICD-10-CM

## 2023-08-08 DIAGNOSIS — R00.2 HEART PALPITATIONS: ICD-10-CM

## 2023-08-08 DIAGNOSIS — M25.511 BILATERAL SHOULDER PAIN, UNSPECIFIED CHRONICITY: ICD-10-CM

## 2023-08-08 DIAGNOSIS — R07.9 CHEST PAIN, UNSPECIFIED TYPE: ICD-10-CM

## 2023-08-08 NOTE — PROGRESS NOTES
Subjective     Jasmine Banegas is a 78 y.o. female who presents with   Chief Complaint   Patient presents with    Shoulder Pain    Arm Pain    Rapid Heart Rate    Fatigue       Arm Pain   Associated symptoms include chest pain.   Fatigue  Associated symptoms include arthralgias, chest pain, congestion, coughing, fatigue and weakness. Pertinent negatives include no chills, diaphoresis, fever or headaches.      Feeling poorly since April.  She is having pain in her bilateral arms from the shoulders to the elbows.  She is very sore in the morning.  She is generally weak.  Hand stiffness is associated.  1-2 hours to work the stiffness out.  Hands are swollen.      Heart palpitations.  Sporadic.  Chest discomfort when this happens.      Review of Systems   Constitutional:  Positive for fatigue. Negative for chills, diaphoresis and fever.   HENT:  Positive for congestion.    Eyes:  Negative for visual disturbance.   Respiratory:  Positive for cough. Negative for shortness of breath.    Cardiovascular:  Positive for chest pain and palpitations.   Musculoskeletal:  Positive for arthralgias.   Neurological:  Positive for weakness. Negative for headaches.     The following portions of the patient's history were reviewed and updated as appropriate: allergies, current medications and problem list.    Patient Active Problem List    Diagnosis Date Noted    History of diverticulitis 03/08/2022    Graves' eye disease 07/14/2020    Osteoarthritis of knee 03/19/2018    Glaucoma of both eyes 03/14/2017    Overactive bladder 03/07/2016    HLD (hyperlipidemia) 02/25/2016     Note Last Updated: 2/25/2016     Description: Statins started in 2/2014 secondary to 7.6% 10 year ASCVD risk.      Adult hypothyroidism 02/25/2016    OP (osteoporosis) 02/25/2016     Note Last Updated: 2/25/2016     Description: Fosamax [id number]  Prolia started in 2014. Recommend 1200mg calcium and 1000IUs D in diet/supplements.      Essential hypertension  "02/25/2016     Note Last Updated: 2/25/2016     Impression: 02/12/2014 - Blood pressure is well-controlled when not in the office.  I do encourage to check regularly.;          Current Outpatient Medications on File Prior to Visit   Medication Sig Dispense Refill    Calcium-Magnesium-Vitamin D - MG-MG-UNIT tablet sustained-release 24 hour Take 1 tablet by mouth Every Morning.      Coenzyme Q10 (CO Q 10 PO) Take 200 mg by mouth Daily.      fluticasone (FLONASE) 50 MCG/ACT nasal spray 2 sprays into the nostril(s) as directed by provider Daily. 16 g 5    latanoprost (XALATAN) 0.005 % ophthalmic solution Administer 1 drop into the left eye Every Night.      levothyroxine (SYNTHROID, LEVOTHROID) 75 MCG tablet Take 1 tablet by mouth once daily 90 tablet 0    lisinopril (PRINIVIL,ZESTRIL) 10 MG tablet Take 1 tablet by mouth once daily 90 tablet 0    montelukast (SINGULAIR) 10 MG tablet Take 1 tablet by mouth once daily 90 tablet 0    Multiple Vitamins-Minerals (ZINC PO) Take 50 mg by mouth Daily.      multivitamin with minerals tablet tablet Take 1 tablet by mouth Daily.      Omega-3 Fatty Acids (OMEGA 3 PO) Take 1,280 mg by mouth Daily.      simvastatin (ZOCOR) 20 MG tablet TAKE 1 TABLET BY MOUTH ONCE DAILY AT BEDTIME 90 tablet 0    timolol (TIMOPTIC) 0.5 % ophthalmic solution INSTILL 1 DROP INTO EACH EYE TWICE DAILY       No current facility-administered medications on file prior to visit.       Objective     /80   Pulse 72   Ht 158.8 cm (62.52\")   Wt 48.1 kg (106 lb)   SpO2 98%   BMI 19.07 kg/mý     Physical Exam  Constitutional:       Appearance: She is well-developed.   HENT:      Head: Normocephalic and atraumatic.      Right Ear: Hearing and tympanic membrane normal.      Left Ear: Hearing and tympanic membrane normal.      Mouth/Throat:      Pharynx: No oropharyngeal exudate or posterior oropharyngeal erythema.   Cardiovascular:      Rate and Rhythm: Normal rate and regular rhythm.      Heart " sounds: Normal heart sounds.   Pulmonary:      Effort: Pulmonary effort is normal.      Breath sounds: Normal breath sounds.   Skin:     General: Skin is warm and dry.   Neurological:      Mental Status: She is alert and oriented to person, place, and time.   Psychiatric:         Behavior: Behavior normal.         ECG 12 Lead    Date/Time: 8/8/2023 5:28 PM  Performed by: Chanel Duncan MD  Authorized by: Chanel Duncan MD   Comparison: compared with previous ECG from 3/10/2021  Similar to previous ECG  Rhythm: sinus rhythm  Rate: normal  Conduction: conduction normal  ST Segments: ST segments normal  T Waves: T waves normal  QRS axis: normal    Clinical impression: normal ECG    X-rays of the chest and shoulders  performed today for following indication:   pain.  X-ray reveal nad.  There is no available x-ray for comparison.  X-ray sent to radiology for official interpretation and findings.          Assessment & Plan   Diagnoses and all orders for this visit:    1. Arthralgia, unspecified joint (Primary)  -     CBC & Differential  -     Comprehensive Metabolic Panel  -     TSH Rfx On Abnormal To Free T4  -     NISHA With / DsDNA, RNP, Sjogrens A / B, Smith  -     Cyclic Citrul Peptide Antibody, IgG / IgA  -     Rheumatoid Factor  -     Sedimentation Rate    2. Stiffness in joint  -     CBC & Differential  -     Comprehensive Metabolic Panel  -     TSH Rfx On Abnormal To Free T4  -     NISHA With / DsDNA, RNP, Sjogrens A / B, Smith  -     Cyclic Citrul Peptide Antibody, IgG / IgA  -     Rheumatoid Factor  -     Sedimentation Rate    3. Bilateral shoulder pain, unspecified chronicity  -     XR Chest PA & Lateral  -     XR Shoulder 2+ View Bilateral (In Office)    4. Cough, unspecified type  -     XR Chest PA & Lateral  -     XR Shoulder 2+ View Bilateral (In Office)    5. Chest pain, unspecified type  -     Adult Stress Echo W/ Cont or Stress Agent if Necessary Per Protocol; Future  -     Holter Monitor - 48 Hour;  Future  -     ECG 12 Lead    6. Heart palpitations  -     Adult Stress Echo W/ Cont or Stress Agent if Necessary Per Protocol; Future  -     Holter Monitor - 48 Hour; Future  -     ECG 12 Lead        Discussion    Patient presents in general feeling poorly for a couple months with pain and stiffness localized to the proximal upper extremities and hands.  Heart palpitations with chest discomfort and occasional cough also has developed.  X-rays look unremarkable today.  Check labs to start work up with concern for ruling out PMR.  Cardiac workup with stress echo and Holter monitor.  Further plans pending results.         Future Appointments   Date Time Provider Department Center   9/26/2023  9:30 AM [facility]   10/3/2023 11:15 AM Chanel Duncan MD [facility]   10/4/2023  1:30 PM REFERRED INJECTION CHAIR [facility]

## 2023-08-09 DIAGNOSIS — R91.1 LUNG NODULE: Primary | ICD-10-CM

## 2023-08-09 LAB
ALBUMIN SERPL-MCNC: 4.2 G/DL (ref 3.5–5.2)
ALBUMIN/GLOB SERPL: 1.8 G/DL
ALP SERPL-CCNC: 56 U/L (ref 39–117)
ALT SERPL-CCNC: 17 U/L (ref 1–33)
ANA SER QL: NEGATIVE
AST SERPL-CCNC: 23 U/L (ref 1–32)
BASOPHILS # BLD AUTO: 0.06 10*3/MM3 (ref 0–0.2)
BASOPHILS NFR BLD AUTO: 0.8 % (ref 0–1.5)
BILIRUB SERPL-MCNC: 0.5 MG/DL (ref 0–1.2)
BUN SERPL-MCNC: 16 MG/DL (ref 8–23)
BUN/CREAT SERPL: 24.6 (ref 7–25)
CALCIUM SERPL-MCNC: 9.5 MG/DL (ref 8.6–10.5)
CCP IGA+IGG SERPL IA-ACNC: 3 UNITS (ref 0–19)
CHLORIDE SERPL-SCNC: 98 MMOL/L (ref 98–107)
CO2 SERPL-SCNC: 26.4 MMOL/L (ref 22–29)
CREAT SERPL-MCNC: 0.65 MG/DL (ref 0.57–1)
EGFRCR SERPLBLD CKD-EPI 2021: 90.8 ML/MIN/1.73
EOSINOPHIL # BLD AUTO: 0.11 10*3/MM3 (ref 0–0.4)
EOSINOPHIL NFR BLD AUTO: 1.5 % (ref 0.3–6.2)
ERYTHROCYTE [DISTWIDTH] IN BLOOD BY AUTOMATED COUNT: 13.3 % (ref 12.3–15.4)
ERYTHROCYTE [SEDIMENTATION RATE] IN BLOOD BY WESTERGREN METHOD: 26 MM/HR (ref 0–30)
GLOBULIN SER CALC-MCNC: 2.4 GM/DL
GLUCOSE SERPL-MCNC: 87 MG/DL (ref 65–99)
HCT VFR BLD AUTO: 38.4 % (ref 34–46.6)
HGB BLD-MCNC: 12.6 G/DL (ref 12–15.9)
IMM GRANULOCYTES # BLD AUTO: 0.02 10*3/MM3 (ref 0–0.05)
IMM GRANULOCYTES NFR BLD AUTO: 0.3 % (ref 0–0.5)
LYMPHOCYTES # BLD AUTO: 1.52 10*3/MM3 (ref 0.7–3.1)
LYMPHOCYTES NFR BLD AUTO: 21.1 % (ref 19.6–45.3)
MCH RBC QN AUTO: 29.4 PG (ref 26.6–33)
MCHC RBC AUTO-ENTMCNC: 32.8 G/DL (ref 31.5–35.7)
MCV RBC AUTO: 89.5 FL (ref 79–97)
MONOCYTES # BLD AUTO: 0.71 10*3/MM3 (ref 0.1–0.9)
MONOCYTES NFR BLD AUTO: 9.9 % (ref 5–12)
NEUTROPHILS # BLD AUTO: 4.77 10*3/MM3 (ref 1.7–7)
NEUTROPHILS NFR BLD AUTO: 66.4 % (ref 42.7–76)
NRBC BLD AUTO-RTO: 0 /100 WBC (ref 0–0.2)
PLATELET # BLD AUTO: 206 10*3/MM3 (ref 140–450)
POTASSIUM SERPL-SCNC: 4.4 MMOL/L (ref 3.5–5.2)
PROT SERPL-MCNC: 6.6 G/DL (ref 6–8.5)
RBC # BLD AUTO: 4.29 10*6/MM3 (ref 3.77–5.28)
RHEUMATOID FACT SERPL-ACNC: <10 IU/ML
SODIUM SERPL-SCNC: 136 MMOL/L (ref 136–145)
TSH SERPL DL<=0.005 MIU/L-ACNC: 2.33 UIU/ML (ref 0.27–4.2)
WBC # BLD AUTO: 7.19 10*3/MM3 (ref 3.4–10.8)

## 2023-08-22 ENCOUNTER — HOSPITAL ENCOUNTER (OUTPATIENT)
Dept: CARDIOLOGY | Facility: HOSPITAL | Age: 78
Discharge: HOME | End: 2023-08-22
Admitting: INTERNAL MEDICINE
Payer: MEDICARE

## 2023-08-22 VITALS
HEIGHT: 62 IN | BODY MASS INDEX: 19.47 KG/M2 | SYSTOLIC BLOOD PRESSURE: 150 MMHG | HEART RATE: 64 BPM | DIASTOLIC BLOOD PRESSURE: 68 MMHG | WEIGHT: 105.82 LBS

## 2023-08-22 DIAGNOSIS — R00.2 HEART PALPITATIONS: ICD-10-CM

## 2023-08-22 DIAGNOSIS — R07.9 CHEST PAIN, UNSPECIFIED TYPE: ICD-10-CM

## 2023-08-22 LAB
AORTIC DIMENSIONLESS INDEX: 0.8 (DI)
ASCENDING AORTA: 2.8 CM
BH CV ECHO MEAS - ACS: 2.06 CM
BH CV ECHO MEAS - AI P1/2T: 439 MSEC
BH CV ECHO MEAS - AO MAX PG: 9.9 MMHG
BH CV ECHO MEAS - AO MEAN PG: 4.7 MMHG
BH CV ECHO MEAS - AO ROOT DIAM: 3.5 CM
BH CV ECHO MEAS - AO V2 MAX: 157.1 CM/SEC
BH CV ECHO MEAS - AO V2 VTI: 33.7 CM
BH CV ECHO MEAS - AVA(I,D): 1.97 CM2
BH CV ECHO MEAS - CONTRAST EF (2CH): 67 CM2
BH CV ECHO MEAS - CONTRAST EF 4CH: 67 CM2
BH CV ECHO MEAS - EDV(CUBED): 36 ML
BH CV ECHO MEAS - EDV(MOD-SP2): 92 ML
BH CV ECHO MEAS - EDV(MOD-SP4): 78 ML
BH CV ECHO MEAS - EF(MOD-BP): 67 %
BH CV ECHO MEAS - ESV(CUBED): 9.7 ML
BH CV ECHO MEAS - ESV(MOD-SP2): 27 ML
BH CV ECHO MEAS - ESV(MOD-SP4): 18 ML
BH CV ECHO MEAS - FS: 35.5 %
BH CV ECHO MEAS - IVS/LVPW: 1.35 CM
BH CV ECHO MEAS - IVSD: 1.63 CM
BH CV ECHO MEAS - LV DIASTOLIC VOL/BSA (35-75): 53.4 CM2
BH CV ECHO MEAS - LV MASS(C)D: 163.3 GRAMS
BH CV ECHO MEAS - LV MAX PG: 8.1 MMHG
BH CV ECHO MEAS - LV MEAN PG: 3.1 MMHG
BH CV ECHO MEAS - LV SYSTOLIC VOL/BSA (12-30): 12.3 CM2
BH CV ECHO MEAS - LV V1 MAX: 142.2 CM/SEC
BH CV ECHO MEAS - LV V1 VTI: 27.1 CM
BH CV ECHO MEAS - LVIDD: 3.3 CM
BH CV ECHO MEAS - LVIDS: 2.13 CM
BH CV ECHO MEAS - LVOT AREA: 2.45 CM2
BH CV ECHO MEAS - LVOT DIAM: 1.77 CM
BH CV ECHO MEAS - LVPWD: 1.21 CM
BH CV ECHO MEAS - MR MAX PG: 105.8 MMHG
BH CV ECHO MEAS - MR MAX VEL: 514.4 CM/SEC
BH CV ECHO MEAS - MV A DUR: 0.13 SEC
BH CV ECHO MEAS - MV A MAX VEL: 95.7 CM/SEC
BH CV ECHO MEAS - MV DEC TIME: 0.29 MSEC
BH CV ECHO MEAS - MV E MAX VEL: 41.6 CM/SEC
BH CV ECHO MEAS - MV E/A: 0.43
BH CV ECHO MEAS - PA ACC TIME: 0.17 SEC
BH CV ECHO MEAS - PA V2 MAX: 62.4 CM/SEC
BH CV ECHO MEAS - PI END-D VEL: 93.7 CM/SEC
BH CV ECHO MEAS - PULM A REVS DUR: 0.14 SEC
BH CV ECHO MEAS - PULM A REVS VEL: 39.4 CM/SEC
BH CV ECHO MEAS - PULM DIAS VEL: 43.2 CM/SEC
BH CV ECHO MEAS - PULM S/D: 1.11
BH CV ECHO MEAS - PULM SYS VEL: 48.1 CM/SEC
BH CV ECHO MEAS - QP/QS: 0.52
BH CV ECHO MEAS - RAP SYSTOLE: 8 MMHG
BH CV ECHO MEAS - RV MAX PG: 1.59 MMHG
BH CV ECHO MEAS - RV V1 MAX: 63 CM/SEC
BH CV ECHO MEAS - RV V1 VTI: 13.9 CM
BH CV ECHO MEAS - RVOT DIAM: 1.77 CM
BH CV ECHO MEAS - RVSP: 32 MMHG
BH CV ECHO MEAS - SI(MOD-SP2): 44.5 ML/M2
BH CV ECHO MEAS - SI(MOD-SP4): 41 ML/M2
BH CV ECHO MEAS - SV(LVOT): 66.4 ML
BH CV ECHO MEAS - SV(MOD-SP2): 65 ML
BH CV ECHO MEAS - SV(MOD-SP4): 60 ML
BH CV ECHO MEAS - SV(RVOT): 34.2 ML
BH CV ECHO MEAS - TAPSE (>1.6): 3.1 CM
BH CV ECHO MEAS - TR MAX PG: 24.5 MMHG
BH CV ECHO MEAS - TR MAX VEL: 247.7 CM/SEC
BH CV STRESS BP STAGE 1: NORMAL
BH CV STRESS BP STAGE 2: NORMAL
BH CV STRESS DURATION MIN STAGE 1: 3
BH CV STRESS DURATION MIN STAGE 2: 2
BH CV STRESS DURATION SEC STAGE 1: 0
BH CV STRESS DURATION SEC STAGE 2: 0
BH CV STRESS ECHO POST STRESS EJECTION FRACTION EF: 74 %
BH CV STRESS GRADE STAGE 1: 10
BH CV STRESS GRADE STAGE 2: 12
BH CV STRESS HR STAGE 1: 125
BH CV STRESS HR STAGE 2: 132
BH CV STRESS METS STAGE 1: 5
BH CV STRESS METS STAGE 2: 7
BH CV STRESS PROTOCOL 1: NORMAL
BH CV STRESS SPEED STAGE 1: 1.7
BH CV STRESS SPEED STAGE 2: 2.5
BH CV STRESS STAGE 1: 1
BH CV STRESS STAGE 2: 2
BH CV XLRA - RV BASE: 3.2 CM
BH CV XLRA - RV LENGTH: 4.9 CM
BH CV XLRA - RV MID: 2.35 CM
LEFT ATRIUM VOLUME INDEX: 22.9 ML/M2
MAXIMAL PREDICTED HEART RATE: 143 BPM
PERCENT MAX PREDICTED HR: 92.31 %
SINUS: 3.6 CM
STJ: 2.34 CM
STRESS BASELINE BP: NORMAL MMHG
STRESS BASELINE HR: 78 BPM
STRESS PERCENT HR: 109 %
STRESS POST ESTIMATED WORKLOAD: 7 METS
STRESS POST EXERCISE DUR MIN: 5 MIN
STRESS POST EXERCISE DUR SEC: 0 SEC
STRESS POST PEAK BP: NORMAL MMHG
STRESS POST PEAK HR: 132 BPM
STRESS TARGET HR: 122 BPM

## 2023-08-22 PROCEDURE — 93350 STRESS TTE ONLY: CPT

## 2023-08-22 PROCEDURE — 93356 MYOCRD STRAIN IMG SPCKL TRCK: CPT

## 2023-08-22 PROCEDURE — 93018 CV STRESS TEST I&R ONLY: CPT | Performed by: INTERNAL MEDICINE

## 2023-08-22 PROCEDURE — 93325 DOPPLER ECHO COLOR FLOW MAPG: CPT

## 2023-08-22 PROCEDURE — 93320 DOPPLER ECHO COMPLETE: CPT | Performed by: INTERNAL MEDICINE

## 2023-08-22 PROCEDURE — 93356 MYOCRD STRAIN IMG SPCKL TRCK: CPT | Performed by: INTERNAL MEDICINE

## 2023-08-22 PROCEDURE — 93320 DOPPLER ECHO COMPLETE: CPT

## 2023-08-22 PROCEDURE — 93325 DOPPLER ECHO COLOR FLOW MAPG: CPT | Performed by: INTERNAL MEDICINE

## 2023-08-22 PROCEDURE — 93017 CV STRESS TEST TRACING ONLY: CPT

## 2023-08-22 PROCEDURE — 93016 CV STRESS TEST SUPVJ ONLY: CPT | Performed by: INTERNAL MEDICINE

## 2023-08-22 PROCEDURE — 25510000001 PERFLUTREN (DEFINITY) 8.476 MG IN SODIUM CHLORIDE (PF) 0.9 % 10 ML INJECTION: Performed by: INTERNAL MEDICINE

## 2023-08-22 PROCEDURE — 93350 STRESS TTE ONLY: CPT | Performed by: INTERNAL MEDICINE

## 2023-08-22 RX ADMIN — PERFLUTREN 3 ML: 6.52 INJECTION, SUSPENSION INTRAVENOUS at 13:06

## 2023-08-29 ENCOUNTER — TELEPHONE (OUTPATIENT)
Dept: INTERNAL MEDICINE | Facility: CLINIC | Age: 78
End: 2023-08-29
Payer: MEDICARE

## 2023-08-29 NOTE — TELEPHONE ENCOUNTER
Patient called and stated that she would like to know her levels of her PMR, thyroid,  cyclic citrul peptide lab results.     Best call back # [phone]

## 2023-08-30 DIAGNOSIS — I35.1 MODERATE AORTIC REGURGITATION: Primary | ICD-10-CM

## 2023-09-05 ENCOUNTER — HOSPITAL ENCOUNTER (OUTPATIENT)
Facility: HOSPITAL | Age: 78
Discharge: HOME | End: 2023-09-05
Admitting: INTERNAL MEDICINE
Payer: MEDICARE

## 2023-09-05 DIAGNOSIS — R91.1 LUNG NODULE: ICD-10-CM

## 2023-09-05 PROCEDURE — 71250 CT THORAX DX C-: CPT

## 2023-09-11 PROBLEM — I77.810 THORACIC AORTIC ECTASIA: Status: ACTIVE | Noted: 2023-09-11

## 2023-09-20 ENCOUNTER — OFFICE VISIT (OUTPATIENT)
Dept: CARDIOLOGY | Facility: CLINIC | Age: 78
End: 2023-09-20
Payer: MEDICARE

## 2023-09-20 VITALS
SYSTOLIC BLOOD PRESSURE: 152 MMHG | DIASTOLIC BLOOD PRESSURE: 84 MMHG | WEIGHT: 109 LBS | BODY MASS INDEX: 20.06 KG/M2 | HEART RATE: 62 BPM | HEIGHT: 62 IN

## 2023-09-20 DIAGNOSIS — E78.5 HYPERLIPIDEMIA, UNSPECIFIED HYPERLIPIDEMIA TYPE: ICD-10-CM

## 2023-09-20 DIAGNOSIS — I35.1 NONRHEUMATIC AORTIC VALVE INSUFFICIENCY: ICD-10-CM

## 2023-09-20 DIAGNOSIS — I47.1 PSVT (PAROXYSMAL SUPRAVENTRICULAR TACHYCARDIA): ICD-10-CM

## 2023-09-20 DIAGNOSIS — I10 ESSENTIAL HYPERTENSION: Primary | ICD-10-CM

## 2023-09-20 DIAGNOSIS — I77.810 THORACIC AORTIC ECTASIA: ICD-10-CM

## 2023-09-20 PROBLEM — I47.10 PSVT (PAROXYSMAL SUPRAVENTRICULAR TACHYCARDIA): Status: ACTIVE | Noted: 2023-09-20

## 2023-09-20 PROCEDURE — 1160F RVW MEDS BY RX/DR IN RCRD: CPT | Performed by: INTERNAL MEDICINE

## 2023-09-20 PROCEDURE — 3079F DIAST BP 80-89 MM HG: CPT | Performed by: INTERNAL MEDICINE

## 2023-09-20 PROCEDURE — 3077F SYST BP >= 140 MM HG: CPT | Performed by: INTERNAL MEDICINE

## 2023-09-20 PROCEDURE — 99204 OFFICE O/P NEW MOD 45 MIN: CPT | Performed by: INTERNAL MEDICINE

## 2023-09-20 PROCEDURE — 1159F MED LIST DOCD IN RCRD: CPT | Performed by: INTERNAL MEDICINE

## 2023-09-20 PROCEDURE — 93000 ELECTROCARDIOGRAM COMPLETE: CPT | Performed by: INTERNAL MEDICINE

## 2023-09-20 NOTE — PROGRESS NOTES
Date of Office Visit: 2023  Encounter Provider: June Santos MD  Place of Service: [facility]  Patient Name: Jasmine Banegas  :1945      Patient ID:  Jasmine Banegas is a 78 y.o. female is here for aortic insufficiency          History of Present Illness    She has a history of hyperlipidemia, hypothyroidism.  She is here for aortic insufficiency.    Her dad  at 52 with heart disease.  Her mom had hypertension.  She is , has 2 children, one living and 1 , she is retired, she never smoked, she has 1 glass of wine on the holidays and 2 cups of coffee daily.    Labs done 2023 show normal CMP, normal TSH and normal CBC.  Lipids done 2023 showed total cholesterol 151, HDL 67, LDL 74, VLDL.  Stress echocardiogram done 2023 was normal showing no evidence of ischemia, ejection fraction 67% at rest and 74% with exercise, mild mitral insufficiency, aortic valve sclerosis without stenosis and mild to moderate aortic insufficiency.  48-hour Holter monitor done 23 showed sinus rhythm with intermittent SVT noted, longest was 179 beats at 194 bpm.  No atrial fibrillation noted.  CT chest without contrast done 2023 showed no suspicious pulmonary nodules or opacities, slight amount of coronary artery calcium in the circumflex vessel but otherwise normal coronary arteries.    She has no chest pain or pressure.  She walks twice a a day with her dog 15 to 20 minutes and has no chest pain, difficulty breathing.  She has had intermittent tachycardia which can occur once daily or twice a week or once every other week described as heart racing lasting 2 seconds and dissipating on its own.  She has had no dizziness or syncope associated with this.  She has no orthopnea or PND.  She has felt well and her energy level is good.  She takes her medications as directed without difficulty.  She checks her blood pressure at home and it  typically runs 120/80.    Past Medical History:   Diagnosis Date    Brittle bones     Glaucoma     Graves disease     History of transfusion     Hyperlipidemia     Hypertension     Osteoporosis     Overactive bladder     PONV (postoperative nausea and vomiting)     SEVERE    Slow to wake up after anesthesia          Past Surgical History:   Procedure Laterality Date    COLONOSCOPY N/A 12/31/2008    Diverticulosis Sigmoid Colon, Repeat in 10 years - Dr. Camryn Pfeiffer    COLONOSCOPY N/A 4/11/2022    Procedure: COLONOSCOPY to cecum;  Surgeon: Agnes Carlson MD;  Location: [facility] ENDOSCOPY;  Service: General;  Laterality: N/A;  PRE - screening  POST - diverticulosis tortuous colon    KAMARA TUBE INSERTION Bilateral 07/21/2020    Procedure: WITH KAMARA TUBES;  Surgeon: Negrito Dennis MD;  Location: [facility];  Service: Ophthalmology;  Laterality: Bilateral;    DACRYOCYSTORHINOSTOMY Bilateral 07/21/2020    Procedure: RIGHT DACRYOCYSTORHINOSTOMY BILATERAL  PROBE IRRIGATION WITH KAMARA TUBES;  Surgeon: Negrito Dennis MD;  [place] [facility] [place] [facility] [place]  Service: Ophthalmology;  Laterality: Bilateral;    DACRYOCYSTORHINOSTOMY Right 03/12/2021    Procedure: MID FACE LIFT RIGHT ENCOSCOPIC DACRYOCYSTORHINOSTOMY REVISION WITH KAMARA TUBE PLACEMENT;  Surgeon: Negrito Dennis MD;  [place] [facility] [place] [facility] [place]  Service: Ophthalmology;  Laterality: Right;    ECTROPION REPAIR Right 01/17/2020    Procedure: RIGHT EXTERNAL ETHMOIDECTOMY, RIGHT LOWER LID RETRACTION REPAIR WITH EYE BANK SCLERA;  Surgeon: Negrito Dennis MD;  [place] [facility] [place] [facility] [place]  Service: Ophthalmology    EYE MUSCLE SURGERY Bilateral 11/2020    FEMUR FRACTURE SURGERY Left     HIP FRACTURE SURGERY Bilateral     pin & plates in left hip; pin & plate removed right    ORBITAL DECOMPRESSION Right 01/17/2020    Procedure: RIGHT ORBITAL DECOMPRESSION;  Surgeon: Negrito Dennis MD;  [place] [facility] [place] [facility] [place]  Service:  Ophthalmology    ORBITAL DECOMPRESSION Left 03/12/2020    Procedure: LEFT ORBITAL DECOMPRESSION, LEFT EXTERNAL ETHMOIDECTOMY, LEFT LOWER LID RETRACTION REPAIR WITH EYE BANK SCLERA;  Surgeon: Negrito Dennis MD;  Location: [facility] OR [facility];  Service: Ophthalmology;  Laterality: Left;    TOTAL THYROIDECTOMY         Current Outpatient Medications on File Prior to Visit   Medication Sig Dispense Refill    Calcium-Magnesium-Vitamin D - MG-MG-UNIT tablet sustained-release 24 hour Take 1 tablet by mouth Every Morning.      Coenzyme Q10 (CO Q 10 PO) Take 200 mg by mouth Daily.      fluticasone (FLONASE) 50 MCG/ACT nasal spray 2 sprays into the nostril(s) as directed by provider Daily. 16 g 5    latanoprost (XALATAN) 0.005 % ophthalmic solution Administer 1 drop into the left eye Every Night.      levothyroxine (SYNTHROID, LEVOTHROID) 75 MCG tablet Take 1 tablet by mouth once daily 90 tablet 0    lisinopril (PRINIVIL,ZESTRIL) 10 MG tablet Take 1 tablet by mouth once daily 90 tablet 0    MAGNESIUM PO Take  by mouth.      montelukast (SINGULAIR) 10 MG tablet Take 1 tablet by mouth once daily 90 tablet 0    Multiple Vitamins-Minerals (ZINC PO) Take 50 mg by mouth Daily.      multivitamin with minerals tablet tablet Take 1 tablet by mouth Daily.      Omega-3 Fatty Acids (OMEGA 3 PO) Take 1,280 mg by mouth Daily.      simvastatin (ZOCOR) 20 MG tablet TAKE 1 TABLET BY MOUTH ONCE DAILY AT BEDTIME 90 tablet 0    timolol (TIMOPTIC) 0.5 % ophthalmic solution INSTILL 1 DROP INTO EACH EYE TWICE DAILY       No current facility-administered medications on file prior to visit.       Social History     Socioeconomic History    Marital status:     Number of children: 2   Tobacco Use    Smoking status: Never     Passive exposure: Never    Smokeless tobacco: Never    Tobacco comments:     Caffeine: 2-3 coffee daily   Vaping Use    Vaping Use: Never used   Substance and Sexual Activity    Alcohol use: Yes     Comment:  "HOLIDAY DRINKER    Drug use: Never    Sexual activity: Defer           ROS    Procedures    ECG 12 Lead    Date/Time: 9/20/2023 10:58 AM  Performed by: June Santos MD  Authorized by: June Santos MD   Comparison: compared with previous ECG   Similar to previous ECG  Rhythm: sinus rhythm    Clinical impression: normal ECG            Objective:      Vitals:    09/20/23 1031   BP: 152/84   Pulse: 62   Weight: 49.4 kg (109 lb)   Height: 157.5 cm (62\")     Body mass index is 19.94 kg/m².    Vitals reviewed.   Constitutional:       General: Not in acute distress.     Appearance: Well-developed. Not diaphoretic.   Eyes:      General: No scleral icterus.     Conjunctiva/sclera: Conjunctivae normal.   HENT:      Head: Normocephalic and atraumatic.   Neck:      Thyroid: No thyromegaly.      Vascular: No carotid bruit or JVD.      Lymphadenopathy: No cervical adenopathy.   Pulmonary:      Effort: Pulmonary effort is normal. No respiratory distress.      Breath sounds: Normal breath sounds. No wheezing. No rhonchi. No rales.   Chest:      Chest wall: Not tender to palpatation.   Cardiovascular:      Normal rate. Regular rhythm.      Murmurs: There is no murmur.      No gallop.  No rub.   Pulses:     Intact distal pulses.      Carotid: 2+ bilaterally.     Radial: 2+ bilaterally.     Dorsalis pedis: 2+ bilaterally.     Posterior tibial: 2+ bilaterally.  Edema:     Peripheral edema absent.   Abdominal:      General: Bowel sounds are normal. There is no distension or abdominal bruit.      Palpations: Abdomen is soft. There is no abdominal mass.      Tenderness: There is no abdominal tenderness.   Musculoskeletal:         General: No deformity.      Extremities: No clubbing present.     Cervical back: Neck supple. Skin:     General: Skin is warm and dry. There is no cyanosis.      Coloration: Skin is not pale.      Findings: No rash.   Neurological:      Mental Status: Alert and oriented to person, place, and " time.      Cranial Nerves: No cranial nerve deficit.   Psychiatric:         Judgment: Judgment normal.       Lab Review:       Assessment:      Diagnosis Plan   1. Essential hypertension  ECG 12 Lead      2. Thoracic aortic ectasia        3. Hyperlipidemia, unspecified hyperlipidemia type        4. Nonrheumatic aortic valve insufficiency  ECG 12 Lead      5. PSVT (paroxysmal supraventricular tachycardia)  ECG 12 Lead        Hypertension, continue lisinopril for hypertension, well controlled at home.  When she comes in next time, she will bring her blood pressure readings and her blood pressure cuff to check it for accuracy.  Paroxysmal supraventricular tachycardia.  She does feel this but it really causes minimal symptoms and dissipates on its own.  No treatment needed at this time.  Mild to moderate aortic insufficiency.  Hyperlipidemia, on simvastatin.  Hypothyroidism, stable.      Plan:       See Laina in 1 year, no other testing needed at this time.  No medication changes.

## 2023-09-21 DIAGNOSIS — E78.5 HYPERLIPIDEMIA, UNSPECIFIED HYPERLIPIDEMIA TYPE: Primary | ICD-10-CM

## 2023-09-21 DIAGNOSIS — E03.9 ADULT HYPOTHYROIDISM: ICD-10-CM

## 2023-09-21 DIAGNOSIS — I10 ESSENTIAL HYPERTENSION: ICD-10-CM

## 2023-09-30 DIAGNOSIS — M81.0 OSTEOPOROSIS, UNSPECIFIED OSTEOPOROSIS TYPE, UNSPECIFIED PATHOLOGICAL FRACTURE PRESENCE: Primary | ICD-10-CM

## 2023-11-06 RX ORDER — LISINOPRIL 10 MG/1
TABLET ORAL
Qty: 90 TABLET | Refills: 0 | Status: SHIPPED | OUTPATIENT
Start: 2023-11-06

## 2023-11-06 RX ORDER — SIMVASTATIN 20 MG
TABLET ORAL
Qty: 90 TABLET | Refills: 0 | Status: SHIPPED | OUTPATIENT
Start: 2023-11-06

## 2023-11-06 RX ORDER — MONTELUKAST SODIUM 10 MG/1
TABLET ORAL
Qty: 90 TABLET | Refills: 0 | Status: SHIPPED | OUTPATIENT
Start: 2023-11-06

## 2023-11-06 RX ORDER — LEVOTHYROXINE SODIUM 0.07 MG/1
TABLET ORAL
Qty: 90 TABLET | Refills: 0 | Status: SHIPPED | OUTPATIENT
Start: 2023-11-06

## 2024-01-17 ENCOUNTER — TELEPHONE (OUTPATIENT)
Dept: INTERNAL MEDICINE | Facility: CLINIC | Age: 79
End: 2024-01-17

## 2024-02-01 RX ORDER — MONTELUKAST SODIUM 10 MG/1
TABLET ORAL
Qty: 90 TABLET | Refills: 0 | Status: SHIPPED | OUTPATIENT
Start: 2024-02-01

## 2024-02-01 RX ORDER — LISINOPRIL 10 MG/1
TABLET ORAL
Qty: 90 TABLET | Refills: 0 | Status: SHIPPED | OUTPATIENT
Start: 2024-02-01

## 2024-02-01 RX ORDER — LEVOTHYROXINE SODIUM 0.07 MG/1
TABLET ORAL
Qty: 90 TABLET | Refills: 0 | Status: SHIPPED | OUTPATIENT
Start: 2024-02-01

## 2024-02-01 RX ORDER — SIMVASTATIN 20 MG
TABLET ORAL
Qty: 90 TABLET | Refills: 0 | Status: SHIPPED | OUTPATIENT
Start: 2024-02-01

## 2024-04-29 RX ORDER — LEVOTHYROXINE SODIUM 0.07 MG/1
TABLET ORAL
Qty: 90 TABLET | Refills: 3 | Status: SHIPPED | OUTPATIENT
Start: 2024-04-29

## 2024-04-29 RX ORDER — MONTELUKAST SODIUM 10 MG/1
TABLET ORAL
Qty: 90 TABLET | Refills: 3 | Status: SHIPPED | OUTPATIENT
Start: 2024-04-29

## 2024-04-29 RX ORDER — LISINOPRIL 10 MG/1
TABLET ORAL
Qty: 90 TABLET | Refills: 3 | Status: SHIPPED | OUTPATIENT
Start: 2024-04-29

## 2024-04-29 RX ORDER — SIMVASTATIN 20 MG
TABLET ORAL
Qty: 90 TABLET | Refills: 3 | Status: SHIPPED | OUTPATIENT
Start: 2024-04-29

## 2024-06-21 DIAGNOSIS — M81.0 OSTEOPOROSIS, UNSPECIFIED OSTEOPOROSIS TYPE, UNSPECIFIED PATHOLOGICAL FRACTURE PRESENCE: ICD-10-CM

## 2024-06-21 DIAGNOSIS — E03.9 ADULT HYPOTHYROIDISM: ICD-10-CM

## 2024-06-21 DIAGNOSIS — E78.5 HYPERLIPIDEMIA, UNSPECIFIED HYPERLIPIDEMIA TYPE: Primary | ICD-10-CM

## 2024-06-21 DIAGNOSIS — I10 ESSENTIAL HYPERTENSION: ICD-10-CM

## 2024-06-28 LAB
25(OH)D3+25(OH)D2 SERPL-MCNC: 75.7 NG/ML (ref 30–100)
ALBUMIN SERPL-MCNC: 4.2 G/DL (ref 3.5–5.2)
ALBUMIN/GLOB SERPL: 2 G/DL
ALP SERPL-CCNC: 59 U/L (ref 39–117)
ALT SERPL-CCNC: 15 U/L (ref 1–33)
APPEARANCE UR: CLEAR
AST SERPL-CCNC: 26 U/L (ref 1–32)
BACTERIA #/AREA URNS HPF: NORMAL /HPF
BASOPHILS # BLD AUTO: 0.04 10*3/MM3 (ref 0–0.2)
BASOPHILS NFR BLD AUTO: 0.8 % (ref 0–1.5)
BILIRUB SERPL-MCNC: 0.5 MG/DL (ref 0–1.2)
BILIRUB UR QL STRIP: NEGATIVE
BUN SERPL-MCNC: 15 MG/DL (ref 8–23)
BUN/CREAT SERPL: 22.4 (ref 7–25)
CALCIUM SERPL-MCNC: 9.2 MG/DL (ref 8.6–10.5)
CASTS URNS MICRO: NORMAL
CHLORIDE SERPL-SCNC: 102 MMOL/L (ref 98–107)
CHOLEST SERPL-MCNC: 143 MG/DL (ref 0–200)
CO2 SERPL-SCNC: 26.3 MMOL/L (ref 22–29)
COLOR UR: YELLOW
CREAT SERPL-MCNC: 0.67 MG/DL (ref 0.57–1)
EGFRCR SERPLBLD CKD-EPI 2021: 90.2 ML/MIN/1.73
EOSINOPHIL # BLD AUTO: 0.1 10*3/MM3 (ref 0–0.4)
EOSINOPHIL NFR BLD AUTO: 2 % (ref 0.3–6.2)
EPI CELLS #/AREA URNS HPF: NORMAL /HPF
ERYTHROCYTE [DISTWIDTH] IN BLOOD BY AUTOMATED COUNT: 13.9 % (ref 12.3–15.4)
GLOBULIN SER CALC-MCNC: 2.1 GM/DL
GLUCOSE SERPL-MCNC: 82 MG/DL (ref 65–99)
GLUCOSE UR QL STRIP: NEGATIVE
HCT VFR BLD AUTO: 38.4 % (ref 34–46.6)
HDLC SERPL-MCNC: 61 MG/DL (ref 40–60)
HGB BLD-MCNC: 12.5 G/DL (ref 12–15.9)
HGB UR QL STRIP: NEGATIVE
IMM GRANULOCYTES # BLD AUTO: 0.01 10*3/MM3 (ref 0–0.05)
IMM GRANULOCYTES NFR BLD AUTO: 0.2 % (ref 0–0.5)
KETONES UR QL STRIP: NEGATIVE
LDLC SERPL CALC-MCNC: 73 MG/DL (ref 0–100)
LEUKOCYTE ESTERASE UR QL STRIP: NEGATIVE
LYMPHOCYTES # BLD AUTO: 1.47 10*3/MM3 (ref 0.7–3.1)
LYMPHOCYTES NFR BLD AUTO: 29.3 % (ref 19.6–45.3)
MCH RBC QN AUTO: 30 PG (ref 26.6–33)
MCHC RBC AUTO-ENTMCNC: 32.6 G/DL (ref 31.5–35.7)
MCV RBC AUTO: 92.1 FL (ref 79–97)
MONOCYTES # BLD AUTO: 0.46 10*3/MM3 (ref 0.1–0.9)
MONOCYTES NFR BLD AUTO: 9.2 % (ref 5–12)
NEUTROPHILS # BLD AUTO: 2.93 10*3/MM3 (ref 1.7–7)
NEUTROPHILS NFR BLD AUTO: 58.5 % (ref 42.7–76)
NITRITE UR QL STRIP: NEGATIVE
NRBC BLD AUTO-RTO: 0 /100 WBC (ref 0–0.2)
PH UR STRIP: 8 [PH] (ref 5–8)
PLATELET # BLD AUTO: 162 10*3/MM3 (ref 140–450)
POTASSIUM SERPL-SCNC: 4.3 MMOL/L (ref 3.5–5.2)
PROT SERPL-MCNC: 6.3 G/DL (ref 6–8.5)
PROT UR QL STRIP: NEGATIVE
RBC # BLD AUTO: 4.17 10*6/MM3 (ref 3.77–5.28)
RBC #/AREA URNS HPF: NORMAL /HPF
SODIUM SERPL-SCNC: 139 MMOL/L (ref 136–145)
SP GR UR STRIP: 1.01 (ref 1–1.03)
TRIGL SERPL-MCNC: 39 MG/DL (ref 0–150)
TSH SERPL DL<=0.005 MIU/L-ACNC: 1.47 UIU/ML (ref 0.27–4.2)
UROBILINOGEN UR STRIP-MCNC: NORMAL MG/DL
VLDLC SERPL CALC-MCNC: 9 MG/DL (ref 5–40)
WBC # BLD AUTO: 5.01 10*3/MM3 (ref 3.4–10.8)
WBC #/AREA URNS HPF: NORMAL /HPF

## 2024-07-03 ENCOUNTER — OFFICE VISIT (OUTPATIENT)
Dept: INTERNAL MEDICINE | Facility: CLINIC | Age: 79
End: 2024-07-03
Payer: MEDICARE

## 2024-07-03 VITALS
SYSTOLIC BLOOD PRESSURE: 140 MMHG | HEART RATE: 70 BPM | DIASTOLIC BLOOD PRESSURE: 70 MMHG | OXYGEN SATURATION: 96 % | WEIGHT: 101 LBS | HEIGHT: 61 IN | BODY MASS INDEX: 19.07 KG/M2

## 2024-07-03 DIAGNOSIS — Z12.31 ENCOUNTER FOR SCREENING MAMMOGRAM FOR BREAST CANCER: ICD-10-CM

## 2024-07-03 DIAGNOSIS — Z00.00 MEDICARE ANNUAL WELLNESS VISIT, SUBSEQUENT: Primary | ICD-10-CM

## 2024-07-03 DIAGNOSIS — M81.0 OSTEOPOROSIS, UNSPECIFIED OSTEOPOROSIS TYPE, UNSPECIFIED PATHOLOGICAL FRACTURE PRESENCE: ICD-10-CM

## 2024-07-03 DIAGNOSIS — Z00.00 WELL ADULT EXAM: ICD-10-CM

## 2024-07-03 RX ORDER — LEVOTHYROXINE SODIUM 0.07 MG/1
75 TABLET ORAL DAILY
Qty: 90 TABLET | Refills: 3 | Status: SHIPPED | OUTPATIENT
Start: 2024-07-03

## 2024-07-03 RX ORDER — SIMVASTATIN 20 MG
20 TABLET ORAL
Qty: 90 TABLET | Refills: 3 | Status: SHIPPED | OUTPATIENT
Start: 2024-07-03

## 2024-07-03 RX ORDER — LISINOPRIL 10 MG/1
10 TABLET ORAL DAILY
Qty: 90 TABLET | Refills: 3 | Status: SHIPPED | OUTPATIENT
Start: 2024-07-03

## 2024-07-03 RX ORDER — MONTELUKAST SODIUM 10 MG/1
10 TABLET ORAL DAILY
Qty: 90 TABLET | Refills: 3 | Status: SHIPPED | OUTPATIENT
Start: 2024-07-03

## 2024-07-03 RX ORDER — FLUTICASONE PROPIONATE 50 MCG
2 SPRAY, SUSPENSION (ML) NASAL DAILY
Qty: 16 G | Refills: 5 | Status: SHIPPED | OUTPATIENT
Start: 2024-07-03

## 2024-07-03 NOTE — PATIENT INSTRUCTIONS
Medicare Wellness  Personal Prevention Plan of Service     Date of Office Visit:    Encounter Provider:  Chanel Duncan MD  Place of Service:  [facility]  Patient Name: Jasmine Banegas  :  1945    As part of the Medicare Wellness portion of your visit today, we are providing you with this personalized preventive plan of services (PPPS). This plan is based upon recommendations of the [place] Preventive Services Task Force (USPSTF) and the Advisory Committee on Immunization Practices (ACIP).    This lists the preventive care services that should be considered, and provides dates of when you are due. Items listed as completed are up-to-date and do not require any further intervention.    Health Maintenance   Topic Date Due    ZOSTER VACCINE (1 of 2) Never done    RSV Vaccine - Adults (1 - 1-dose 60+ series) Never done    TDAP/TD VACCINES (2 - Td or Tdap) 10/01/2022    DXA SCAN  2023    ANNUAL WELLNESS VISIT  2023    COVID-19 Vaccine ( season) 2023    INFLUENZA VACCINE  2024    LIPID PANEL  2025    COLORECTAL CANCER SCREENING  2032    HEPATITIS C SCREENING  Completed    Pneumococcal Vaccine 65+  Completed       Orders Placed This Encounter   Procedures    DEXA Bone Density Axial     Standing Status:   Future     Order Specific Question:   Is patient taking or have taken long term Glucocorticoid (steroids)?     Answer:   No     Order Specific Question:   Does the patient have rheumatoid arthritis?     Answer:   No     Order Specific Question:   Reason for Exam:     Answer:   op     Order Specific Question:   Does this patient have a diabetic monitoring/medication delivering device on?     Answer:   No     Order Specific Question:   Release to patient     Answer:   Routine Release [[id number]]     Order Specific Question:   Does the patient have secondary osteoporosis?     Answer:   No    Mammo Screening Digital Tomosynthesis  Bilateral With CAD     Standing Status:   Future     Standing Expiration Date:   7/3/2025     Order Specific Question:   Reason for Exam:     Answer:   screening     Order Specific Question:   Release to patient     Answer:   Routine Release [[id number]]    Ambulatory Referral to [facility] For Infusion Treatment     Referral Priority:   Routine     Referral Type:   Infusion     Referral Reason:   Specialty Services Required     Number of Visits Requested:   1       Return in about 6 months (around 1/3/2025) for Recheck.

## 2024-07-03 NOTE — PROGRESS NOTES
The ABCs of the Annual Wellness Visit  Subsequent Medicare Wellness Visit    Subjective    Jasmine Banegas is a 78 y.o. female who presents for a Subsequent Medicare Wellness Visit.    The following portions of the patient's history were reviewed and   updated as appropriate: allergies, current medications, past family history, past medical history, past social history, past surgical history, and problem list.    Compared to one year ago, the patient feels her physical   health is the same.    Compared to one year ago, the patient feels her mental   health is the same.    Recent Hospitalizations:  She was not admitted to the hospital during the last year.       Current Medical Providers:  Patient Care Team:  Chanel Duncan MD as PCP - General  Chanel Duncan MD as PCP - Family Medicine  Negrito Dennis MD as Consulting Physician (Ophthalmology)  Boaz Montalvo MD as Consulting Physician (Pediatric Ophthalmology)    Outpatient Medications Prior to Visit   Medication Sig Dispense Refill    Calcium-Magnesium-Vitamin D - MG-MG-UNIT tablet sustained-release 24 hour Take 1 tablet by mouth Every Morning.      Coenzyme Q10 (CO Q 10 PO) Take 200 mg by mouth Daily.      latanoprost (XALATAN) 0.005 % ophthalmic solution Administer 1 drop into the left eye Every Night.      MAGNESIUM PO Take  by mouth.      Multiple Vitamins-Minerals (ZINC PO) Take 50 mg by mouth Daily.      multivitamin with minerals tablet tablet Take 1 tablet by mouth Daily.      Omega-3 Fatty Acids (OMEGA 3 PO) Take 1,280 mg by mouth Daily.      timolol (TIMOPTIC) 0.5 % ophthalmic solution INSTILL 1 DROP INTO EACH EYE TWICE DAILY      fluticasone (FLONASE) 50 MCG/ACT nasal spray 2 sprays into the nostril(s) as directed by provider Daily. 16 g 5    levothyroxine (SYNTHROID, LEVOTHROID) 75 MCG tablet Take 1 tablet by mouth once daily 90 tablet 3    lisinopril (PRINIVIL,ZESTRIL) 10 MG tablet Take 1 tablet by mouth once daily 90  "tablet 3    montelukast (SINGULAIR) 10 MG tablet Take 1 tablet by mouth once daily 90 tablet 3    simvastatin (ZOCOR) 20 MG tablet TAKE 1 TABLET BY MOUTH ONCE DAILY AT BEDTIME 90 tablet 3    benzonatate (TESSALON) 200 MG capsule Take 1 capsule by mouth 3 (Three) Times a Day As Needed for Cough. (Patient not taking: Reported on 7/3/2024) 21 capsule 0    brompheniramine-pseudoephedrine-DM 30-2-10 MG/5ML syrup Take 5 mL by mouth At Night As Needed for Congestion or Cough. (Patient not taking: Reported on 7/3/2024) 118 mL 0     No facility-administered medications prior to visit.       No opioid medication identified on active medication list. I have reviewed chart for other potential  high risk medication/s and harmful drug interactions in the elderly.        Aspirin is not on active medication list.  Aspirin use is not indicated based on review of current medical condition/s. Risk of harm outweighs potential benefits.  .    Patient Active Problem List   Diagnosis    HLD (hyperlipidemia)    Adult hypothyroidism    OP (osteoporosis)    Essential hypertension    Overactive bladder    Glaucoma of both eyes    Osteoarthritis of knee    Graves' eye disease    History of diverticulitis    Thoracic aortic ectasia    Nonrheumatic aortic valve insufficiency    PSVT (paroxysmal supraventricular tachycardia)          Objective    Vitals:    07/03/24 0958   BP: 140/70   Pulse: 70   SpO2: 96%   Weight: 45.8 kg (101 lb)   Height: 154.9 cm (61\")   PainSc: 0-No pain     Estimated body mass index is 19.08 kg/m² as calculated from the following:    Height as of this encounter: 154.9 cm (61\").    Weight as of this encounter: 45.8 kg (101 lb).    BMI is within normal parameters. No other follow-up for BMI required.      Does the patient have evidence of cognitive impairment? No    Lab Results   Component Value Date    CHLPL 143 06/28/2024    TRIG 39 06/28/2024    HDL 61 (H) 06/28/2024    LDL 73 06/28/2024    VLDL 9 06/28/2024      "   HEALTH RISK ASSESSMENT    Smoking Status:  Social History     Tobacco Use   Smoking Status Never    Passive exposure: Never   Smokeless Tobacco Never   Tobacco Comments    Caffeine: 2-3 coffee daily     Alcohol Consumption:  Social History     Substance and Sexual Activity   Alcohol Use Yes    Comment: HOLID[employer] DRINKER     Fall Risk Screen:    JAY Fall Risk Assessment was completed, and patient is at LOW risk for falls.Assessment completed on:7/3/2024    Depression Screenin/3/2024     9:56 AM   PHQ-2/PHQ-9 Depression Screening   Little Interest or Pleasure in Doing Things 0-->not at all   Feeling Down, Depressed or Hopeless 0-->not at all   PHQ-9: Brief Depression Severity Measure Score 0       Health Habits and Functional and Cognitive Screenin/3/2024     9:56 AM   Functional & Cognitive Status   Do you have difficulty preparing food and eating? No   Do you have difficulty bathing yourself, getting dressed or grooming yourself? No   Do you have difficulty using the toilet? No   Do you have difficulty moving around from place to place? No   Do you have trouble with steps or getting out of a bed or a chair? No   Current Diet Well Balanced Diet   Dental Exam Up to date   Eye Exam Up to date   Exercise (times per week) 7 times per week   Current Exercises Include Walking   Do you need help using the phone?  No   Are you deaf or do you have serious difficulty hearing?  No   Do you need help to go to places out of walking distance? No   Do you need help shopping? No   Do you need help preparing meals?  No   Do you need help with housework?  No   Do you need help with laundry? No   Do you need help taking your medications? No   Do you need help managing money? No   Do you ever drive or ride in a car without wearing a seat belt? No   Have you felt unusual stress, anger or loneliness in the last month? No   Who do you live with? Alone   If you need help, do you have trouble finding someone available to  you? No   Have you been bothered in the last four weeks by sexual problems? No   Do you have difficulty concentrating, remembering or making decisions? No       Age-appropriate Screening Schedule:  Refer to the list below for future screening recommendations based on patient's age, sex and/or medical conditions. Orders for these recommended tests are listed in the plan section. The patient has been provided with a written plan.    Health Maintenance   Topic Date Due    ZOSTER VACCINE (1 of 2) Never done    RSV Vaccine - Adults (1 - 1-dose 60+ series) Never done    TDAP/TD VACCINES (2 - Td or Tdap) 10/01/2022    DXA SCAN  05/26/2023    ANNUAL WELLNESS VISIT  08/24/2023    COVID-19 Vaccine (4 - 2023-24 season) 09/01/2023    INFLUENZA VACCINE  08/01/2024    LIPID PANEL  06/28/2025    COLORECTAL CANCER SCREENING  04/11/2032    HEPATITIS C SCREENING  Completed    Pneumococcal Vaccine 65+  Completed                  CMS Preventative Services Quick Reference  Risk Factors Identified During Encounter  Immunizations Discussed/Encouraged: Shingrix  The above risks/problems have been discussed with the patient.  Pertinent information has been shared with the patient in the After Visit Summary.  An After Visit Summary and PPPS were made available to the patient.    Follow Up:   Next Medicare Wellness visit to be scheduled in 1 year.       Additional E&M Note during same encounter follows:  Patient has multiple medical problems which are significant and separately identifiable that require additional work above and beyond the Medicare Wellness Visit.      Chief Complaint  Medicare Wellness-subsequent    Subjective        HPI  Jasmine Banegas is also being seen today for CPE    HTN.  Good control.    HLD.  Good control.  Hypothryoidism.  Fair control.   OP.  Off Prolia when insurance changed.  Needs to get back on.      Review of Systems   Respiratory: Negative.     Cardiovascular: Negative.        Objective   Vital Signs:  BP  "140/70   Pulse 70   Ht 154.9 cm (61\")   Wt 45.8 kg (101 lb)   SpO2 96%   BMI 19.08 kg/m²     Physical Exam  Constitutional:       Appearance: She is well-developed.   HENT:      Head: Normocephalic and atraumatic.      Right Ear: Hearing, tympanic membrane and external ear normal.      Left Ear: Hearing, tympanic membrane and external ear normal.      Nose: Nose normal.   Neck:      Thyroid: No thyromegaly.   Cardiovascular:      Rate and Rhythm: Normal rate and regular rhythm.      Heart sounds: Normal heart sounds. No murmur heard.  Pulmonary:      Effort: Pulmonary effort is normal.      Breath sounds: Normal breath sounds.   Abdominal:      General: There is no distension.      Palpations: Abdomen is soft.      Tenderness: There is no abdominal tenderness.   Musculoskeletal:      Cervical back: Neck supple.   Lymphadenopathy:      Cervical: No cervical adenopathy.   Skin:     General: Skin is warm and dry.   Neurological:      Mental Status: She is alert and oriented to person, place, and time.   Psychiatric:         Speech: Speech normal.         Behavior: Behavior normal.         Thought Content: Thought content normal.         Judgment: Judgment normal.          The following data was reviewed by: Chanel Duncan MD on 07/03/2024:  Common labs          8/8/2023    14:28 6/28/2024    09:14   Common Labs   Glucose 87  82    BUN 16  15    Creatinine 0.65  0.67    Sodium 136  139    Potassium 4.4  4.3    Chloride 98  102    Calcium 9.5  9.2    Total Protein 6.6  6.3    Albumin 4.2  4.2    Total Bilirubin 0.5  0.5    Alkaline Phosphatase 56  59    AST (SGOT) 23  26    ALT (SGPT) 17  15    WBC 7.19  5.01    Hemoglobin 12.6  12.5    Hematocrit 38.4  38.4    Platelets 206  162    Total Cholesterol  143    Triglycerides  39    HDL Cholesterol  61    LDL Cholesterol   73                 Assessment and Plan   Diagnoses and all orders for this visit:    1. Medicare annual wellness visit, subsequent (Primary)    2. " Well adult exam    3. Osteoporosis, unspecified osteoporosis type, unspecified pathological fracture presence  -     DEXA Bone Density Axial; Future  -     Ambulatory Referral to [facility] For Infusion Treatment    4. Encounter for screening mammogram for breast cancer  -     Mammo Screening Digital Tomosynthesis Bilateral With CAD; Future    Other orders  -     denosumab (PROLIA) syringe 60 mg  -     simvastatin (ZOCOR) 20 MG tablet; Take 1 tablet by mouth every night at bedtime.  Dispense: 90 tablet; Refill: 3  -     lisinopril (PRINIVIL,ZESTRIL) 10 MG tablet; Take 1 tablet by mouth Daily.  Dispense: 90 tablet; Refill: 3  -     levothyroxine (SYNTHROID, LEVOTHROID) 75 MCG tablet; Take 1 tablet by mouth Daily.  Dispense: 90 tablet; Refill: 3  -     fluticasone (FLONASE) 50 MCG/ACT nasal spray; 2 sprays into the nostril(s) as directed by provider Daily.  Dispense: 16 g; Refill: 5  -     montelukast (SINGULAIR) 10 MG tablet; Take 1 tablet by mouth Daily.  Dispense: 90 tablet; Refill: 3      Discussed importance of preventative care including vaccinations, age appropriate cancer screening, routine lab work, healthy diet, and active lifestyle.  OP.  I recommend to get 1200 mg of calcium and 1000 IUs of vitamin D through diet and supplements and to participate in a weight based exercise to prevent loss of bone mineral density. Bone mineral will be monitored every two years.    Prolia is restarted.        Follow Up   Return in about 6 months (around 1/3/2025) for Recheck.  Patient was given instructions and counseling regarding her condition or for health maintenance advice. Please see specific information pulled into the AVS if appropriate.

## 2024-07-23 ENCOUNTER — HOSPITAL ENCOUNTER (OUTPATIENT)
Dept: BONE DENSITY | Facility: HOSPITAL | Age: 79
Discharge: HOME | End: 2024-07-23
Admitting: INTERNAL MEDICINE
Payer: MEDICARE

## 2024-07-23 DIAGNOSIS — M81.0 OSTEOPOROSIS, UNSPECIFIED OSTEOPOROSIS TYPE, UNSPECIFIED PATHOLOGICAL FRACTURE PRESENCE: ICD-10-CM

## 2024-07-23 PROCEDURE — 77080 DXA BONE DENSITY AXIAL: CPT

## 2024-07-24 DIAGNOSIS — M81.0 OSTEOPOROSIS, UNSPECIFIED OSTEOPOROSIS TYPE, UNSPECIFIED PATHOLOGICAL FRACTURE PRESENCE: Primary | ICD-10-CM

## 2024-07-30 ENCOUNTER — INFUSION (OUTPATIENT)
Dept: ONCOLOGY | Facility: HOSPITAL | Age: 79
End: 2024-07-30
Payer: MEDICARE

## 2024-07-30 ENCOUNTER — LAB (OUTPATIENT)
Dept: OTHER | Facility: HOSPITAL | Age: 79
End: 2024-07-30
Payer: MEDICARE

## 2024-07-30 DIAGNOSIS — M81.0 OSTEOPOROSIS, UNSPECIFIED OSTEOPOROSIS TYPE, UNSPECIFIED PATHOLOGICAL FRACTURE PRESENCE: Primary | ICD-10-CM

## 2024-07-30 LAB
ANION GAP SERPL CALCULATED.3IONS-SCNC: 9.7 MMOL/L (ref 5–15)
BUN SERPL-MCNC: 19 MG/DL (ref 8–23)
BUN/CREAT SERPL: 30.2 (ref 7–25)
CALCIUM SPEC-SCNC: 9 MG/DL (ref 8.6–10.5)
CHLORIDE SERPL-SCNC: 102 MMOL/L (ref 98–107)
CO2 SERPL-SCNC: 28.3 MMOL/L (ref 22–29)
CREAT SERPL-MCNC: 0.63 MG/DL (ref 0.57–1)
EGFRCR SERPLBLD CKD-EPI 2021: 91.5 ML/MIN/1.73
GLUCOSE SERPL-MCNC: 84 MG/DL (ref 65–99)
POTASSIUM SERPL-SCNC: 5 MMOL/L (ref 3.5–5.2)
SODIUM SERPL-SCNC: 140 MMOL/L (ref 136–145)

## 2024-07-30 PROCEDURE — 25010000002 DENOSUMAB 60 MG/ML SOLUTION PREFILLED SYRINGE: Performed by: INTERNAL MEDICINE

## 2024-07-30 PROCEDURE — 80048 BASIC METABOLIC PNL TOTAL CA: CPT | Performed by: INTERNAL MEDICINE

## 2024-07-30 PROCEDURE — 96372 THER/PROPH/DIAG INJ SC/IM: CPT

## 2024-07-30 RX ADMIN — DENOSUMAB 60 MG: 60 INJECTION SUBCUTANEOUS at 14:03

## 2024-07-31 ENCOUNTER — HOSPITAL ENCOUNTER (OUTPATIENT)
Dept: MAMMOGRAPHY | Facility: HOSPITAL | Age: 79
Discharge: HOME | End: 2024-07-31
Admitting: INTERNAL MEDICINE
Payer: MEDICARE

## 2024-07-31 DIAGNOSIS — Z12.31 ENCOUNTER FOR SCREENING MAMMOGRAM FOR BREAST CANCER: ICD-10-CM

## 2024-07-31 PROCEDURE — 77063 BREAST TOMOSYNTHESIS BI: CPT

## 2024-07-31 PROCEDURE — 77067 SCR MAMMO BI INCL CAD: CPT

## 2024-09-25 ENCOUNTER — OFFICE VISIT (OUTPATIENT)
Dept: CARDIOLOGY | Facility: CLINIC | Age: 79
End: 2024-09-25
Payer: MEDICARE

## 2024-09-25 VITALS
HEART RATE: 61 BPM | SYSTOLIC BLOOD PRESSURE: 128 MMHG | WEIGHT: 104 LBS | BODY MASS INDEX: 19.63 KG/M2 | DIASTOLIC BLOOD PRESSURE: 70 MMHG | HEIGHT: 61 IN

## 2024-09-25 DIAGNOSIS — I47.10 PSVT (PAROXYSMAL SUPRAVENTRICULAR TACHYCARDIA): ICD-10-CM

## 2024-09-25 DIAGNOSIS — E78.5 HYPERLIPIDEMIA, UNSPECIFIED HYPERLIPIDEMIA TYPE: ICD-10-CM

## 2024-09-25 DIAGNOSIS — I35.1 NONRHEUMATIC AORTIC VALVE INSUFFICIENCY: ICD-10-CM

## 2024-09-25 DIAGNOSIS — I10 ESSENTIAL HYPERTENSION: Primary | ICD-10-CM

## 2024-09-25 PROCEDURE — 99214 OFFICE O/P EST MOD 30 MIN: CPT | Performed by: FAMILY MEDICINE

## 2024-09-25 PROCEDURE — 3074F SYST BP LT 130 MM HG: CPT | Performed by: FAMILY MEDICINE

## 2024-09-25 PROCEDURE — 3078F DIAST BP <80 MM HG: CPT | Performed by: FAMILY MEDICINE

## 2024-09-25 PROCEDURE — 93000 ELECTROCARDIOGRAM COMPLETE: CPT | Performed by: FAMILY MEDICINE

## 2025-01-03 DIAGNOSIS — I10 ESSENTIAL HYPERTENSION: ICD-10-CM

## 2025-01-03 DIAGNOSIS — E78.5 HYPERLIPIDEMIA, UNSPECIFIED HYPERLIPIDEMIA TYPE: Primary | ICD-10-CM

## 2025-01-03 DIAGNOSIS — E03.9 ADULT HYPOTHYROIDISM: ICD-10-CM

## 2025-01-03 LAB
ALBUMIN SERPL-MCNC: 3.9 G/DL (ref 3.5–5.2)
ALBUMIN/GLOB SERPL: 1.6 G/DL
ALP SERPL-CCNC: 49 U/L (ref 39–117)
ALT SERPL-CCNC: 15 U/L (ref 1–33)
AST SERPL-CCNC: 24 U/L (ref 1–32)
BASOPHILS # BLD AUTO: 0.04 10*3/MM3 (ref 0–0.2)
BASOPHILS NFR BLD AUTO: 0.7 % (ref 0–1.5)
BILIRUB SERPL-MCNC: 0.8 MG/DL (ref 0–1.2)
BUN SERPL-MCNC: 11 MG/DL (ref 8–23)
BUN/CREAT SERPL: 16.4 (ref 7–25)
CALCIUM SERPL-MCNC: 8.9 MG/DL (ref 8.6–10.5)
CHLORIDE SERPL-SCNC: 99 MMOL/L (ref 98–107)
CHOLEST SERPL-MCNC: 144 MG/DL (ref 0–200)
CO2 SERPL-SCNC: 27.3 MMOL/L (ref 22–29)
CREAT SERPL-MCNC: 0.67 MG/DL (ref 0.57–1)
EGFRCR SERPLBLD CKD-EPI 2021: 89.6 ML/MIN/1.73
EOSINOPHIL # BLD AUTO: 0.05 10*3/MM3 (ref 0–0.4)
EOSINOPHIL NFR BLD AUTO: 0.9 % (ref 0.3–6.2)
ERYTHROCYTE [DISTWIDTH] IN BLOOD BY AUTOMATED COUNT: 13.9 % (ref 12.3–15.4)
GLOBULIN SER CALC-MCNC: 2.5 GM/DL
GLUCOSE SERPL-MCNC: 84 MG/DL (ref 65–99)
HCT VFR BLD AUTO: 38 % (ref 34–46.6)
HDLC SERPL-MCNC: 67 MG/DL (ref 40–60)
HGB BLD-MCNC: 13 G/DL (ref 12–15.9)
IMM GRANULOCYTES # BLD AUTO: 0.01 10*3/MM3 (ref 0–0.05)
IMM GRANULOCYTES NFR BLD AUTO: 0.2 % (ref 0–0.5)
LDLC SERPL CALC-MCNC: 67 MG/DL (ref 0–100)
LYMPHOCYTES # BLD AUTO: 1.12 10*3/MM3 (ref 0.7–3.1)
LYMPHOCYTES NFR BLD AUTO: 20.3 % (ref 19.6–45.3)
MCH RBC QN AUTO: 31.1 PG (ref 26.6–33)
MCHC RBC AUTO-ENTMCNC: 34.2 G/DL (ref 31.5–35.7)
MCV RBC AUTO: 90.9 FL (ref 79–97)
MONOCYTES # BLD AUTO: 0.54 10*3/MM3 (ref 0.1–0.9)
MONOCYTES NFR BLD AUTO: 9.8 % (ref 5–12)
NEUTROPHILS # BLD AUTO: 3.77 10*3/MM3 (ref 1.7–7)
NEUTROPHILS NFR BLD AUTO: 68.1 % (ref 42.7–76)
NRBC BLD AUTO-RTO: 0 /100 WBC (ref 0–0.2)
PLATELET # BLD AUTO: 151 10*3/MM3 (ref 140–450)
POTASSIUM SERPL-SCNC: 4.3 MMOL/L (ref 3.5–5.2)
PROT SERPL-MCNC: 6.4 G/DL (ref 6–8.5)
RBC # BLD AUTO: 4.18 10*6/MM3 (ref 3.77–5.28)
SODIUM SERPL-SCNC: 137 MMOL/L (ref 136–145)
TRIGL SERPL-MCNC: 43 MG/DL (ref 0–150)
TSH SERPL DL<=0.005 MIU/L-ACNC: 2.06 UIU/ML (ref 0.27–4.2)
VLDLC SERPL CALC-MCNC: 10 MG/DL (ref 5–40)
WBC # BLD AUTO: 5.53 10*3/MM3 (ref 3.4–10.8)

## 2025-01-21 ENCOUNTER — OFFICE VISIT (OUTPATIENT)
Dept: INTERNAL MEDICINE | Facility: CLINIC | Age: 80
End: 2025-01-21
Payer: MEDICARE

## 2025-01-21 VITALS
HEART RATE: 51 BPM | DIASTOLIC BLOOD PRESSURE: 82 MMHG | HEIGHT: 61 IN | BODY MASS INDEX: 19.63 KG/M2 | SYSTOLIC BLOOD PRESSURE: 130 MMHG | WEIGHT: 104 LBS | OXYGEN SATURATION: 98 %

## 2025-01-21 DIAGNOSIS — I10 ESSENTIAL HYPERTENSION: Primary | ICD-10-CM

## 2025-01-21 DIAGNOSIS — M81.0 OSTEOPOROSIS, UNSPECIFIED OSTEOPOROSIS TYPE, UNSPECIFIED PATHOLOGICAL FRACTURE PRESENCE: ICD-10-CM

## 2025-01-21 DIAGNOSIS — E03.9 ADULT HYPOTHYROIDISM: ICD-10-CM

## 2025-01-21 DIAGNOSIS — E78.5 HYPERLIPIDEMIA, UNSPECIFIED HYPERLIPIDEMIA TYPE: ICD-10-CM

## 2025-01-21 DIAGNOSIS — R39.9 UTI SYMPTOMS: ICD-10-CM

## 2025-01-21 PROBLEM — E07.9: Status: ACTIVE | Noted: 2024-07-05

## 2025-01-21 PROBLEM — H50.69: Status: ACTIVE | Noted: 2024-07-05

## 2025-01-21 PROBLEM — H40.1133 PRIMARY OPEN-ANGLE GLAUCOMA, BILATERAL, SEVERE STAGE: Status: ACTIVE | Noted: 2024-10-23

## 2025-01-21 LAB
BILIRUB BLD-MCNC: NEGATIVE MG/DL
CLARITY, POC: ABNORMAL
COLOR UR: YELLOW
EXPIRATION DATE: ABNORMAL
GLUCOSE UR STRIP-MCNC: NEGATIVE MG/DL
KETONES UR QL: NEGATIVE
LEUKOCYTE EST, POC: ABNORMAL
Lab: ABNORMAL
NITRITE UR-MCNC: NEGATIVE MG/ML
PH UR: 7.5 [PH] (ref 5–8)
PROT UR STRIP-MCNC: NEGATIVE MG/DL
RBC # UR STRIP: NEGATIVE /UL
SP GR UR: 1.01 (ref 1–1.03)
UROBILINOGEN UR QL: ABNORMAL

## 2025-01-21 PROCEDURE — 1126F AMNT PAIN NOTED NONE PRSNT: CPT | Performed by: INTERNAL MEDICINE

## 2025-01-21 PROCEDURE — 3079F DIAST BP 80-89 MM HG: CPT | Performed by: INTERNAL MEDICINE

## 2025-01-21 PROCEDURE — 99214 OFFICE O/P EST MOD 30 MIN: CPT | Performed by: INTERNAL MEDICINE

## 2025-01-21 PROCEDURE — 1159F MED LIST DOCD IN RCRD: CPT | Performed by: INTERNAL MEDICINE

## 2025-01-21 PROCEDURE — 1160F RVW MEDS BY RX/DR IN RCRD: CPT | Performed by: INTERNAL MEDICINE

## 2025-01-21 PROCEDURE — 81003 URINALYSIS AUTO W/O SCOPE: CPT | Performed by: INTERNAL MEDICINE

## 2025-01-21 PROCEDURE — 3075F SYST BP GE 130 - 139MM HG: CPT | Performed by: INTERNAL MEDICINE

## 2025-01-21 NOTE — PROGRESS NOTES
Subjective     Jasmine Banegas is a 79 y.o. female who presents with   Chief Complaint   Patient presents with    Hypertension    Hyperlipidemia    Hypothyroidism       Hypertension    Hyperlipidemia  Exacerbating diseases include hypothyroidism.   Hypothyroidism  Her past medical history is significant for hyperlipidemia.        The following data was reviewed by: Chanel Duncan MD on 01/21/2025:  Common labs          6/28/2024    09:14 7/30/2024    13:12 1/3/2025    09:24   Common Labs   Glucose 82  84  84    BUN 15  19  11    Creatinine 0.67  0.63  0.67    Sodium 139  140  137    Potassium 4.3  5.0  4.3    Chloride 102  102  99    Calcium 9.2  9.0  8.9    Total Protein 6.3   6.4    Albumin 4.2   3.9    Total Bilirubin 0.5   0.8    Alkaline Phosphatase 59   49    AST (SGOT) 26   24    ALT (SGPT) 15   15    WBC 5.01   5.53    Hemoglobin 12.5   13.0    Hematocrit 38.4   38.0    Platelets 162   151    Total Cholesterol 143   144    Triglycerides 39   43    HDL Cholesterol 61   67    LDL Cholesterol  73   67      HTN.  Blood pressure is under good control.  HLD.  LDL cholesterol is under good control.   Hypothyroidism.  TSH is in normal range.      Some burning with urination over the weekend.  Better now.    Review of Systems   Genitourinary:  Positive for dysuria, frequency and urgency.       The following portions of the patient's history were reviewed and updated as appropriate: allergies, current medications and problem list.    Patient Active Problem List    Diagnosis Date Noted    Primary open-angle glaucoma, bilateral, severe stage 10/23/2024    Strabismus due to thyroid disease 07/05/2024    Nonrheumatic aortic valve insufficiency 09/20/2023    PSVT (paroxysmal supraventricular tachycardia) 09/20/2023    Thoracic aortic ectasia 09/11/2023    History of diverticulitis 03/08/2022    Graves' eye disease 07/14/2020    Osteoarthritis of knee 03/19/2018    Glaucoma of both eyes 03/14/2017    Overactive bladder  "03/07/2016    HLD (hyperlipidemia) 02/25/2016     Note Last Updated: 2/25/2016     Description: Statins started in 2/2014 secondary to 7.6% 10 year ASCVD risk.      Adult hypothyroidism 02/25/2016    OP (osteoporosis) 02/25/2016     Note Last Updated: 2/25/2016     Description: Fosamax [id number]  Prolia started in 2014. Recommend 1200mg calcium and 1000IUs D in diet/supplements.      Essential hypertension 02/25/2016     Note Last Updated: 2/25/2016     Impression: 02/12/2014 - Blood pressure is well-controlled when not in the office.  I do encourage to check regularly.;          Current Outpatient Medications on File Prior to Visit   Medication Sig Dispense Refill    Calcium-Magnesium-Vitamin D - MG-MG-UNIT tablet sustained-release 24 hour Take 1 tablet by mouth Every Morning.      Coenzyme Q10 (CO Q 10 PO) Take 200 mg by mouth Daily.      fluticasone (FLONASE) 50 MCG/ACT nasal spray 2 sprays into the nostril(s) as directed by provider Daily. 16 g 5    latanoprost (XALATAN) 0.005 % ophthalmic solution Administer 1 drop into the left eye Every Night.      levothyroxine (SYNTHROID, LEVOTHROID) 75 MCG tablet Take 1 tablet by mouth Daily. 90 tablet 3    lisinopril (PRINIVIL,ZESTRIL) 10 MG tablet Take 1 tablet by mouth Daily. 90 tablet 3    MAGNESIUM PO Take  by mouth.      montelukast (SINGULAIR) 10 MG tablet Take 1 tablet by mouth Daily. 90 tablet 3    Multiple Vitamins-Minerals (ZINC PO) Take 50 mg by mouth Daily.      multivitamin with minerals tablet tablet Take 1 tablet by mouth Daily.      Omega-3 Fatty Acids (OMEGA 3 PO) Take 1,280 mg by mouth Daily.      simvastatin (ZOCOR) 20 MG tablet Take 1 tablet by mouth every night at bedtime. 90 tablet 3    timolol (TIMOPTIC) 0.5 % ophthalmic solution INSTILL 1 DROP INTO EACH EYE TWICE DAILY       No current facility-administered medications on file prior to visit.       Objective     /82   Pulse 51   Ht 154.9 cm (60.98\")   Wt 47.2 kg (104 lb)   " SpO2 98%   BMI 19.66 kg/m²     Physical Exam  Constitutional:       Appearance: She is well-developed.   HENT:      Head: Normocephalic and atraumatic.   Pulmonary:      Effort: Pulmonary effort is normal.   Neurological:      Mental Status: She is alert and oriented to person, place, and time.   Psychiatric:         Behavior: Behavior normal.         Assessment & Plan   Diagnoses and all orders for this visit:    1. Essential hypertension (Primary)    2. Hyperlipidemia, unspecified hyperlipidemia type    3. Adult hypothyroidism    4. UTI symptoms  -     Urine Culture - Urine, Urine, Clean Catch  -     POC Urinalysis Dipstick, Automated        Discussion    HTN.  Control is good.  The patient is advised to continue current dosage of simvastatin.    HLD.  Control is good.  The patient is advised to continue current dosage of simvastatin.    Hypothyroidism. Control is good.  The patient is advised to continue current dosage of levothyroxine.    OP.  I recommend to get 1200 mg of calcium and 1000 IUs of vitamin D through diet and supplements and to participate in a weight based exercise to prevent loss of bone mineral density. Bone mineral will be monitored every two years.  Continue Prolia.    UTI symptoms.  Urine dip largely unremarkable.  Check urine culture.               Future Appointments   Date Time Provider Department Center   1/31/2025 10:00 AM REFERRED INJECTION CHAIR [facility]   7/11/2025  9:30 AM [facility]   7/16/2025 10:00 AM Chanel Duncan MD [facility]   10/9/2025 10:20 AM June Santos MD [facility]

## 2025-01-23 LAB
BACTERIA UR CULT: ABNORMAL
BACTERIA UR CULT: ABNORMAL
OTHER ANTIBIOTIC SUSC ISLT: ABNORMAL

## 2025-01-24 RX ORDER — CEPHALEXIN 500 MG/1
500 CAPSULE ORAL 2 TIMES DAILY
Qty: 14 CAPSULE | Refills: 0 | Status: SHIPPED | OUTPATIENT
Start: 2025-01-24

## 2025-01-31 ENCOUNTER — INFUSION (OUTPATIENT)
Dept: ONCOLOGY | Facility: HOSPITAL | Age: 80
End: 2025-01-31
Payer: MEDICARE

## 2025-01-31 DIAGNOSIS — M81.0 OSTEOPOROSIS, UNSPECIFIED OSTEOPOROSIS TYPE, UNSPECIFIED PATHOLOGICAL FRACTURE PRESENCE: Primary | ICD-10-CM

## 2025-01-31 PROCEDURE — 25010000002 DENOSUMAB 60 MG/ML SOLUTION PREFILLED SYRINGE: Performed by: INTERNAL MEDICINE

## 2025-01-31 PROCEDURE — 96372 THER/PROPH/DIAG INJ SC/IM: CPT

## 2025-01-31 RX ADMIN — DENOSUMAB 60 MG: 60 INJECTION SUBCUTANEOUS at 10:09

## 2025-07-11 DIAGNOSIS — E03.9 ADULT HYPOTHYROIDISM: ICD-10-CM

## 2025-07-11 DIAGNOSIS — E78.5 HYPERLIPIDEMIA, UNSPECIFIED HYPERLIPIDEMIA TYPE: Primary | ICD-10-CM

## 2025-07-11 DIAGNOSIS — I10 ESSENTIAL HYPERTENSION: ICD-10-CM

## 2025-07-11 LAB
ALBUMIN SERPL-MCNC: 4.1 G/DL (ref 3.5–5.2)
ALBUMIN/GLOB SERPL: 1.6 G/DL
ALP SERPL-CCNC: 54 U/L (ref 39–117)
ALT SERPL-CCNC: 18 U/L (ref 1–33)
APPEARANCE UR: CLEAR
AST SERPL-CCNC: 27 U/L (ref 1–32)
BACTERIA #/AREA URNS HPF: NORMAL /HPF
BASOPHILS # BLD AUTO: 0.05 10*3/MM3 (ref 0–0.2)
BASOPHILS NFR BLD AUTO: 0.7 % (ref 0–1.5)
BILIRUB SERPL-MCNC: 0.8 MG/DL (ref 0–1.2)
BILIRUB UR QL STRIP: NEGATIVE
BUN SERPL-MCNC: 10 MG/DL (ref 8–23)
BUN/CREAT SERPL: 13 (ref 7–25)
CALCIUM SERPL-MCNC: 9.2 MG/DL (ref 8.6–10.5)
CASTS URNS MICRO: NORMAL
CHLORIDE SERPL-SCNC: 100 MMOL/L (ref 98–107)
CHOLEST SERPL-MCNC: 142 MG/DL (ref 0–200)
CO2 SERPL-SCNC: 27.6 MMOL/L (ref 22–29)
COLOR UR: YELLOW
CREAT SERPL-MCNC: 0.77 MG/DL (ref 0.57–1)
EGFRCR SERPLBLD CKD-EPI 2021: 79.1 ML/MIN/1.73
EOSINOPHIL # BLD AUTO: 0.1 10*3/MM3 (ref 0–0.4)
EOSINOPHIL NFR BLD AUTO: 1.4 % (ref 0.3–6.2)
EPI CELLS #/AREA URNS HPF: NORMAL /HPF
ERYTHROCYTE [DISTWIDTH] IN BLOOD BY AUTOMATED COUNT: 13 % (ref 12.3–15.4)
GLOBULIN SER CALC-MCNC: 2.6 GM/DL
GLUCOSE SERPL-MCNC: 78 MG/DL (ref 65–99)
GLUCOSE UR QL STRIP: NEGATIVE
HCT VFR BLD AUTO: 39.8 % (ref 34–46.6)
HDLC SERPL-MCNC: 66 MG/DL (ref 40–60)
HGB BLD-MCNC: 13.4 G/DL (ref 12–15.9)
HGB UR QL STRIP: NEGATIVE
IMM GRANULOCYTES # BLD AUTO: 0.03 10*3/MM3 (ref 0–0.05)
IMM GRANULOCYTES NFR BLD AUTO: 0.4 % (ref 0–0.5)
KETONES UR QL STRIP: NEGATIVE
LDLC SERPL CALC-MCNC: 66 MG/DL (ref 0–100)
LEUKOCYTE ESTERASE UR QL STRIP: NEGATIVE
LYMPHOCYTES # BLD AUTO: 1.26 10*3/MM3 (ref 0.7–3.1)
LYMPHOCYTES NFR BLD AUTO: 17.4 % (ref 19.6–45.3)
MCH RBC QN AUTO: 30.8 PG (ref 26.6–33)
MCHC RBC AUTO-ENTMCNC: 33.7 G/DL (ref 31.5–35.7)
MCV RBC AUTO: 91.5 FL (ref 79–97)
MONOCYTES # BLD AUTO: 0.77 10*3/MM3 (ref 0.1–0.9)
MONOCYTES NFR BLD AUTO: 10.7 % (ref 5–12)
NEUTROPHILS # BLD AUTO: 5.02 10*3/MM3 (ref 1.7–7)
NEUTROPHILS NFR BLD AUTO: 69.4 % (ref 42.7–76)
NITRITE UR QL STRIP: NEGATIVE
NRBC BLD AUTO-RTO: 0 /100 WBC (ref 0–0.2)
PH UR STRIP: 8 [PH] (ref 5–8)
PLATELET # BLD AUTO: 181 10*3/MM3 (ref 140–450)
POTASSIUM SERPL-SCNC: 4.8 MMOL/L (ref 3.5–5.2)
PROT SERPL-MCNC: 6.7 G/DL (ref 6–8.5)
PROT UR QL STRIP: NEGATIVE
RBC # BLD AUTO: 4.35 10*6/MM3 (ref 3.77–5.28)
RBC #/AREA URNS HPF: NORMAL /HPF
SODIUM SERPL-SCNC: 137 MMOL/L (ref 136–145)
SP GR UR STRIP: 1.01 (ref 1–1.03)
TRIGL SERPL-MCNC: 43 MG/DL (ref 0–150)
TSH SERPL DL<=0.005 MIU/L-ACNC: 1.52 UIU/ML (ref 0.27–4.2)
UROBILINOGEN UR STRIP-MCNC: NORMAL MG/DL
VLDLC SERPL CALC-MCNC: 10 MG/DL (ref 5–40)
WBC # BLD AUTO: 7.23 10*3/MM3 (ref 3.4–10.8)
WBC #/AREA URNS HPF: NORMAL /HPF

## 2025-07-16 ENCOUNTER — OFFICE VISIT (OUTPATIENT)
Dept: INTERNAL MEDICINE | Facility: CLINIC | Age: 80
End: 2025-07-16
Payer: MEDICARE

## 2025-07-16 VITALS
OXYGEN SATURATION: 97 % | SYSTOLIC BLOOD PRESSURE: 144 MMHG | BODY MASS INDEX: 19.07 KG/M2 | HEART RATE: 67 BPM | HEIGHT: 61 IN | WEIGHT: 101 LBS | DIASTOLIC BLOOD PRESSURE: 78 MMHG

## 2025-07-16 DIAGNOSIS — Z12.31 ENCOUNTER FOR SCREENING MAMMOGRAM FOR BREAST CANCER: ICD-10-CM

## 2025-07-16 DIAGNOSIS — Z00.00 WELL ADULT EXAM: ICD-10-CM

## 2025-07-16 DIAGNOSIS — I10 ESSENTIAL HYPERTENSION: ICD-10-CM

## 2025-07-16 DIAGNOSIS — E03.9 ADULT HYPOTHYROIDISM: ICD-10-CM

## 2025-07-16 DIAGNOSIS — E78.5 HYPERLIPIDEMIA, UNSPECIFIED HYPERLIPIDEMIA TYPE: ICD-10-CM

## 2025-07-16 DIAGNOSIS — Z00.00 MEDICARE ANNUAL WELLNESS VISIT, SUBSEQUENT: Primary | ICD-10-CM

## 2025-07-16 PROCEDURE — 1160F RVW MEDS BY RX/DR IN RCRD: CPT | Performed by: INTERNAL MEDICINE

## 2025-07-16 PROCEDURE — 1159F MED LIST DOCD IN RCRD: CPT | Performed by: INTERNAL MEDICINE

## 2025-07-16 PROCEDURE — 1170F FXNL STATUS ASSESSED: CPT | Performed by: INTERNAL MEDICINE

## 2025-07-16 PROCEDURE — G0439 PPPS, SUBSEQ VISIT: HCPCS | Performed by: INTERNAL MEDICINE

## 2025-07-16 PROCEDURE — 3077F SYST BP >= 140 MM HG: CPT | Performed by: INTERNAL MEDICINE

## 2025-07-16 PROCEDURE — 99397 PER PM REEVAL EST PAT 65+ YR: CPT | Performed by: INTERNAL MEDICINE

## 2025-07-16 PROCEDURE — 1126F AMNT PAIN NOTED NONE PRSNT: CPT | Performed by: INTERNAL MEDICINE

## 2025-07-16 PROCEDURE — 3078F DIAST BP <80 MM HG: CPT | Performed by: INTERNAL MEDICINE

## 2025-07-16 RX ORDER — MONTELUKAST SODIUM 10 MG/1
10 TABLET ORAL DAILY
Qty: 90 TABLET | Refills: 3 | Status: SHIPPED | OUTPATIENT
Start: 2025-07-16

## 2025-07-16 RX ORDER — LISINOPRIL 10 MG/1
10 TABLET ORAL DAILY
Qty: 90 TABLET | Refills: 3 | Status: SHIPPED | OUTPATIENT
Start: 2025-07-16

## 2025-07-16 RX ORDER — SIMVASTATIN 20 MG
20 TABLET ORAL
Qty: 90 TABLET | Refills: 3 | Status: SHIPPED | OUTPATIENT
Start: 2025-07-16

## 2025-07-16 RX ORDER — LEVOTHYROXINE SODIUM 75 UG/1
75 TABLET ORAL DAILY
Qty: 90 TABLET | Refills: 3 | Status: SHIPPED | OUTPATIENT
Start: 2025-07-16

## 2025-07-16 NOTE — PROGRESS NOTES
Subjective   The ABCs of the Annual Wellness Visit  Medicare Wellness Visit      Jasmine Banegas is a 79 y.o. patient who presents for a Medicare Wellness Visit.    The following portions of the patient's history were reviewed and   updated as appropriate: allergies, current medications, past family history, past medical history, past social history, past surgical history, and problem list.    Compared to one year ago, the patient's physical   health is the same.  Compared to one year ago, the patient's mental   health is the same.    Recent Hospitalizations:  She was not admitted to the hospital during the last year.     Current Medical Providers:  Patient Care Team:  Chanel Duncan MD as PCP - General  Chanel Duncan MD as PCP - Family Medicine  Negrito Dennis MD as Consulting Physician (Ophthalmology)  Boaz Montalvo MD as Consulting Physician (Pediatric Ophthalmology)    Outpatient Medications Prior to Visit   Medication Sig Dispense Refill    Calcium-Magnesium-Vitamin D - MG-MG-UNIT tablet sustained-release 24 hour Take 1 tablet by mouth Every Morning.      Coenzyme Q10 (CO Q 10 PO) Take 200 mg by mouth Daily.      latanoprost (XALATAN) 0.005 % ophthalmic solution Administer 1 drop into the left eye Every Night.      MAGNESIUM PO Take  by mouth.      Multiple Vitamins-Minerals (ZINC PO) Take 50 mg by mouth Daily.      multivitamin with minerals tablet tablet Take 1 tablet by mouth Daily.      Omega-3 Fatty Acids (OMEGA 3 PO) Take 1,280 mg by mouth Daily.      timolol (TIMOPTIC) 0.5 % ophthalmic solution INSTILL 1 DROP INTO EACH EYE TWICE DAILY      cephalexin (Keflex) 500 MG capsule Take 1 capsule by mouth 2 (Two) Times a Day. 14 capsule 0    fluticasone (FLONASE) 50 MCG/ACT nasal spray 2 sprays into the nostril(s) as directed by provider Daily. 16 g 5    levothyroxine (SYNTHROID, LEVOTHROID) 75 MCG tablet Take 1 tablet by mouth Daily. 90 tablet 3    lisinopril (PRINIVIL,ZESTRIL)  "10 MG tablet Take 1 tablet by mouth Daily. 90 tablet 3    montelukast (SINGULAIR) 10 MG tablet Take 1 tablet by mouth Daily. 90 tablet 3    simvastatin (ZOCOR) 20 MG tablet Take 1 tablet by mouth every night at bedtime. 90 tablet 3     No facility-administered medications prior to visit.     No opioid medication identified on active medication list. I have reviewed chart for other potential  high risk medication/s and harmful drug interactions in the elderly.      Aspirin is not on active medication list.  Aspirin use is not indicated based on review of current medical condition/s. Risk of harm outweighs potential benefits.  .    Patient Active Problem List   Diagnosis    HLD (hyperlipidemia)    Adult hypothyroidism    OP (osteoporosis)    Essential hypertension    Overactive bladder    Glaucoma of both eyes    Osteoarthritis of knee    Graves' eye disease    History of diverticulitis    Thoracic aortic ectasia    Nonrheumatic aortic valve insufficiency    PSVT (paroxysmal supraventricular tachycardia)    Primary open-angle glaucoma, bilateral, severe stage    Strabismus due to thyroid disease     Advance Care Planning Advance Directive is not on file.  ACP discussion was held with the patient during this visit. Patient has an advance directive (not in EMR), copy requested.            Objective   Vitals:    07/16/25 0956   BP: 144/78   Pulse: 67   SpO2: 97%   Weight: 45.8 kg (101 lb)   Height: 154.9 cm (60.98\")   PainSc: 0-No pain       Estimated body mass index is 19.09 kg/m² as calculated from the following:    Height as of this encounter: 154.9 cm (60.98\").    Weight as of this encounter: 45.8 kg (101 lb).    BMI is within normal parameters. No other follow-up for BMI required.           Does the patient have evidence of cognitive impairment? No  Lab Results   Component Value Date    CHLPL 142 07/11/2025    TRIG 43 07/11/2025    HDL 66 (H) 07/11/2025    LDL 66 07/11/2025    VLDL 10 07/11/2025                      "                                                                           Health  Risk Assessment    Smoking Status:  Social History     Tobacco Use   Smoking Status Never    Passive exposure: Never   Smokeless Tobacco Never   Tobacco Comments    Caffeine: 2-3 coffee daily     Alcohol Consumption:  Social History     Substance and Sexual Activity   Alcohol Use Yes    Comment: HOLID[employer] DRINK[employer]       Fall Risk Screen  AMADAADI Fall Risk Assessment was completed, and patient is at LOW risk for falls.Assessment completed on:2025    Depression Screening   Little interest or pleasure in doing things? Not at all   Feeling down, depressed, or hopeless? Not at all   PHQ-2 Total Score 0      Health Habits and Functional and Cognitive Screenin/16/2025     9:58 AM   Functional & Cognitive Status   Do you have difficulty preparing food and eating? No   Do you have difficulty bathing yourself, getting dressed or grooming yourself? No   Do you have difficulty using the toilet? No   Do you have difficulty moving around from place to place? No   Do you have trouble with steps or getting out of a bed or a chair? No   Current Diet Well Balanced Diet   Dental Exam Up to date   Eye Exam Up to date   Exercise (times per week) 3 times per week   Current Exercises Include Walking   Do you need help using the phone?  No   Are you deaf or do you have serious difficulty hearing?  No   Do you need help to go to places out of walking distance? No   Do you need help shopping? No   Do you need help preparing meals?  No   Do you need help with housework?  No   Do you need help with laundry? No   Do you need help taking your medications? No   Do you need help managing money? No   Do you ever drive or ride in a car without wearing a seat belt? No   Who do you live with? Alone   If you need help, do you have trouble finding someone available to you? No   Have you been bothered in the last four weeks by sexual problems? No   Do you have  difficulty concentrating, remembering or making decisions? No           Age-appropriate Screening Schedule:  Refer to the list below for future screening recommendations based on patient's age, sex and/or medical conditions. Orders for these recommended tests are listed in the plan section. The patient has been provided with a written plan.    Health Maintenance List  Health Maintenance   Topic Date Due    ZOSTER VACCINE (1 of 2) Never done    RSV Vaccine - Adults (1 - 1-dose 75+ series) Never done    TDAP/TD VACCINES (2 - Td or Tdap) 10/01/2022    COVID-19 Vaccine (4 - 2024-25 season) 09/01/2024    ANNUAL WELLNESS VISIT  07/03/2025    INFLUENZA VACCINE  10/01/2025    LIPID PANEL  07/11/2026    DXA SCAN  07/23/2026    MAMMOGRAM  07/31/2026    COLORECTAL CANCER SCREENING  04/11/2032    HEPATITIS C SCREENING  Completed    Pneumococcal Vaccine 50+  Completed                                                                                                                                                CMS Preventative Services Quick Reference  Risk Factors Identified During Encounter  Immunizations Discussed/Encouraged: Influenza, Shingrix, COVID19, and RSV (Respiratory Syncytial Virus)    The above risks/problems have been discussed with the patient.  Pertinent information has been shared with the patient in the After Visit Summary.  An After Visit Summary and PPPS were made available to the patient.    Follow Up:   Next Medicare Wellness visit to be scheduled in 1 year.         Additional E&M Note during same encounter follows:  Patient has additional, significant, and separately identifiable condition(s)/problem(s) that require work above and beyond the Medicare Wellness Visit     Chief Complaint  Medicare Wellness-subsequent    Subjective   HPI  Jasmine is also being seen today for CPE    HTN.  BP is good at home. HLD.  LDL cholesterol is under good control.   Hypothyroidism.  TSH is in normal range.      Review of  "Systems   Respiratory: Negative.     Cardiovascular: Negative.               Objective   Vital Signs:  /78   Pulse 67   Ht 154.9 cm (60.98\")   Wt 45.8 kg (101 lb)   SpO2 97%   BMI 19.09 kg/m²   Physical Exam  Constitutional:       Appearance: She is well-developed.   HENT:      Head: Normocephalic and atraumatic.      Right Ear: Hearing, tympanic membrane and external ear normal.      Left Ear: Hearing, tympanic membrane and external ear normal.      Nose: Nose normal.   Neck:      Thyroid: No thyromegaly.   Cardiovascular:      Rate and Rhythm: Normal rate and regular rhythm.      Heart sounds: Normal heart sounds. No murmur heard.  Pulmonary:      Effort: Pulmonary effort is normal.      Breath sounds: Normal breath sounds.   Abdominal:      General: There is no distension.      Palpations: Abdomen is soft.      Tenderness: There is no abdominal tenderness.   Musculoskeletal:      Cervical back: Neck supple.   Lymphadenopathy:      Cervical: No cervical adenopathy.   Skin:     General: Skin is warm and dry.   Neurological:      Mental Status: She is alert and oriented to person, place, and time.   Psychiatric:         Speech: Speech normal.         Behavior: Behavior normal.         Thought Content: Thought content normal.         Judgment: Judgment normal.         The following data was reviewed by: Chanel Duncan MD on 07/16/2025:    Common labs          7/30/2024    13:12 1/3/2025    09:24 7/11/2025    09:32   Common Labs   Glucose 84  84  78    BUN 19  11  10.0    Creatinine 0.63  0.67  0.77    Sodium 140  137  137    Potassium 5.0  4.3  4.8    Chloride 102  99  100    Calcium 9.0  8.9  9.2    Albumin  3.9  4.1    Total Bilirubin  0.8  0.8    Alkaline Phosphatase  49  54    AST (SGOT)  24  27    ALT (SGPT)  15  18    WBC  5.53  7.23    Hemoglobin  13.0  13.4    Hematocrit  38.0  39.8    Platelets  151  181    Total Cholesterol  144  142    Triglycerides  43  43    HDL Cholesterol  67  66    LDL " Cholesterol   67  66           Assessment and Plan Additional age appropriate preventative wellness advice topics were discussed during today's preventative wellness exam(some topics already addressed during AWV portion of the note above):    Physical Activity: Advised cardiovascular activity 150 minutes per week as tolerated. (example brisk walk for 30 minutes, 5 days a week).     Medicare annual wellness visit, subsequent         Well adult exam         Essential hypertension      Orders:    lisinopril (PRINIVIL,ZESTRIL) 10 MG tablet; Take 1 tablet by mouth Daily.    Hyperlipidemia, unspecified hyperlipidemia type       Orders:    simvastatin (ZOCOR) 20 MG tablet; Take 1 tablet by mouth every night at bedtime.    Adult hypothyroidism    Orders:    levothyroxine (SYNTHROID, LEVOTHROID) 75 MCG tablet; Take 1 tablet by mouth Daily.    Encounter for screening mammogram for breast cancer    Orders:    Mammo Screening Digital Tomosynthesis Bilateral With CAD; Future     Discussed importance of preventative care including vaccinations, age appropriate cancer screening, routine lab work, healthy diet, and active lifestyle.  HTN.  Control is good at home.  The patient is advised to continue current dosage of lisinopril.    HLD.  Control is good.  The patient is advised to continue current dosage of simvastatin.    Hypothyroidism. Control is good.  The patient is advised to continue current dosage of levothyroxine.    OP.  I recommend to get 1200 mg of calcium and 1000 IUs of vitamin D through diet and supplements and to participate in a weight based exercise to prevent loss of bone mineral density. Bone mineral will be monitored every two years.  Continue Prolia.       Follow Up   No follow-ups on file.  Patient was given instructions and counseling regarding her condition or for health maintenance advice. Please see specific information pulled into the AVS if appropriate.

## 2025-07-16 NOTE — ASSESSMENT & PLAN NOTE
Orders:    simvastatin (ZOCOR) 20 MG tablet; Take 1 tablet by mouth every night at bedtime.

## 2025-08-06 ENCOUNTER — INFUSION (OUTPATIENT)
Dept: ONCOLOGY | Facility: HOSPITAL | Age: 80
End: 2025-08-06
Payer: MEDICARE

## 2025-08-06 DIAGNOSIS — M81.0 OSTEOPOROSIS, UNSPECIFIED OSTEOPOROSIS TYPE, UNSPECIFIED PATHOLOGICAL FRACTURE PRESENCE: Primary | ICD-10-CM

## 2025-08-06 PROCEDURE — 25010000002 DENOSUMAB 60 MG/ML SOLUTION PREFILLED SYRINGE: Performed by: INTERNAL MEDICINE

## 2025-08-06 PROCEDURE — 96372 THER/PROPH/DIAG INJ SC/IM: CPT

## 2025-08-06 RX ADMIN — DENOSUMAB 60 MG: 60 INJECTION SUBCUTANEOUS at 10:16

## (undated) DEVICE — SUT VIC 5/0 P3 18IN J493G

## (undated) DEVICE — CODMAN® SURGICAL PATTIES 1/2" X 3" (1.27CM X 7.62CM): Brand: CODMAN®

## (undated) DEVICE — GLV SURG BIOGEL SENSR LTX PF SZ7.5

## (undated) DEVICE — ELECTRD NDL EZ CLN MOD 2.75IN

## (undated) DEVICE — SYR LL TP 10ML STRL

## (undated) DEVICE — GOWN ,SIRUS,NONREINFORCED SMALL: Brand: MEDLINE

## (undated) DEVICE — CROUCH CORNEAL PROTECTOR: Brand: BAUSCH + LOMB

## (undated) DEVICE — PK ENT 40

## (undated) DEVICE — INTENDED FOR TISSUE SEPARATION, AND OTHER PROCEDURES THAT REQUIRE A SHARP SURGICAL BLADE TO PUNCTURE OR CUT.: Brand: BARD-PARKER ® STAINLESS STEEL BLADES

## (undated) DEVICE — GLV SURG BIOGEL LTX PF 6 1/2

## (undated) DEVICE — SUT PROLN 4/0 V5 36IN 8935H

## (undated) DEVICE — NDL HYPO PRECISIONGLIDE REG 25G 1 1/2

## (undated) DEVICE — WIPE INST MEROCEL

## (undated) DEVICE — TUBING, SUCTION, 1/4" X 10', STRAIGHT: Brand: MEDLINE

## (undated) DEVICE — STERILE COTTON TIP 6IN 10PK: Brand: MEDLINE

## (undated) DEVICE — ADAPT CLN BIOGUARD AIR/H2O DISP

## (undated) DEVICE — GAUZE,SPONGE,4"X4",16PLY,XRAY,STRL,LF: Brand: MEDLINE

## (undated) DEVICE — SWABSTK SKINPREP PVPI LF PK/3

## (undated) DEVICE — CANN O2 ETCO2 FITS ALL CONN CO2 SMPL A/ 7IN DISP LF

## (undated) DEVICE — GAUZE,SPONGE,4"X4",16PLY,STRL,LF,10/TRAY: Brand: MEDLINE

## (undated) DEVICE — IMMOB HD UNIV CLR DISP

## (undated) DEVICE — SUT GUT PLN FAST ABS 5/0 PC1 18IN 1915G

## (undated) DEVICE — PENCL E/S ULTRAVAC TELESCP NOSE HOLSTR 10FT

## (undated) DEVICE — SENSR O2 OXIMAX FNGR A/ 18IN NONSTR

## (undated) DEVICE — KT ORCA ORCAPOD DISP STRL

## (undated) DEVICE — SUT PROLN 4/0 P3 18IN 8699G

## (undated) DEVICE — GOWN,NON-REINFORCED,SIRUS,SET IN SLV,XL: Brand: MEDLINE